# Patient Record
Sex: FEMALE | Race: WHITE | Employment: PART TIME | URBAN - METROPOLITAN AREA
[De-identification: names, ages, dates, MRNs, and addresses within clinical notes are randomized per-mention and may not be internally consistent; named-entity substitution may affect disease eponyms.]

---

## 2022-01-06 ENCOUNTER — HOSPITAL ENCOUNTER (OUTPATIENT)
Dept: GENERAL RADIOLOGY | Age: 72
Discharge: HOME | End: 2022-01-06
Payer: MEDICARE

## 2022-01-06 DIAGNOSIS — S69.91XA INJURY OF RIGHT RING FINGER, INITIAL ENCOUNTER: ICD-10-CM

## 2022-01-06 PROCEDURE — 73140 X-RAY EXAM OF FINGER(S): CPT

## 2022-02-24 ENCOUNTER — HOSPITAL ENCOUNTER (OUTPATIENT)
Dept: MAMMOGRAPHY | Age: 72
Discharge: HOME | End: 2022-02-24
Attending: FAMILY MEDICINE
Payer: MEDICARE

## 2022-02-24 DIAGNOSIS — Z78.0 POSTMENOPAUSAL STATE: ICD-10-CM

## 2022-02-24 DIAGNOSIS — Z12.31 ENCOUNTER FOR SCREENING MAMMOGRAM FOR MALIGNANT NEOPLASM OF BREAST: ICD-10-CM

## 2022-02-24 PROBLEM — M81.0 AGE-RELATED OSTEOPOROSIS WITHOUT CURRENT PATHOLOGICAL FRACTURE: Status: ACTIVE | Noted: 2022-02-24

## 2022-02-24 PROCEDURE — 77067 SCR MAMMO BI INCL CAD: CPT

## 2022-02-24 PROCEDURE — 77080 DXA BONE DENSITY AXIAL: CPT

## 2022-03-10 ENCOUNTER — HOSPITAL ENCOUNTER (EMERGENCY)
Age: 72
Discharge: HOME | End: 2022-03-10
Attending: EMERGENCY MEDICINE
Payer: MEDICARE

## 2022-03-10 ENCOUNTER — APPOINTMENT (OUTPATIENT)
Dept: CT IMAGING | Age: 72
End: 2022-03-10
Attending: EMERGENCY MEDICINE
Payer: MEDICARE

## 2022-03-10 VITALS
SYSTOLIC BLOOD PRESSURE: 166 MMHG | OXYGEN SATURATION: 99 % | HEART RATE: 63 BPM | BODY MASS INDEX: 40.18 KG/M2 | WEIGHT: 256 LBS | DIASTOLIC BLOOD PRESSURE: 73 MMHG | RESPIRATION RATE: 18 BRPM | TEMPERATURE: 97.9 F | HEIGHT: 67 IN

## 2022-03-10 DIAGNOSIS — R10.11 ABDOMINAL PAIN, RIGHT UPPER QUADRANT: Primary | ICD-10-CM

## 2022-03-10 LAB
ALBUMIN SERPL-MCNC: 3.7 G/DL (ref 3.2–4.6)
ALBUMIN/GLOB SERPL: 0.9 {RATIO} (ref 1.2–3.5)
ALP SERPL-CCNC: 98 U/L (ref 50–136)
ALT SERPL-CCNC: 30 U/L (ref 12–65)
ANION GAP SERPL CALC-SCNC: 2 MMOL/L (ref 7–16)
AST SERPL-CCNC: 26 U/L (ref 15–37)
BASOPHILS # BLD: 0 K/UL (ref 0–0.2)
BASOPHILS NFR BLD: 0 % (ref 0–2)
BILIRUB SERPL-MCNC: 0.3 MG/DL (ref 0.2–1.1)
BILIRUB UR QL: NEGATIVE
BUN SERPL-MCNC: 27 MG/DL (ref 8–23)
CALCIUM SERPL-MCNC: 9.6 MG/DL (ref 8.3–10.4)
CHLORIDE SERPL-SCNC: 108 MMOL/L (ref 98–107)
CO2 SERPL-SCNC: 31 MMOL/L (ref 21–32)
CREAT SERPL-MCNC: 0.8 MG/DL (ref 0.6–1)
DIFFERENTIAL METHOD BLD: ABNORMAL
EOSINOPHIL # BLD: 0.2 K/UL (ref 0–0.8)
EOSINOPHIL NFR BLD: 3 % (ref 0.5–7.8)
ERYTHROCYTE [DISTWIDTH] IN BLOOD BY AUTOMATED COUNT: 12.9 % (ref 11.9–14.6)
GLOBULIN SER CALC-MCNC: 3.9 G/DL (ref 2.3–3.5)
GLUCOSE SERPL-MCNC: 94 MG/DL (ref 65–100)
GLUCOSE UR QL STRIP.AUTO: NEGATIVE MG/DL
HCT VFR BLD AUTO: 39.6 % (ref 35.8–46.3)
HGB BLD-MCNC: 12.4 G/DL (ref 11.7–15.4)
IMM GRANULOCYTES # BLD AUTO: 0 K/UL (ref 0–0.5)
IMM GRANULOCYTES NFR BLD AUTO: 0 % (ref 0–5)
KETONES UR-MCNC: NEGATIVE MG/DL
LEUKOCYTE ESTERASE UR QL STRIP: NEGATIVE
LIPASE SERPL-CCNC: 143 U/L (ref 73–393)
LYMPHOCYTES # BLD: 2.2 K/UL (ref 0.5–4.6)
LYMPHOCYTES NFR BLD: 27 % (ref 13–44)
MCH RBC QN AUTO: 29.9 PG (ref 26.1–32.9)
MCHC RBC AUTO-ENTMCNC: 31.3 G/DL (ref 31.4–35)
MCV RBC AUTO: 95.4 FL (ref 79.6–97.8)
MONOCYTES # BLD: 0.6 K/UL (ref 0.1–1.3)
MONOCYTES NFR BLD: 8 % (ref 4–12)
NEUTS SEG # BLD: 5 K/UL (ref 1.7–8.2)
NEUTS SEG NFR BLD: 61 % (ref 43–78)
NITRITE UR QL: NEGATIVE
NRBC # BLD: 0 K/UL (ref 0–0.2)
PH UR: 5.5 [PH] (ref 5–9)
PLATELET # BLD AUTO: 288 K/UL (ref 150–450)
PMV BLD AUTO: 11.1 FL (ref 9.4–12.3)
POTASSIUM SERPL-SCNC: 4 MMOL/L (ref 3.5–5.1)
PROT SERPL-MCNC: 7.6 G/DL (ref 6.3–8.2)
PROT UR QL: NEGATIVE MG/DL
RBC # BLD AUTO: 4.15 M/UL (ref 4.05–5.2)
RBC # UR STRIP: NEGATIVE /UL
SERVICE CMNT-IMP: ABNORMAL
SODIUM SERPL-SCNC: 141 MMOL/L (ref 136–145)
SP GR UR: >1.03 (ref 1–1.02)
UROBILINOGEN UR QL: 0.2 EU/DL (ref 0.2–1)
WBC # BLD AUTO: 8.1 K/UL (ref 4.3–11.1)

## 2022-03-10 PROCEDURE — 99284 EMERGENCY DEPT VISIT MOD MDM: CPT

## 2022-03-10 PROCEDURE — 80053 COMPREHEN METABOLIC PANEL: CPT

## 2022-03-10 PROCEDURE — 81003 URINALYSIS AUTO W/O SCOPE: CPT

## 2022-03-10 PROCEDURE — 74176 CT ABD & PELVIS W/O CONTRAST: CPT

## 2022-03-10 PROCEDURE — 85025 COMPLETE CBC W/AUTO DIFF WBC: CPT

## 2022-03-10 PROCEDURE — 83690 ASSAY OF LIPASE: CPT

## 2022-03-10 NOTE — ED TRIAGE NOTES
Complains of right sided flank pain. States it started as a sharp intense pain on her right side. Now she states it is dull 2 out of 10 pain.  Denies hematuria/dysuria

## 2022-03-11 NOTE — ED NOTES
I have reviewed discharge instructions with the patient. The patient verbalized understanding. Patient left ED via Discharge Method: ambulatory to Home with (daughter). Opportunity for questions and clarification provided. Patient given 0 scripts.

## 2022-03-11 NOTE — ED PROVIDER NOTES
77-year-old white female developed acute onset right upper quadrant pain described as sharp and stabbing around 345 today. Pain lasted approximately 2 hours and has now resolved. She does report some residual soreness to palpation. No associated nausea, vomiting, fever, diaphoresis or urinary changes. No injury. She was driving her car when the pain started. it did not radiate into her back. The history is provided by the patient. Flank Pain   Associated symptoms include abdominal pain. Pertinent negatives include no chest pain, no fever and no headaches. Past Medical History:   Diagnosis Date    Arrhythmia 2005    SVT in past, has atrial septal defect, has hx of atrial arrythmias-last time-? 2005    Arthritis     osteo    Hypertension     stopped medicine about 6 months ago, bp under control due to weight loss    Obesity (BMI 30-39.9) 11/14/11    BMI-38.4    Thyroid disease 20 years    on medication       Past Surgical History:   Procedure Laterality Date    HX GYN  2007    GAGAN    HX HEENT  2003    blocked tear duct Lt eye then cataract to Lt eye    HX HEENT  2008, 2009    Blocked tear duct Rt eye with stint ;CE R also    HX HEENT      deviated septum    HX OTHER SURGICAL  2003    CATARACT Left eye    HX UROLOGICAL      bladder tack         Family History:   Problem Relation Age of Onset    Thyroid Disease Mother     Asthma Mother     Heart Disease Brother     Heart Attack Brother     Diabetes Brother     Heart Disease Father     Hypertension Father     Diabetes Sister         just lost one sister out of 2 to covid.       Malignant Hyperthermia Neg Hx     Pseudocholinesterase Deficiency Neg Hx     Delayed Awakening Neg Hx     Post-op Nausea/Vomiting Neg Hx     Emergence Delirium Neg Hx     Post-op Cognitive Dysfunction Neg Hx     Other Neg Hx     Alcohol abuse Neg Hx     Allergic Rhinitis Neg Hx     Allergy-severe Neg Hx     Amblyopia Neg Hx     Anemia Neg Hx     Anesth Problems Neg Hx     Angioedema Neg Hx     Anxiety Neg Hx     Arrhythmia Neg Hx     OSTEOARTHRITIS Neg Hx     Arthritis-rheumatoid Neg Hx     Ataxia Neg Hx     Atopy Neg Hx     Bipolar Disorder Neg Hx     Bleeding Prob Neg Hx     Blindness Neg Hx     Breast Cancer Neg Hx     Broken Bones Neg Hx     Cancer Neg Hx     Cataract Neg Hx     Celiac Disease Neg Hx     Childhood heart surgery Neg Hx     Childhood resp disease Neg Hx     Chorea Neg Hx     Clotting Disorder Neg Hx     Collagen Dis Neg Hx     Colon Cancer Neg Hx     Colon Polyps Neg Hx     COPD Neg Hx     Coronary Art Dis Neg Hx     Crohn's Disease Neg Hx     Cystic Fibrosis Neg Hx     Dementia Neg Hx     Depression Neg Hx     Dislocations Neg Hx     Downs Syndrome Neg Hx     Drug Abuse Neg Hx     Eclampsia Neg Hx     Eczema Neg Hx     Elevated Lipids Neg Hx     Emphysema Neg Hx     Fainting Neg Hx     Genitourinary () Neg Hx     Glaucoma Neg Hx     Headache Neg Hx     Heart defect Neg Hx     Heart Failure Neg Hx     Heart Surgery Neg Hx     Hemachromatosis Neg Hx     High Cholesterol Neg Hx     Immunodeficiency Neg Hx     Inflammatory Bowel Dz Neg Hx     Kidney Disease Neg Hx     Liver Disease Neg Hx     Lung Disease Neg Hx     Macular Degen Neg Hx     Mental Retardation Neg Hx     Migraines Neg Hx     Mult Sclerosis Neg Hx     Neuropathy Neg Hx     Neurofibromatosis Neg Hx     Osteoporosis Neg Hx     Ovarian Cancer Neg Hx     Pacemaker Neg Hx     Panic disorder Neg Hx     Parkinsonism Neg Hx     Prematurity Neg Hx      Labor Neg Hx     Psoriasis Neg Hx     Psychiatric Disorder Neg Hx     Psychotic Disorder Neg Hx     Rashes/Skin Problems Neg Hx     Retinal Detachment Neg Hx     Schizophrenia Neg Hx     Seizures Neg Hx     Severe Sprains Neg Hx     Sickle Cell Anemia Neg Hx     Sickle Cell Trait Neg Hx     SIDS Neg Hx     SLE Neg Hx     Spont Ab Neg Hx     Stomach Cancer Neg Hx     Strabismus Neg Hx     Stroke Neg Hx     Substance Abuse Neg Hx     Sudden Death Neg Hx     Suicide Neg Hx     Tuberculosis Neg Hx     Ulcerative Colitis Neg Hx     Urticaria Neg Hx     Wayne's Disease Neg Hx        Social History     Socioeconomic History    Marital status:      Spouse name: Not on file    Number of children: Not on file    Years of education: Not on file    Highest education level: Not on file   Occupational History    Not on file   Tobacco Use    Smoking status: Never Smoker    Smokeless tobacco: Never Used   Vaping Use    Vaping Use: Never used   Substance and Sexual Activity    Alcohol use: Yes     Comment: rarely    Drug use: Never     Types: Prescription    Sexual activity: Not on file   Other Topics Concern    Not on file   Social History Narrative    Not on file     Social Determinants of Health     Financial Resource Strain:     Difficulty of Paying Living Expenses: Not on file   Food Insecurity:     Worried About Running Out of Food in the Last Year: Not on file    Yesica of Food in the Last Year: Not on file   Transportation Needs:     Lack of Transportation (Medical): Not on file    Lack of Transportation (Non-Medical):  Not on file   Physical Activity:     Days of Exercise per Week: Not on file    Minutes of Exercise per Session: Not on file   Stress:     Feeling of Stress : Not on file   Social Connections:     Frequency of Communication with Friends and Family: Not on file    Frequency of Social Gatherings with Friends and Family: Not on file    Attends Latter day Services: Not on file    Active Member of Clubs or Organizations: Not on file    Attends Club or Organization Meetings: Not on file    Marital Status: Not on file   Intimate Partner Violence:     Fear of Current or Ex-Partner: Not on file    Emotionally Abused: Not on file    Physically Abused: Not on file    Sexually Abused: Not on file   Housing Stability:  Unable to Pay for Housing in the Last Year: Not on file    Number of Places Lived in the Last Year: Not on file    Unstable Housing in the Last Year: Not on file         ALLERGIES: Latex, Zovirax [acyclovir sodium], and Adhesive    Review of Systems   Constitutional: Negative for fever. HENT: Negative for congestion. Respiratory: Negative for cough and shortness of breath. Cardiovascular: Negative for chest pain. Gastrointestinal: Positive for abdominal pain. Negative for nausea and vomiting. Genitourinary: Positive for flank pain. Musculoskeletal: Negative for back pain and neck pain. Skin: Negative for rash. Neurological: Negative for headaches. All other systems reviewed and are negative. Vitals:    03/10/22 1832   BP: (!) 166/73   Pulse: 63   Resp: 18   Temp: 97.9 °F (36.6 °C)   SpO2: 99%   Weight: 116.1 kg (256 lb)   Height: 5' 7\" (1.702 m)            Physical Exam  Vitals and nursing note reviewed. Constitutional:       General: She is not in acute distress. Appearance: Normal appearance. She is not toxic-appearing. HENT:      Head: Normocephalic and atraumatic. Nose: Nose normal.      Mouth/Throat:      Mouth: Mucous membranes are moist.      Pharynx: Oropharynx is clear. Eyes:      Conjunctiva/sclera: Conjunctivae normal.      Pupils: Pupils are equal, round, and reactive to light. Cardiovascular:      Rate and Rhythm: Normal rate and regular rhythm. Pulmonary:      Effort: Pulmonary effort is normal.      Breath sounds: Normal breath sounds. Abdominal:      Palpations: Abdomen is soft. Comments: Mild tenderness palpation right upper and right lower quadrants. No rebound rigidity or guarding   Musculoskeletal:         General: Normal range of motion. Cervical back: Normal range of motion and neck supple. Skin:     General: Skin is warm and dry. Neurological:      Mental Status: She is alert and oriented to person, place, and time. Psychiatric:         Mood and Affect: Mood normal.         Behavior: Behavior normal.          MDM  Number of Diagnoses or Management Options  Diagnosis management comments: Blood work is all unremarkable and urinalysis normal.  CT abdomen pelvis shows no acute abnormality to explain her pain. Patient has remained very comfortable and has not required any intervention.   She appears safe for discharge home       Amount and/or Complexity of Data Reviewed  Clinical lab tests: ordered and reviewed  Tests in the radiology section of CPT®: ordered and reviewed  Independent visualization of images, tracings, or specimens: yes    Risk of Complications, Morbidity, and/or Mortality  Presenting problems: moderate  Diagnostic procedures: moderate  Management options: moderate           Procedures

## 2022-03-11 NOTE — ED NOTES
Pt to ED with c/o right abdominal pain. Pt states started this afternoon while picking up her granddaughter from school. Pt states doubled over in pain. Pt denies nausea or vomiting. Pt states no longer a sharp pain but a dull achy pain. Pt alert ambulatory and in NAD at this time.

## 2022-03-14 PROBLEM — F33.0 MAJOR DEPRESSIVE DISORDER, RECURRENT, MILD (HCC): Status: ACTIVE | Noted: 2022-03-14

## 2022-03-19 PROBLEM — M81.0 AGE-RELATED OSTEOPOROSIS WITHOUT CURRENT PATHOLOGICAL FRACTURE: Status: ACTIVE | Noted: 2022-02-24

## 2022-03-20 PROBLEM — F33.0 MAJOR DEPRESSIVE DISORDER, RECURRENT, MILD (HCC): Status: ACTIVE | Noted: 2022-03-14

## 2022-04-16 ENCOUNTER — HOSPITAL ENCOUNTER (OUTPATIENT)
Dept: ULTRASOUND IMAGING | Age: 72
Discharge: HOME | End: 2022-04-16
Attending: FAMILY MEDICINE
Payer: MEDICARE

## 2022-04-16 DIAGNOSIS — R10.11 RUQ ABDOMINAL PAIN: ICD-10-CM

## 2022-04-16 PROCEDURE — 76705 ECHO EXAM OF ABDOMEN: CPT

## 2022-04-18 NOTE — PROGRESS NOTES
Spoke to patient, bile duct slightly enlarged, order MRI of the abdomen with contrast to evaluate, discussed with patient

## 2022-05-04 ENCOUNTER — HOSPITAL ENCOUNTER (OUTPATIENT)
Dept: MRI IMAGING | Age: 72
Discharge: HOME | End: 2022-05-04
Attending: FAMILY MEDICINE
Payer: MEDICARE

## 2022-05-04 DIAGNOSIS — K83.9 BILE DUCT ABNORMALITY: ICD-10-CM

## 2022-05-04 PROCEDURE — 74183 MRI ABD W/O CNTR FLWD CNTR: CPT

## 2022-05-04 PROCEDURE — A9576 INJ PROHANCE MULTIPACK: HCPCS | Performed by: FAMILY MEDICINE

## 2022-05-04 PROCEDURE — 74011250636 HC RX REV CODE- 250/636: Performed by: FAMILY MEDICINE

## 2022-05-04 RX ORDER — SODIUM CHLORIDE 0.9 % (FLUSH) 0.9 %
10 SYRINGE (ML) INJECTION
Status: COMPLETED | OUTPATIENT
Start: 2022-05-04 | End: 2022-05-04

## 2022-05-04 RX ADMIN — Medication 10 ML: at 21:01

## 2022-05-04 RX ADMIN — GADOTERIDOL 23 ML: 279.3 INJECTION, SOLUTION INTRAVENOUS at 21:01

## 2022-05-27 ENCOUNTER — TELEPHONE (OUTPATIENT)
Dept: FAMILY MEDICINE CLINIC | Facility: CLINIC | Age: 72
End: 2022-05-27

## 2022-05-27 ENCOUNTER — OFFICE VISIT (OUTPATIENT)
Dept: FAMILY MEDICINE CLINIC | Facility: CLINIC | Age: 72
End: 2022-05-27
Payer: MEDICARE

## 2022-05-27 ENCOUNTER — HOSPITAL ENCOUNTER (OUTPATIENT)
Dept: GENERAL RADIOLOGY | Age: 72
Discharge: HOME | End: 2022-05-30
Payer: MEDICARE

## 2022-05-27 VITALS
WEIGHT: 249.4 LBS | HEART RATE: 61 BPM | HEIGHT: 66 IN | DIASTOLIC BLOOD PRESSURE: 98 MMHG | SYSTOLIC BLOOD PRESSURE: 160 MMHG | BODY MASS INDEX: 40.08 KG/M2 | TEMPERATURE: 97.7 F | OXYGEN SATURATION: 98 %

## 2022-05-27 DIAGNOSIS — M47.816 LUMBAR SPONDYLOSIS: ICD-10-CM

## 2022-05-27 DIAGNOSIS — M54.50 RIGHT LOW BACK PAIN, UNSPECIFIED CHRONICITY, UNSPECIFIED WHETHER SCIATICA PRESENT: Primary | ICD-10-CM

## 2022-05-27 DIAGNOSIS — M54.50 RIGHT LOW BACK PAIN, UNSPECIFIED CHRONICITY, UNSPECIFIED WHETHER SCIATICA PRESENT: ICD-10-CM

## 2022-05-27 PROBLEM — M79.605 PAIN IN BOTH LOWER EXTREMITIES: Status: ACTIVE | Noted: 2022-04-20

## 2022-05-27 PROBLEM — I89.0 LYMPHEDEMA OF BOTH LOWER EXTREMITIES: Status: ACTIVE | Noted: 2022-05-24

## 2022-05-27 PROBLEM — R60.9 LIPEDEMA: Status: ACTIVE | Noted: 2022-05-24

## 2022-05-27 PROBLEM — M48.061 SPINAL STENOSIS OF LUMBAR REGION WITHOUT NEUROGENIC CLAUDICATION: Status: ACTIVE | Noted: 2019-06-25

## 2022-05-27 PROBLEM — M79.604 PAIN IN BOTH LOWER EXTREMITIES: Status: ACTIVE | Noted: 2022-04-20

## 2022-05-27 LAB
BILIRUBIN, URINE, POC: NEGATIVE
BLOOD URINE, POC: NEGATIVE
GLUCOSE URINE, POC: NEGATIVE
KETONES, URINE, POC: NEGATIVE
LEUKOCYTE ESTERASE, URINE, POC: NORMAL
NITRITE, URINE, POC: NEGATIVE
PH, URINE, POC: 5.5 (ref 4.6–8)
PROTEIN,URINE, POC: NEGATIVE
SPECIFIC GRAVITY, URINE, POC: 1.02 (ref 1–1.03)
URINALYSIS CLARITY, POC: CLEAR
URINALYSIS COLOR, POC: YELLOW
UROBILINOGEN, POC: 0.2

## 2022-05-27 PROCEDURE — 81003 URINALYSIS AUTO W/O SCOPE: CPT | Performed by: NURSE PRACTITIONER

## 2022-05-27 PROCEDURE — 72100 X-RAY EXAM L-S SPINE 2/3 VWS: CPT

## 2022-05-27 PROCEDURE — 1123F ACP DISCUSS/DSCN MKR DOCD: CPT | Performed by: NURSE PRACTITIONER

## 2022-05-27 PROCEDURE — 1090F PRES/ABSN URINE INCON ASSESS: CPT | Performed by: NURSE PRACTITIONER

## 2022-05-27 PROCEDURE — 1036F TOBACCO NON-USER: CPT | Performed by: NURSE PRACTITIONER

## 2022-05-27 PROCEDURE — G8417 CALC BMI ABV UP PARAM F/U: HCPCS | Performed by: NURSE PRACTITIONER

## 2022-05-27 PROCEDURE — G8427 DOCREV CUR MEDS BY ELIG CLIN: HCPCS | Performed by: NURSE PRACTITIONER

## 2022-05-27 PROCEDURE — G8399 PT W/DXA RESULTS DOCUMENT: HCPCS | Performed by: NURSE PRACTITIONER

## 2022-05-27 PROCEDURE — 3017F COLORECTAL CA SCREEN DOC REV: CPT | Performed by: NURSE PRACTITIONER

## 2022-05-27 PROCEDURE — 99213 OFFICE O/P EST LOW 20 MIN: CPT | Performed by: NURSE PRACTITIONER

## 2022-05-27 RX ORDER — DIPHENHYDRAMINE HCL 25 MG
25 TABLET ORAL
COMMUNITY

## 2022-05-27 RX ORDER — M-VIT,TX,IRON,MINS/CALC/FOLIC 27MG-0.4MG
1 TABLET ORAL DAILY
COMMUNITY

## 2022-05-27 RX ORDER — BACLOFEN 10 MG/1
10 TABLET ORAL 3 TIMES DAILY
Qty: 30 TABLET | Refills: 0 | Status: SHIPPED | OUTPATIENT
Start: 2022-05-27

## 2022-05-27 ASSESSMENT — ENCOUNTER SYMPTOMS
WHEEZING: 0
SINUS PAIN: 0
SORE THROAT: 0
ABDOMINAL PAIN: 0
BACK PAIN: 0
CONSTIPATION: 0
COUGH: 0
EYE PAIN: 0
VOMITING: 0
DIARRHEA: 0
SHORTNESS OF BREATH: 0
NAUSEA: 0

## 2022-05-27 NOTE — PROGRESS NOTES
Johan Harden (:  1950) is a 73 y.o. female,Established patient, here for evaluation of the following chief complaint(s):  Back Pain (Pain/spasms across lower back X 1 week. More on right side. Stated that it is a lttle better today after taking Aleve and spending the day in bed yesterday with a heating pad.  )         ASSESSMENT/PLAN:  1. Right low back pain, unspecified chronicity, unspecified whether sciatica present  -     AMB POC URINALYSIS DIP STICK AUTO W/O MICRO  -     XR LUMBAR SPINE (2-3 VIEWS); Future  -     baclofen (LIORESAL) 10 MG tablet; Take 1 tablet by mouth 3 times daily, Disp-30 tablet, R-0Normal    Imaging due to bony point tenderness to rule out acute pathology. Antispasmodic for muscle spasms. Recommended ibuprofen/naproxen and heat. Offered PT, patient would like to consider that based on xray results. Return if symptoms worsen or fail to improve. Subjective   SUBJECTIVE/OBJECTIVE:  Back pain since 22. Denies injury, but does report she did a little extra exercise on Monday before her pain started. Pain is on the right side of her mid back. Describes pain as a constant throbbing. Is having some muscle spasms in the area. Tried heat yesterday. She did take some naproxen (3 tabs 2 x) yesterday. Also took one of her 's old pain pills last night. Has also tried arthritis creams. Review of Systems   Constitutional: Negative for appetite change, fatigue, fever and unexpected weight change. HENT: Negative for congestion, ear pain, postnasal drip, sinus pain and sore throat. Eyes: Negative for pain. Respiratory: Negative for cough, shortness of breath and wheezing. Cardiovascular: Negative for palpitations and leg swelling. Gastrointestinal: Negative for abdominal pain, constipation, diarrhea, nausea and vomiting. Genitourinary: Negative for dysuria, frequency and urgency. Musculoskeletal: Negative for back pain and joint swelling. Skin: Negative for rash and wound. Neurological: Negative for dizziness, weakness and headaches. Hematological: Does not bruise/bleed easily. Objective   Physical Exam  Vitals reviewed. Constitutional:       Appearance: Normal appearance. HENT:      Head: Normocephalic and atraumatic. Eyes:      Extraocular Movements: Extraocular movements intact. Pupils: Pupils are equal, round, and reactive to light. Cardiovascular:      Rate and Rhythm: Normal rate and regular rhythm. Heart sounds: Normal heart sounds. Pulmonary:      Effort: Pulmonary effort is normal.      Breath sounds: Normal breath sounds. Abdominal:      General: Abdomen is flat. Skin:     General: Skin is warm and dry. Neurological:      General: No focal deficit present. Mental Status: She is alert and oriented to person, place, and time. An electronic signature was used to authenticate this note.     --KELSIE Laughlin - CNP

## 2022-06-07 ENCOUNTER — HOSPITAL ENCOUNTER (OUTPATIENT)
Dept: PHYSICAL THERAPY | Age: 72
Setting detail: RECURRING SERIES
Discharge: HOME | End: 2022-06-10
Payer: MEDICARE

## 2022-06-07 PROCEDURE — 97110 THERAPEUTIC EXERCISES: CPT

## 2022-06-07 PROCEDURE — G0283 ELEC STIM OTHER THAN WOUND: HCPCS

## 2022-06-07 PROCEDURE — 97161 PT EVAL LOW COMPLEX 20 MIN: CPT

## 2022-06-07 ASSESSMENT — PAIN DESCRIPTION - LOCATION: LOCATION: BACK

## 2022-06-07 ASSESSMENT — PAIN DESCRIPTION - ORIENTATION: ORIENTATION: POSTERIOR

## 2022-06-07 ASSESSMENT — PAIN SCALES - GENERAL: PAINLEVEL_OUTOF10: 5

## 2022-06-07 NOTE — PROGRESS NOTES
Yanet Moralez  : 1950  Primary: Medicare Part A And B  Secondary: [address] @ [address] [place] [address] [address]  [address]  Phone: [phone]  Fax: [phone] Plan Frequency: up to 2 x's per week     Plan of Care/Certification Expiration Date: 22      PT Visit Info: Total # of Visits Approved: 99  Total # of Visits to Date: 1  PT Insurance Information: MED A and B / [place]   Progress Note Counter: 0      OUTPATIENT PHYSICAL THERAPY:OP NOTE TYPE: Treatment Note 2022       Episode  }Appt Desk              Treatment Diagnosis:  Other abnormalities of gait and mobility (R26.89)  Low Back Pain (M54.5)  Medical/Referring Diagnosis:  Low back pain, unspecified [M54.50]  Spondylosis without myelopathy or radiculopathy, lumbar region [Y72.343]  Referring Physician:  Vinayak White MD Orders:  PT Eval and Treat   Date of Onset:  Onset Date: 22     Allergies:   Latex and Acyclovir  Restrictions/Precautions:  Restrictions/Precautions: None  Required Braces or Orthoses?: No  No data recorded   Interventions Planned (Treatment may consist of any combination of the following):    Current Treatment Recommendations: Strengthening; ROM; Functional mobility training; Transfer training; Modalities; Therapeutic activities     Subjective Comments: The back is improving, but I want to be able to garden. Initial:}Posterior Back 5/10Post Session:  Posterior  Back 4/10  Medications Last Reviewed:  2022  Updated Objective Findings:  See evaluation note from today  Treatment   THERAPEUTIC EXERCISE: (25 minutes):    Exercises per grid below to improve mobility, strength, balance and coordination. Required minimal visual, verbal, manual and tactile cues to promote proper body alignment, promote proper body posture and promote proper body mechanics.   Progressed range, repetitions and complexity of movement as indicated. MODALITIES: (15 minutes): *  Electrical Stimulation Therapy ( hertz ) was provided with intensity adjusted throughout treatment to patient tolerance. lumbar region quadrapolar with increase to 25 MA's and MOIST HOT PACK cervical to LB /sacral region. Date:  6/7/22  Date:   Date:     Activity/Exercise Parameters Parameters Parameters   nustep with MHP  10 mins level 1     Patient education regarding knee and LB condtion  5 mins      Seated MOD neutral spine with estim. 10 mins                                    Treatment/Session Summary:    · Treatment Assessment:  improved mobility without flaring the knee  · Communication/Consultation:  regarding patient condition   · Equipment provided today:  None  · Recommendations/Intent for next treatment session: Next visit will focus on lumbar mobility and pain modulation  .     Total Treatment Billable Duration:  25 minutes   Time In: 9200  Time Out: [address]  }Post Session Pain  MedBridge Portal  MD Guidelines  Scanned Media  Benefits  MyChart    Future Appointments   Date Time Provider Seamus Christiansen   6/9/2022  4:45  [address], [facility]   6/13/2022  9:30  [address], [facility]   6/16/2022  3:15  [address], [facility]   6/28/2022  2:30 PM MD MICHAEL Lujan AMB   7/7/2022  8:45 AM MD YANNI Gonsales AMB

## 2022-06-07 NOTE — PLAN OF CARE
Hannah Ferrara  : 1950  Primary: Medicare Part A And B  Secondary: [address] @ [address] [place], [place] [address]  [address]  Phone: [phone]  Fax: [phone] Plan Frequency: up to 2 x's per week     Plan of Care/Certification Expiration Date: 22      PT Visit Info: Total # of Visits Approved: 99  Total # of Visits to Date: 1  PT Insurance Information: MED A and B / [place]   Progress Note Counter: 0      OUTPATIENT PHYSICAL THERAPY:OP NOTE TYPE: Initial Assessment 2022               Episode  Appt Desk         Treatment Diagnosis:  Other abnormalities of gait and mobility (R26.89)  Low Back Pain (M54.5)  * No diagnoses found *  Medical/Referring Diagnosis:  Low back pain, unspecified [M54.50]  Spondylosis without myelopathy or radiculopathy, lumbar region [G67.575]  Referring Physician:  Jaqueline Green*  MD Orders:  PT Eval and Treat   Return MD Appt:   Future Appointments   Date Time Provider Seamus Christiansen   2022  4:45  [address], [facility]   2022  9:30  [address], [facility]   2022  3:[address], [facility]   2022  2:30 PM Tay Farah MD PO[facility] AMB   2022  8:45 AM Ernesto Miner MD [facility] AMB     Date of Onset:  Onset Date: 22     Allergies:  Latex and Acyclovir  Restrictions/Precautions:    Restrictions/Precautions: None  Required Braces or Orthoses?: No  No data recorded   Medications Last Reviewed:  2022     SUBJECTIVE   History of Injury/Illness (Reason for Referral):  PER PT :  Lumbar Sprain and Strain. No loss of bowel or bladder. Improving centralized pain. No radiation. Unable to do yardwork. Caretaker for her . PER [facility] :  Back pain since 22. Denies injury, but does report she did a little extra exercise on Monday before her pain started.  Pain is on the right side of her mid back. Describes pain as a constant throbbing. Is having some muscle spasms in the area. Tried heat yesterday. She did take some naproxen (3 tabs 2 x) yesterday. Also took one of her 's old pain pills last night. Has also tried arthritis creams. x-ray showed multilevel lumbar arthritis   Patient Stated Goal(s):  \"Back to feel better in preparation for the knee surgery\"  Initial: Posterior Back 5/10 Post Session: Posterior Back 4/10  Past Medical History/Comorbidities:   Ms. Gwendolyn Moreno  has a past medical history of Arrhythmia, Arthritis, Hypertension, Obesity (BMI 30-39.9), and Thyroid disease. Ms. Gwendolyn Moreno  has a past surgical history that includes gyn (2007); heent; Urological Surgery; other surgical history (2003); heent (2003); and heent (2008, 2009). 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 OBJECTIVE   ROM:  TRUNK ROM limited at 50% in all planes of motion with soreness in all planes of motion along fascial tracts of the lumbar spine and LB region. Hinged segment at L5/S1 region. Left knee restriction and joint line edema left. Appearance/Posture : forward head posture and rounded shoulders with hinged lumbar spine.     Palpation:  Palpably tender in the myofascial lumbar region and fascial tracts hinged segment L5/S1 region iliolumbar region. Strength:  Hip strength limited at bilateral sides at 3+/5 hip extension, abduction, and flexion ,  CORE strength at 3+/5 or less   Neurological Screen:       Dermatomes: intact . SLR negative. Sensation: generalized soreness in the sacroiliac region and L5/S1 bilateral sides. Functional Mobility:       Gait/Ambulation: antalgic with decreased angel and mobility left LE . Transfers:  decreased transfers at slow independent mobility, slightly antalgic left with CORE weakness. Balance:  Single leg stance at  5 seconds or greater each side. Mental Status:  Intact alert, oriented, coherent and lucid   ASSESSMENT   Initial Assessment:  :  Jomar Yousif presents with debility and CORE strength weakness with lumbar sprain and strain along with left knee advanced DJD. The myofascial lumbar region is restricted with forward trunk postural degeneration is present and palpable tenderness iliolumbar region. She demonstrates decreased walking and home ADL's. Spinal stiffness, trunk and lower extremity weakness are present with soft tissue restriction. She is unable to do yardwork. Skilled PT is indicated for her functional mobility deficits. Signs and symptoms consistent with iliolumbar sprain and strain, which is improving. Problem List: (Impacting functional limitations): Body Structures, Functions, Activity Limitations Requiring Skilled Therapeutic Intervention: Decreased functional mobility ; Decreased ROM; Decreased body mechanics; Decreased tolerance to work activity;  Decreased strength; Decreased coordination     Therapy Prognosis:   Therapy Prognosis: Good     Assessment Complexity:   Low Complexity  PLAN   Effective Dates: 6/7/22 TO Plan of Care/Certification Expiration Date: 09/05/22     Frequency/Duration: Plan Frequency: up to 2 x's per week      Interventions Planned (Treatment may consist of any combination of the following):    Current Treatment Recommendations: Strengthening; ROM; Functional mobility training; Transfer training; Modalities; Therapeutic activities     Goals: (Goals have been discussed and agreed upon with patient. DISCHARGE GOALS: Time Frame: 90 days   1. Pt will be compliant and independent with HEP in order to increase lumbar mobility and LE and core strength/endurance to improve quality of life. 2. Pt will reduce score on Modified Oswestry Low Back Pain Questionnaire to 10/50 in order to demonstrate improved functional mobility and quality of life. 3. Pt will report standing for > 30 minutes with minimal to no increase in pain in order to be able to stand for prolonged periods as needed to perform standing for home activities. 4. Patient to demonstrate increased strength in bilateral LE's 1/2 muscle grade. Outcome Measure: Tool Used: Modified Oswestry Low Back Pain Questionnaire  Score:  Initial: 22/50  Most Recent: X/50 (Date: -- )   Interpretation of Score: Each section is scored on a 0-5 scale, 5 representing the greatest disability. The scores of each section are added together for a total score of 50. Medical Necessity:   Patient demonstrates good rehab potential due to higher previous functional level. Reason For Services/Other Comments:  Patient continues to require skilled intervention due to lumbar mobility restriction, pain and guarding in the lumbar and sacral region. .  Total Duration:  Time In: 1345  Time Out: 1440    Regarding Yenni Jolly's therapy, I certify that the treatment plan above will be carried out by a therapist or under their direction. Thank you for this referral,  Alice Sanz PT     Referring Physician Signature: Mike Talbert [place]* No Signature is Required for this note.         Post Session Pain  Charge Capture   POC Link  Treatment Note Link  MD Guidelines  Fe

## 2022-06-09 ENCOUNTER — CARE COORDINATION (OUTPATIENT)
Dept: CARE COORDINATION | Facility: CLINIC | Age: 72
End: 2022-06-09

## 2022-06-09 ENCOUNTER — HOSPITAL ENCOUNTER (OUTPATIENT)
Dept: PHYSICAL THERAPY | Age: 72
Setting detail: RECURRING SERIES
Discharge: HOME | End: 2022-06-12
Payer: MEDICARE

## 2022-06-09 ENCOUNTER — OFFICE VISIT (OUTPATIENT)
Dept: FAMILY MEDICINE CLINIC | Facility: CLINIC | Age: 72
End: 2022-06-09
Payer: MEDICARE

## 2022-06-09 VITALS
OXYGEN SATURATION: 98 % | SYSTOLIC BLOOD PRESSURE: 128 MMHG | BODY MASS INDEX: 39.53 KG/M2 | HEIGHT: 66 IN | TEMPERATURE: 98 F | WEIGHT: 246 LBS | RESPIRATION RATE: 12 BRPM | HEART RATE: 56 BPM | DIASTOLIC BLOOD PRESSURE: 62 MMHG

## 2022-06-09 DIAGNOSIS — M25.512 ACUTE PAIN OF LEFT SHOULDER: Primary | ICD-10-CM

## 2022-06-09 DIAGNOSIS — F41.8 SITUATIONAL ANXIETY: ICD-10-CM

## 2022-06-09 DIAGNOSIS — Z74.2 HOME HELP NEEDED: ICD-10-CM

## 2022-06-09 PROCEDURE — 97110 THERAPEUTIC EXERCISES: CPT

## 2022-06-09 PROCEDURE — G8427 DOCREV CUR MEDS BY ELIG CLIN: HCPCS | Performed by: NURSE PRACTITIONER

## 2022-06-09 PROCEDURE — G8399 PT W/DXA RESULTS DOCUMENT: HCPCS | Performed by: NURSE PRACTITIONER

## 2022-06-09 PROCEDURE — G8417 CALC BMI ABV UP PARAM F/U: HCPCS | Performed by: NURSE PRACTITIONER

## 2022-06-09 PROCEDURE — 1090F PRES/ABSN URINE INCON ASSESS: CPT | Performed by: NURSE PRACTITIONER

## 2022-06-09 PROCEDURE — 1036F TOBACCO NON-USER: CPT | Performed by: NURSE PRACTITIONER

## 2022-06-09 PROCEDURE — 99214 OFFICE O/P EST MOD 30 MIN: CPT | Performed by: NURSE PRACTITIONER

## 2022-06-09 PROCEDURE — 1123F ACP DISCUSS/DSCN MKR DOCD: CPT | Performed by: NURSE PRACTITIONER

## 2022-06-09 PROCEDURE — G0283 ELEC STIM OTHER THAN WOUND: HCPCS

## 2022-06-09 PROCEDURE — 3017F COLORECTAL CA SCREEN DOC REV: CPT | Performed by: NURSE PRACTITIONER

## 2022-06-09 RX ORDER — HYDROXYZINE HYDROCHLORIDE 25 MG/1
25 TABLET, FILM COATED ORAL 4 TIMES DAILY PRN
Qty: 120 TABLET | Refills: 0 | Status: SHIPPED | OUTPATIENT
Start: 2022-06-09 | End: 2022-07-07 | Stop reason: SDUPTHER

## 2022-06-09 ASSESSMENT — PATIENT HEALTH QUESTIONNAIRE - PHQ9
SUM OF ALL RESPONSES TO PHQ QUESTIONS 1-9: 6
9. THOUGHTS THAT YOU WOULD BE BETTER OFF DEAD, OR OF HURTING YOURSELF: 0
6. FEELING BAD ABOUT YOURSELF - OR THAT YOU ARE A FAILURE OR HAVE LET YOURSELF OR YOUR FAMILY DOWN: 0
7. TROUBLE CONCENTRATING ON THINGS, SUCH AS READING THE NEWSPAPER OR WATCHING TELEVISION: 0
8. MOVING OR SPEAKING SO SLOWLY THAT OTHER PEOPLE COULD HAVE NOTICED. OR THE OPPOSITE, BEING SO FIGETY OR RESTLESS THAT YOU HAVE BEEN MOVING AROUND A LOT MORE THAN USUAL: 0
SUM OF ALL RESPONSES TO PHQ QUESTIONS 1-9: 6
3. TROUBLE FALLING OR STAYING ASLEEP: 2
SUM OF ALL RESPONSES TO PHQ QUESTIONS 1-9: 6
2. FEELING DOWN, DEPRESSED OR HOPELESS: 2
4. FEELING TIRED OR HAVING LITTLE ENERGY: 2
SUM OF ALL RESPONSES TO PHQ QUESTIONS 1-9: 6
10. IF YOU CHECKED OFF ANY PROBLEMS, HOW DIFFICULT HAVE THESE PROBLEMS MADE IT FOR YOU TO DO YOUR WORK, TAKE CARE OF THINGS AT HOME, OR GET ALONG WITH OTHER PEOPLE: 1
5. POOR APPETITE OR OVEREATING: 0

## 2022-06-09 ASSESSMENT — ENCOUNTER SYMPTOMS
BACK PAIN: 0
DIARRHEA: 0
SHORTNESS OF BREATH: 0
COUGH: 0
WHEEZING: 0
VOMITING: 0
ABDOMINAL PAIN: 0
SINUS PAIN: 0
EYE PAIN: 0
NAUSEA: 0
CONSTIPATION: 0
SORE THROAT: 0

## 2022-06-09 ASSESSMENT — PAIN SCALES - GENERAL: PAINLEVEL_OUTOF10: 3

## 2022-06-09 NOTE — CARE COORDINATION
DAHLIA CHACON left  for pt regarding referral from PCP. Awaiting response. Will try again in 24 hours if no call back is received before then.

## 2022-06-09 NOTE — PROGRESS NOTES
[address] (:  1950) is a 73 y.o. female,Established patient, here for evaluation of the following chief complaint(s):  Arm Pain (Left arm  dull aching pain x 1 day, Pain a 4 on a scale of 1-10, radiating from shoulder down arm, Alieve, Heating Pad)         ASSESSMENT/PLAN:  1. Acute pain of left shoulder  Assessment & Plan:  New onset. Differentials: muscle strain, overuse injury  - No red flags on exam today, I do not see where any imaging is needed at this time. - Counseled patient on conservative treatments with RICE, Ibuprofen 600 - 800 mg (with food) every 6-8 hours to decrease inflammation and pain and Tylenol 1,000 mg TID to decrease pain massage and gentle stretching/exercises. Advised to also take PPI if taking NSAID daily. - The patient was given in depth verbal and written instructions regarding specific red flag symptoms for which to report to the ED in the meantime. Patient verbalizes understanding and agreement with all teaching and treatment plans.    - F/u  if no improvement or worsening symptoms prn, advised to expect gradual improvement over the next month. 2. Home help needed  Assessment & Plan:  Referral to case management for resources. Counseled patient that we cannot give medical advice regarding her 's condition, but perhaps case management could offer her some resources. Orders:  -     [facility] Coordination/Social Work - [facility] Care Management  3. Situational anxiety  Assessment & Plan:   Uncontrolled, trial of hydroxyzine for anxiety/insomnia. Advised against using her 's medications. Orders:  -     hydrOXYzine HCl (ATARAX) 25 MG tablet; Take 1 tablet by mouth 4 times daily as needed for Anxiety, Disp-120 tablet, R-0Normal      Return if symptoms worsen or fail to improve. Subjective   SUBJECTIVE/OBJECTIVE:  Left shoulder/arm pain x 1 day. Has been using Aleve and baclofen with some results. She is also has been using heating pads.

## 2022-06-09 NOTE — ASSESSMENT & PLAN NOTE
Referral to case management for resources. Counseled patient that we cannot give medical advice regarding her 's condition, but perhaps case management could offer her some resources.

## 2022-06-09 NOTE — PROGRESS NOTES
Daniel Villaseñor  : 1950  Primary: Medicare Part A And B  Secondary: [facility] [facility] @ [address] [place] [address] [address] [address]  [address]  Phone: [phone]  Fax: [phone] Plan Frequency: up to 2 x's per week     Plan of Care/Certification Expiration Date: 22      PT Visit Info: Total # of Visits Approved: 99  Total # of Visits to Date: 1  PT Insurance Information: MED A and B / [place]   Progress Note Counter: 0      OUTPATIENT PHYSICAL THERAPY:OP NOTE TYPE: Treatment Note 2022       Episode  }Appt Desk              Treatment Diagnosis:  Other abnormalities of gait and mobility (R26.89)  Low Back Pain (M54.5)  Medical/Referring Diagnosis:  Low back pain, unspecified [M54.50]  Spondylosis without myelopathy or radiculopathy, lumbar region [Y07.273]  Referring Physician:  Antonino Randall MD Orders:  PT Eval and Treat   Date of Onset:  Onset Date: 22     Allergies:   Latex and Acyclovir  Restrictions/Precautions:  Restrictions/Precautions: None  Required Braces or Orthoses?: No  No data recorded   Interventions Planned (Treatment may consist of any combination of the following):    Current Treatment Recommendations: Strengthening; ROM; Functional mobility training; Transfer training; Modalities; Therapeutic activities     Subjective Comments: The back is improving, no significant pain in the leg denoted. Initial:}    3/10Post Session:       2/10  Medications Last Reviewed:  2022  Updated Objective Findings:  See evaluation note from today  Treatment   THERAPEUTIC EXERCISE: 40 minutes):    Exercises per grid below to improve mobility, strength, balance and coordination. Required minimal visual, verbal, manual and tactile cues to promote proper body alignment, promote proper body posture and promote proper body mechanics. Progressed range, repetitions and complexity of movement as indicated.     MODALITIES: (15 minutes): *  Electrical Stimulation Therapy ( hertz ) was provided with intensity adjusted throughout treatment to patient tolerance. lumbar region quadrapolar with increase to 25 MA's and MOIST HOT PACK cervical to LB /sacral region. Skin check end of estim with MHP removed at minute 11 as she stated it was getting hot. Normal blanchable erythema was denoted. Date:  6/9/22  Date:   Date:     Activity/Exercise Parameters Parameters Parameters   nustep with MHP  10 mins level 1     Patient education regarding knee and LB condtion  5 mins      Seated MOD neutral spine with estim. 15 mins      Seated with green ball lumbar support and scapular retraction  20 x's      Supine trunk rotation  10 x's      Pelvic tilts  10 x's                  Treatment/Session Summary:    · Treatment Assessment:  less palpable pain in the lumbar spine at this time. · Communication/Consultation:  regarding patient condition   · Equipment provided today:  None  · Recommendations/Intent for next treatment session: Next visit will focus on lumbar mobility and pain modulation  .     Total Treatment Billable Duration:  25 minutes   Time In: 5445  Time Out: Jason Corrigan PT       Charge Capture  }Post Session Pain  MedBridge Portal  MD Guidelines  Scanned Media  Benefits  MyChart    Future Appointments   Date Time Provider Seamus Christiansen   6/13/2022  9:30  [address], [address] [facility]   6/16/2022  3:15  [address], PT [facility]   6/28/2022  2:30 PM Odalys Lozano MD [facility]   7/7/2022  8:45 AM Katelyn Taveras MD [facility]

## 2022-06-09 NOTE — ASSESSMENT & PLAN NOTE
New onset. Differentials: muscle strain, overuse injury  - No red flags on exam today, I do not see where any imaging is needed at this time. - Counseled patient on conservative treatments with RICE, Ibuprofen 600 - 800 mg (with food) every 6-8 hours to decrease inflammation and pain and Tylenol 1,000 mg TID to decrease pain massage and gentle stretching/exercises. Advised to also take PPI if taking NSAID daily. - The patient was given in depth verbal and written instructions regarding specific red flag symptoms for which to report to the ED in the meantime. Patient verbalizes understanding and agreement with all teaching and treatment plans.    - F/u  if no improvement or worsening symptoms prn, advised to expect gradual improvement over the next month.

## 2022-06-10 ENCOUNTER — CARE COORDINATION (OUTPATIENT)
Dept: CARE COORDINATION | Facility: CLINIC | Age: 72
End: 2022-06-10

## 2022-06-13 ENCOUNTER — CARE COORDINATION (OUTPATIENT)
Dept: CARE COORDINATION | Facility: CLINIC | Age: 72
End: 2022-06-13

## 2022-06-13 ENCOUNTER — HOSPITAL ENCOUNTER (OUTPATIENT)
Dept: PHYSICAL THERAPY | Age: 72
Setting detail: RECURRING SERIES
Discharge: HOME | End: 2022-06-16
Payer: MEDICARE

## 2022-06-13 PROCEDURE — 97016 VASOPNEUMATIC DEVICE THERAPY: CPT

## 2022-06-13 PROCEDURE — 97110 THERAPEUTIC EXERCISES: CPT

## 2022-06-13 ASSESSMENT — PAIN SCALES - GENERAL: PAINLEVEL_OUTOF10: 3

## 2022-06-13 NOTE — PROGRESS NOTES
indicated. MODALITIES: (15 minutes): *  Electrical Stimulation Therapy ( hertz ) was provided with intensity adjusted throughout treatment to patient tolerance. lumbar region quadrapolar with increase to 25 MA's and MOIST HOT PACK cervical to LB /sacral region. NONE TODAY ESTIM     VSPC left knee moderate compression and 34 degrees supine pillow under knee and quad and glute activation . Date:  6/13/22    Activity/Exercise Parameters   SCIFIT  10 mins level 1   Bridging  10 x's    Seated MOD neutral spine with estim. Seated with green ball lumbar support and scapular retraction  20 x's    Supine trunk rotation  10 x's    Pelvic tilts  10 x's    Supine with back in neutral position and left knee quad and glute activation LEFT  15 mins          Treatment/Session Summary:    · Treatment Assessment:  left knee increased swelling pre and improved post treatment . less pain in the LB region . · Communication/Consultation:  regarding patient condition   · Equipment provided today:  None  · Recommendations/Intent for next treatment session: Next visit will focus on lumbar mobility and pain modulation  .     Total Treatment Billable Duration:  45 minutes and VSPC   Time In: 0930  Time Out: [address], [address]       Charge Capture  }Post Session Pain  MedBridge Portal  MD Guidelines  Scanned Media  Benefits  MyChart    Future Appointments   Date Time Provider Seamus Christiansen   6/16/2022  3:15  [facility],[address]Nd Floor, [address] [facility] [place]   6/28/2022  2:30 PM Alejandro Flores MD [facility]   7/7/2022  8:45 AM Waqas Swift MD [facility]

## 2022-06-13 NOTE — CARE COORDINATION
Initial Contact Social Work Note - Ambulatory  6/13/2022      Date of referral: 6.9.22  Referral received from: PCP office  Reason for referral: assistance with spouse in the home    Previous SW referral: No  If yes, brief summary of outcome:     Two Identifiers Verified: Yes    Insurance Provider: Medicare    Support System: Adult Child/Children and Sibling(s)    [place] Status:     Community Providers:     ADL Assistance Needed: N/A    Housing/Living Concerns or Home Modification Needs: none     Transportation Concern: none    Medication Cost Concern: none     Medication Adherence Concern: none    Financial Concern(s): none    Income (only if applicable): not discussed in detail    Ability to Read/Write: Yes    Advance Care Plan:  Not on file; educated patient    Other:     Identified Needs:   Needs assistance in the home caring for spouse. 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 Sister in [place] and daughter are able to help at times but pt is feeling overwhelmed. Needs knee surgery and concerned about who will care for her spouse following her surgery. Pt has had caregiver waiver in the past a nd plans to apply again. Pt needy and unpleasant at times. Doesn't accept help from many people-just wants pt to assist him     Social Work Plan:   Provided number for [employer] caregiver waiver program.  Provided number for sitter services.  Next Steps: remain available over the next month for any other needs/questions    Method of Communication With Provider (if appropriate): Chart Routing      Goals Addressed                 This Visit's Progress     Supportive resources in place to maintain patient in the community (ie.  Home Health, DME equipment, refer to, medication assistant plan, etc.)        Assist pt with community resources over the next month

## 2022-06-16 ENCOUNTER — HOSPITAL ENCOUNTER (OUTPATIENT)
Dept: PHYSICAL THERAPY | Age: 72
Setting detail: RECURRING SERIES
Discharge: HOME | End: 2022-06-19
Payer: MEDICARE

## 2022-06-16 PROCEDURE — 97016 VASOPNEUMATIC DEVICE THERAPY: CPT

## 2022-06-16 PROCEDURE — 97110 THERAPEUTIC EXERCISES: CPT

## 2022-06-16 ASSESSMENT — PAIN SCALES - GENERAL: PAINLEVEL_OUTOF10: 3

## 2022-06-16 NOTE — PROGRESS NOTES
Siddhartha López  : 1950  Primary: Medicare Part A And B  Secondary: [facility],[address] @ [address] [place] [address] [place]  [address]  [address]  Phone: [phone]  Fax: [phone] Plan Frequency: up to 2 x's per week     Plan of Care/Certification Expiration Date: 22      PT Visit Info: Total # of Visits Approved: 99  Total # of Visits to Date: 1  PT Insurance Information: MED A and B / [place]   Progress Note Counter: 0      OUTPATIENT PHYSICAL THERAPY:OP NOTE TYPE: Treatment Note 2022       Episode  }Appt Desk              Treatment Diagnosis:  Other abnormalities of gait and mobility (R26.89)  Low Back Pain (M54.5)  Medical/Referring Diagnosis:  Low back pain, unspecified [M54.50]  Spondylosis without myelopathy or radiculopathy, lumbar region [K51.591]  Referring Physician:  Froylan Raygoza MD Orders:  PT Eval and Treat   Date of Onset:  Onset Date: 22     Allergies:   Latex and Acyclovir  Restrictions/Precautions:  Restrictions/Precautions: None  Required Braces or Orthoses?: No  No data recorded   Interventions Planned (Treatment may consist of any combination of the following):    Current Treatment Recommendations: Strengthening; ROM; Functional mobility training; Transfer training; Modalities; Therapeutic activities     Subjective Comments:  knee is feeling better overall   Initial:}    3 (4 knee)/10Post Session:       2 (and 2 for knee )/10  Medications Last Reviewed:  2022  Updated Objective Findings:  See evaluation note from today  Treatment   THERAPEUTIC EXERCISE: 40 minutes):    Exercises per grid below to improve mobility, strength, balance and coordination. Required minimal visual, verbal, manual and tactile cues to promote proper body alignment, promote proper body posture and promote proper body mechanics. Progressed range, repetitions and complexity of movement as indicated.     MODALITIES: (15 minutes): *  Electrical Stimulation Therapy ( hertz ) was provided with intensity adjusted throughout treatment to patient tolerance. lumbar region quadrapolar with increase to 25 MA's and MOIST HOT PACK cervical to LB /sacral region. NONE TODAY ESTIM     VSPC left knee moderate compression and 34 degrees supine pillow under knee and quad and glute activation 15 mins  . Date:  6/16/22    Activity/Exercise Parameters   SCIFIT  10 mins level 1   Bridging  10 x's    Seated MOD neutral spine with estim. Seated with green ball lumbar support and scapular retraction  20 x's    Supine trunk rotation  10 x's    Pelvic tilts  10 x's    Supine with back in neutral position and left knee quad and glute activation LEFT  15 mins          Treatment/Session Summary:    · Treatment Assessment:  left knee improved swelling pre and improved post treatment . less pain in the LB region . · Communication/Consultation:  regarding patient condition   · Equipment provided today:  None  · Recommendations/Intent for next treatment session: return to Dr. Josr Nash and will proceed from there.        Total Treatment Billable Duration:  45 minutes and VSPC   Time In: 6092  Time Out: [address], [address]       Charge Capture  }Post Session Pain  MedBridge Portal  MD Guidelines  Scanned Media  Benefits  MyChart    Future Appointments   Date Time Provider Seamus Christiansen   6/28/2022  2:30 PM Brenda Russo MD [facility]   7/7/2022  8:45 AM Chas Land MD [facility]

## 2022-06-28 ENCOUNTER — OFFICE VISIT (OUTPATIENT)
Dept: ORTHOPEDIC SURGERY | Age: 72
End: 2022-06-28
Payer: MEDICARE

## 2022-06-28 VITALS — BODY MASS INDEX: 39.7 KG/M2 | HEIGHT: 66 IN | WEIGHT: 247 LBS

## 2022-06-28 DIAGNOSIS — M17.12 PRIMARY OSTEOARTHRITIS OF LEFT KNEE: ICD-10-CM

## 2022-06-28 DIAGNOSIS — M25.562 LEFT KNEE PAIN, UNSPECIFIED CHRONICITY: Primary | ICD-10-CM

## 2022-06-28 PROCEDURE — 1036F TOBACCO NON-USER: CPT | Performed by: ORTHOPAEDIC SURGERY

## 2022-06-28 PROCEDURE — 1123F ACP DISCUSS/DSCN MKR DOCD: CPT | Performed by: ORTHOPAEDIC SURGERY

## 2022-06-28 PROCEDURE — G8417 CALC BMI ABV UP PARAM F/U: HCPCS | Performed by: ORTHOPAEDIC SURGERY

## 2022-06-28 PROCEDURE — 1090F PRES/ABSN URINE INCON ASSESS: CPT | Performed by: ORTHOPAEDIC SURGERY

## 2022-06-28 PROCEDURE — 3017F COLORECTAL CA SCREEN DOC REV: CPT | Performed by: ORTHOPAEDIC SURGERY

## 2022-06-28 PROCEDURE — G8399 PT W/DXA RESULTS DOCUMENT: HCPCS | Performed by: ORTHOPAEDIC SURGERY

## 2022-06-28 PROCEDURE — 99204 OFFICE O/P NEW MOD 45 MIN: CPT | Performed by: ORTHOPAEDIC SURGERY

## 2022-06-28 PROCEDURE — G8428 CUR MEDS NOT DOCUMENT: HCPCS | Performed by: ORTHOPAEDIC SURGERY

## 2022-06-28 NOTE — PROGRESS NOTES
Name: Sanjuana Lopez  YOB: 1950  Gender: female  MRN: [id number]    CC:   Chief Complaint   Patient presents with    New Patient    Knee Pain     lt knee         HPI: Maikol Hallman is a 73 y.o. female with a PMHx of anxiety, BMI of 40, atrial septal defect presents here for evaluation of left hip pain. She said left knee pain for a few years. Her pain is constant at this point even at rest.  She also occasionally has pain at night. She is recently undergone sclerotherapy left leg varicosities. She occasionally has sciatica type pain down the left leg. She does have a history of back pain. She denies any groin pain. She denies any instability of her knee. She is undergone viscosupplementation as well as cortisone injection therapy. She only takes over-the-counter anti-inflammatories. Allergies   Allergen Reactions    Latex Rash    Acyclovir Other (See Comments) and Rash     fever         Review of Systems:  As per HPI. Pertinent positives and negatives are addressed with the patient, particularly those related to musculoskeletal concerns. Non-orthopaedic concerns were referred back to the primary care physician. PHYSICAL EXAMINATION:   The patient appears their stated age and they are in no distress. Ht 5' 6\" (1.676 m)   Wt 247 lb (112 kg)   BMI 39.87 kg/m²       The lower extremities are as described below. There is bilateral distal edema, venous stasis changes, large varicosities noted. Some firm nodules palpable couple in the bilateral calfs. There is some erythema to the left lower leg of the lower calf from where she recently had an ablation procedure done by venous specialist.  Sensation is intact to light touch bilaterally. The gait is noted to be antalgic  Range of motion is 0 to 115, 3 to 105 degrees on the left  AP varus and valgus stressing of the bilateral knees reveal globally stable bilateral  Limb lengths are equal.  Hip flexion is excellent.   Their judgment and insight are normal.  They are oriented to situation. mood and affect appropriate. X-RAY: AP lateral skiers sunrise views of the left knee reveal tricompartmental degenerative changes with irregularity of the anterior compartment. There is collapse of the lateral compartment with subchondral sclerosis particular osteophytes. IMPRESSION: Severe osteoarthropathy of the left knee    RECOMMENDATIONS:    Reviewed x-ray findings with the patient. The patient has severe osteoarthritis of the left knee. She has failed nonoperative treatment. I have recommended left total knee arthroplasty. I discussed surgical procedure surgical risk and rehab time with the patient. She understands these and desires proceed with surgery. Surgery will be scheduled arrange. Patient provided with total knee literature, given a form for handicap parking sticker, and will be referred to physical therapy for up rehab program.  The patient will be scheduled for left total knee arthroplasty.       4--this is a chronic illness/condition with exacerbation

## 2022-06-28 NOTE — LETTER
Bridget [facility]uevas  [address] [facility] [address]        I recommended a handicap parking placard for the reason, walking 100 feet nonstop will aggravate their existing orthopedic condition, also the orthopedic condition creates a substantial limitation in routine walking. This permit is of medical necessity secondary to an impairment of mobility. Permanent tag.      MD [place] LIC # [id number]  Dr Maynor Gomez

## 2022-07-07 ENCOUNTER — OFFICE VISIT (OUTPATIENT)
Dept: FAMILY MEDICINE CLINIC | Facility: CLINIC | Age: 72
End: 2022-07-07
Payer: MEDICARE

## 2022-07-07 VITALS
BODY MASS INDEX: 40.02 KG/M2 | HEIGHT: 66 IN | WEIGHT: 249 LBS | HEART RATE: 67 BPM | OXYGEN SATURATION: 98 % | SYSTOLIC BLOOD PRESSURE: 134 MMHG | TEMPERATURE: 98.3 F | DIASTOLIC BLOOD PRESSURE: 82 MMHG

## 2022-07-07 DIAGNOSIS — R60.9 LIPEDEMA: Primary | ICD-10-CM

## 2022-07-07 DIAGNOSIS — M48.061 SPINAL STENOSIS OF LUMBAR REGION WITHOUT NEUROGENIC CLAUDICATION: ICD-10-CM

## 2022-07-07 DIAGNOSIS — F41.8 SITUATIONAL ANXIETY: ICD-10-CM

## 2022-07-07 DIAGNOSIS — R79.9 ABNORMAL FINDING OF BLOOD CHEMISTRY, UNSPECIFIED: ICD-10-CM

## 2022-07-07 DIAGNOSIS — F33.0 MAJOR DEPRESSIVE DISORDER, RECURRENT, MILD (HCC): ICD-10-CM

## 2022-07-07 DIAGNOSIS — I10 ESSENTIAL HYPERTENSION: ICD-10-CM

## 2022-07-07 LAB
BASOPHILS # BLD: 0.1 K/UL (ref 0–0.2)
BASOPHILS NFR BLD: 1 % (ref 0–2)
DIFFERENTIAL METHOD BLD: ABNORMAL
EOSINOPHIL # BLD: 0.2 K/UL (ref 0–0.8)
EOSINOPHIL NFR BLD: 4 % (ref 0.5–7.8)
ERYTHROCYTE [DISTWIDTH] IN BLOOD BY AUTOMATED COUNT: 13.5 % (ref 11.9–14.6)
EST. AVERAGE GLUCOSE BLD GHB EST-MCNC: 111 MG/DL
FERRITIN SERPL-MCNC: 69 NG/ML (ref 8–388)
HBA1C MFR BLD: 5.5 % (ref 4.8–5.6)
HCT VFR BLD AUTO: 39.7 % (ref 35.8–46.3)
HGB BLD-MCNC: 12.4 G/DL (ref 11.7–15.4)
IMM GRANULOCYTES # BLD AUTO: 0 K/UL (ref 0–0.5)
IMM GRANULOCYTES NFR BLD AUTO: 0 % (ref 0–5)
LYMPHOCYTES # BLD: 1.8 K/UL (ref 0.5–4.6)
LYMPHOCYTES NFR BLD: 32 % (ref 13–44)
MCH RBC QN AUTO: 30.4 PG (ref 26.1–32.9)
MCHC RBC AUTO-ENTMCNC: 31.2 G/DL (ref 31.4–35)
MCV RBC AUTO: 97.3 FL (ref 79.6–97.8)
MONOCYTES # BLD: 0.4 K/UL (ref 0.1–1.3)
MONOCYTES NFR BLD: 7 % (ref 4–12)
NEUTS SEG # BLD: 3.2 K/UL (ref 1.7–8.2)
NEUTS SEG NFR BLD: 56 % (ref 43–78)
NRBC # BLD: 0 K/UL (ref 0–0.2)
PLATELET # BLD AUTO: 300 K/UL (ref 150–450)
PMV BLD AUTO: 11.4 FL (ref 9.4–12.3)
RBC # BLD AUTO: 4.08 M/UL (ref 4.05–5.2)
WBC # BLD AUTO: 5.7 K/UL (ref 4.3–11.1)

## 2022-07-07 PROCEDURE — 1036F TOBACCO NON-USER: CPT | Performed by: FAMILY MEDICINE

## 2022-07-07 PROCEDURE — 1090F PRES/ABSN URINE INCON ASSESS: CPT | Performed by: FAMILY MEDICINE

## 2022-07-07 PROCEDURE — 99214 OFFICE O/P EST MOD 30 MIN: CPT | Performed by: FAMILY MEDICINE

## 2022-07-07 PROCEDURE — G8428 CUR MEDS NOT DOCUMENT: HCPCS | Performed by: FAMILY MEDICINE

## 2022-07-07 PROCEDURE — 3017F COLORECTAL CA SCREEN DOC REV: CPT | Performed by: FAMILY MEDICINE

## 2022-07-07 PROCEDURE — G8417 CALC BMI ABV UP PARAM F/U: HCPCS | Performed by: FAMILY MEDICINE

## 2022-07-07 PROCEDURE — 1123F ACP DISCUSS/DSCN MKR DOCD: CPT | Performed by: FAMILY MEDICINE

## 2022-07-07 PROCEDURE — G8399 PT W/DXA RESULTS DOCUMENT: HCPCS | Performed by: FAMILY MEDICINE

## 2022-07-07 RX ORDER — LISINOPRIL 20 MG/1
20 TABLET ORAL DAILY
Qty: 90 TABLET | Refills: 3 | Status: SHIPPED | OUTPATIENT
Start: 2022-07-07

## 2022-07-07 RX ORDER — HYDROXYZINE HYDROCHLORIDE 25 MG/1
25 TABLET, FILM COATED ORAL 4 TIMES DAILY PRN
Qty: 120 TABLET | Refills: 3 | Status: SHIPPED | OUTPATIENT
Start: 2022-07-07

## 2022-07-07 RX ORDER — OXYBUTYNIN CHLORIDE 10 MG/1
10 TABLET, EXTENDED RELEASE ORAL DAILY
Qty: 90 TABLET | Refills: 3 | Status: SHIPPED | OUTPATIENT
Start: 2022-07-07

## 2022-07-07 RX ORDER — LEVOTHYROXINE SODIUM 0.07 MG/1
75 TABLET ORAL
Qty: 90 TABLET | Refills: 3 | Status: SHIPPED | OUTPATIENT
Start: 2022-07-07

## 2022-07-07 RX ORDER — OMEPRAZOLE 20 MG/1
20 TABLET, DELAYED RELEASE ORAL DAILY
Qty: 90 TABLET | Refills: 3 | Status: SHIPPED | OUTPATIENT
Start: 2022-07-07

## 2022-07-07 ASSESSMENT — PATIENT HEALTH QUESTIONNAIRE - PHQ9
SUM OF ALL RESPONSES TO PHQ QUESTIONS 1-9: 1
SUM OF ALL RESPONSES TO PHQ9 QUESTIONS 1 & 2: 1
SUM OF ALL RESPONSES TO PHQ QUESTIONS 1-9: 1
SUM OF ALL RESPONSES TO PHQ QUESTIONS 1-9: 1
1. LITTLE INTEREST OR PLEASURE IN DOING THINGS: 0
SUM OF ALL RESPONSES TO PHQ QUESTIONS 1-9: 1
2. FEELING DOWN, DEPRESSED OR HOPELESS: 1

## 2022-07-07 ASSESSMENT — ENCOUNTER SYMPTOMS
GASTROINTESTINAL NEGATIVE: 1
EYES NEGATIVE: 1
RESPIRATORY NEGATIVE: 1
ALLERGIC/IMMUNOLOGIC NEGATIVE: 1

## 2022-07-07 NOTE — PROGRESS NOTES
Alvaro Cortez (: 1950) is a 73 y.o. female, established patient, here for evaluation of the following chief complaint(s):  Follow-up Chronic Condition, Medication Problem (zoloft made her hungary and sleepy states she might try to start this med again. ), Abdominal Pain (sometimes has pain in LLQ), and Other (feels like something is in her left ear)       ASSESSMENT/PLAN:  1. Lipedema  -     TSH; Future  -     CBC with Auto Differential; Future  -     Comprehensive Metabolic Panel; Future  -     Lipid Panel; Future  -     Hemoglobin A1C; Future  -     Ferritin; Future  -     Iron; Future  2. Situational anxiety  -     hydrOXYzine HCl (ATARAX) 25 MG tablet; Take 1 tablet by mouth 4 times daily as needed for Anxiety, Disp-120 tablet, R-3Normal  -     TSH; Future  -     CBC with Auto Differential; Future  -     Comprehensive Metabolic Panel; Future  -     Lipid Panel; Future  -     Hemoglobin A1C; Future  -     Ferritin; Future  -     Iron; Future  3. Major depressive disorder, recurrent, mild (HCC)  -     TSH; Future  -     CBC with Auto Differential; Future  -     Comprehensive Metabolic Panel; Future  -     Lipid Panel; Future  -     Hemoglobin A1C; Future  -     Ferritin; Future  -     Iron; Future  4. Spinal stenosis of lumbar region without neurogenic claudication  -     TSH; Future  -     CBC with Auto Differential; Future  -     Comprehensive Metabolic Panel; Future  -     Lipid Panel; Future  -     Hemoglobin A1C; Future  -     Ferritin; Future  -     Iron; Future  5. Essential hypertension  -     TSH; Future  -     CBC with Auto Differential; Future  -     Comprehensive Metabolic Panel; Future  -     Lipid Panel; Future  -     Hemoglobin A1C; Future  -     Ferritin; Future  -     Iron; Future  6. Abnormal finding of blood chemistry, unspecified   -     Hemoglobin A1C; Future  -     Ferritin; Future  -     Iron;  Future    Recheck labs today in setting of pale skin, fatigue, caregiver burden, patient requested ferritin and iron levels, patient states there is a hemoglobin or iron problem that runs in the family,  Refilled chronic medications, patient to follow-up in 6 months, sooner if any issues,  Restart Zoloft low dosage, if no improvement in the next 4 to 5 weeks, call back, can increase or change to different medication. Some cerumen impaction left ear, used curette to remove some wax, patient to use Debrox at home. SUBJECTIVE/OBJECTIVE:  HPI  Depression, anxiety, caregiver stress, tried Zoloft for a few weeks, then stopped it, would like to try this again, states that she does not think she gaited fair try. Appears pale today,  Still struggles from intermittent right-sided abdominal pain, common bile duct was enlarged on ultrasound, however MRI took a closer look and ruled out anything pathologic,  Distributed fat abnormally in her legs, told that she has lipedema  Review of Systems   Constitutional: Negative. HENT: Negative. Eyes: Negative. Respiratory: Negative. Cardiovascular: Negative. Gastrointestinal: Negative. Endocrine: Negative. Genitourinary: Negative. Musculoskeletal: Negative. Skin: Negative. Allergic/Immunologic: Negative. Neurological: Negative. Psychiatric/Behavioral: Negative. All other systems reviewed and are negative. Physical Exam  Vitals and nursing note reviewed. Constitutional:       General: She is not in acute distress. Appearance: Normal appearance. She is not ill-appearing. HENT:      Head: Normocephalic and atraumatic. Right Ear: External ear normal.      Left Ear: External ear normal.      Nose: Nose normal.      Mouth/Throat:      Mouth: Mucous membranes are dry. Eyes:      Extraocular Movements: Extraocular movements intact. Pupils: Pupils are equal, round, and reactive to light. Cardiovascular:      Rate and Rhythm: Normal rate. Pulses: Normal pulses.    Pulmonary:      Effort: Pulmonary effort is normal.      Breath sounds: Normal breath sounds. Abdominal:      General: There is no distension. Musculoskeletal:         General: Normal range of motion. Cervical back: Normal range of motion and neck supple. Skin:     General: Skin is warm and dry. Neurological:      General: No focal deficit present. Mental Status: She is alert and oriented to person, place, and time. Psychiatric:         Mood and Affect: Mood normal.           On this date 07/07/22  I have spent 30 minutes reviewing previous notes, test results and face to face with the patient discussing the diagnosis and importance of compliance with the treatment plan as well as documenting on the day of the visit. An electronic signature was used to authenticate this note.   -- Alcides Judd MD

## 2022-07-08 LAB
ALBUMIN SERPL-MCNC: 4 G/DL (ref 3.2–4.6)
ALBUMIN/GLOB SERPL: 1.2 {RATIO} (ref 1.2–3.5)
ALP SERPL-CCNC: 143 U/L (ref 50–136)
ALT SERPL-CCNC: 22 U/L (ref 12–65)
ANION GAP SERPL CALC-SCNC: 11 MMOL/L (ref 7–16)
AST SERPL-CCNC: 16 U/L (ref 15–37)
BILIRUB SERPL-MCNC: 0.4 MG/DL (ref 0.2–1.1)
BUN SERPL-MCNC: 20 MG/DL (ref 8–23)
CALCIUM SERPL-MCNC: 9.4 MG/DL (ref 8.3–10.4)
CHLORIDE SERPL-SCNC: 110 MMOL/L (ref 98–107)
CHOLEST SERPL-MCNC: 247 MG/DL
CO2 SERPL-SCNC: 22 MMOL/L (ref 21–32)
CREAT SERPL-MCNC: 0.8 MG/DL (ref 0.6–1)
GLOBULIN SER CALC-MCNC: 3.4 G/DL (ref 2.3–3.5)
GLUCOSE SERPL-MCNC: 85 MG/DL (ref 65–100)
HDLC SERPL-MCNC: 55 MG/DL (ref 40–60)
HDLC SERPL: 4.5 {RATIO}
IRON SERPL-MCNC: 92 UG/DL (ref 35–150)
LDLC SERPL CALC-MCNC: 164.8 MG/DL
POTASSIUM SERPL-SCNC: 4.8 MMOL/L (ref 3.5–5.1)
PROT SERPL-MCNC: 7.4 G/DL (ref 6.3–8.2)
SODIUM SERPL-SCNC: 143 MMOL/L (ref 136–145)
TRIGL SERPL-MCNC: 136 MG/DL (ref 35–150)
TSH, 3RD GENERATION: 2.5 UIU/ML (ref 0.36–3.74)
VLDLC SERPL CALC-MCNC: 27.2 MG/DL (ref 6–23)

## 2022-07-13 ENCOUNTER — CARE COORDINATION (OUTPATIENT)
Dept: CARE COORDINATION | Facility: CLINIC | Age: 72
End: 2022-07-13

## 2022-07-21 ENCOUNTER — OFFICE VISIT (OUTPATIENT)
Dept: ORTHOPEDIC SURGERY | Age: 72
End: 2022-07-21
Payer: MEDICARE

## 2022-07-21 DIAGNOSIS — M17.12 PRIMARY OSTEOARTHRITIS OF LEFT KNEE: Primary | ICD-10-CM

## 2022-07-21 DIAGNOSIS — Z78.9 DECREASED ACTIVITIES OF DAILY LIVING (ADL): ICD-10-CM

## 2022-07-21 DIAGNOSIS — Z74.09 IMPAIRED FUNCTIONAL MOBILITY, BALANCE, GAIT, AND ENDURANCE: ICD-10-CM

## 2022-07-21 DIAGNOSIS — M25.562 LEFT KNEE PAIN, UNSPECIFIED CHRONICITY: ICD-10-CM

## 2022-07-21 DIAGNOSIS — R68.89 DECREASED ACTIVITY TOLERANCE: ICD-10-CM

## 2022-07-21 DIAGNOSIS — M25.662 DECREASED RANGE OF MOTION OF LEFT KNEE: ICD-10-CM

## 2022-07-21 DIAGNOSIS — R29.898 IMPAIRED STRENGTH OF LOWER EXTREMITY: ICD-10-CM

## 2022-07-21 PROCEDURE — 97110 THERAPEUTIC EXERCISES: CPT | Performed by: PHYSICAL THERAPIST

## 2022-07-21 PROCEDURE — 97116 GAIT TRAINING THERAPY: CPT | Performed by: PHYSICAL THERAPIST

## 2022-07-21 PROCEDURE — 97162 PT EVAL MOD COMPLEX 30 MIN: CPT | Performed by: PHYSICAL THERAPIST

## 2022-07-21 NOTE — PROGRESS NOTES
[address]  [address] [facility]  Dept: [phone]      PHYSICAL THERAPY INITIAL EVALUATION     PT SESSION 1 of 8    TOTAL TIMED PROCEDURE CODES: 28 MIN. TOTAL TIME: 65 MIN. REFERRING PROVIDER: Liliya Barber, *  DIAGNOSIS:     ICD-10-CM    1. Primary osteoarthritis of left knee  M17.12       2. Left knee pain, unspecified chronicity  M25.562       3. Decreased activity tolerance  R68.89       4. Decreased activities of daily living (ADL)  Z78.9       5. Impaired functional mobility, balance, gait, and endurance  Z74.09       6. Impaired strength of lower extremity  R29.898       7. Decreased range of motion of left knee  M25.662          THERAPY PRECAUTIONS:Latex allergy and Adhesive/tape allergy  CO - MORBIDITIES AFFECTING PLAN OF CARE: Varicose veins - L leg; OA related pain of the lumbar spine. PERTINENT MEDICAL HISTORY       PAST MEDICAL AND SURGICAL HISTORY:  Past Medical History:   Diagnosis Date    Arrhythmia 2005    SVT in past, has atrial septal defect, has hx of atrial arrythmias-last time-? 2005    Arthritis     osteo    Hypertension     stopped medicine about 6 months ago, bp under control due to weight loss    Lipedema     Lipedema     right lower leg    Obesity (BMI 30-39.9) 11/14/11    BMI-38.4    Thyroid disease 20 years    on medication      Past Surgical History:   Procedure Laterality Date    GYN  2007    ALISON    HEENT      deviated septum    HEENT  2003    blocked tear duct Lt eye then cataract to Lt eye    HEENT  2008, 2009    Blocked tear duct Rt eye with stint ;CE R also    OTHER SURGICAL HISTORY  2003    CATARACT Left eye    UROLOGICAL SURGERY      bladder tack     MEDICATIONS: reviewed in chart   ALLERGIES:   Allergies   Allergen Reactions    Latex Rash    Acyclovir Other (See Comments) and Rash     fever    Seasonal         SUBJECTIVE     CHIEF COMPLAINTS: L knee pain; limited mobility.     HISTORY OF INJURY:   Remote hx of L knee arthroscopy (~2012) w residual pain which has continued to worsen. Date symptoms began: 2012  [facility]: [facility] (continuous duration > 3 months)  Primary cause of current episode: Unspecified  How did symptoms start: Insidious  Describe current symptoms: Knee swells, hurts and swells w Wbing; Kim Boot ADLs are limited. Received previous outpatient physical therapy for this problem? No    PAIN ASSESSMENT:  Pain location: L knee. Current (0-10 pain scale): 4/10  Average Pain/symptom intensity (0-10 scale)  Last 24 hours: 6/10  Last week (1-7 days): 6/10  How often do you feel symptoms? Frequently (51-75%)  Description: aching, dull, sharp, and throbbing  Aggravating factors: Wbing activities - standing, walking, lifting, carrying, stairs, helping   Alleviating factors: rest and heat     NEURO SCREEN: denies numbness, tingling, and radiating pain    SOCIAL/FUNCTIONAL HISTORY:  How would you rate your overall health? good  Pt lives  disable spouse who requires assistance  in a(n) 1 story house with ramped entrance and handicap accessible bathroom . Current DME: straight cane, rolling walker, and bedside commode  Work Status: Retired and Home-maker   Sleep: minimally disturbed  PLOF & Social Hx/Interests:  Over the course of 10 years activities have become progressively more limited by knee pain. Current level of function:  Can no longer work in yard; stages housework across the day or week due to knee complaints; continues to shop, maintain home but w pain. FUNCTIONAL OUTCOME QUESTIONNARE: Lower Extremity Functional Scale: 30/80= 38% function     DIAGNOSTIC EXAMS (per chart review): 6/28/22 - Severe osteoarthropathy of the left knee    PATIENT STATED GOALS: To walk w/o pain    OBJECTIVE EXAMINATION     Functional Outcome Questionnaire: Lower Extremity Functional Scale: 30/80= 38% function   Observation:   Posture: Knee valgus - 30° on L. Swelling/Edema:  Moderate  Skin Integrity: normal   Palpation/joint mobility: Tender along the joint lines    A/PROM Measures:    Right Left Comment   Knee Extension 0 0  Pain @ end range on L   Knee Flexion 122 93 Pain @ end range on L   Hip [facility]    Ankle [facility] WFL            Strength/MMT (0-5 Scale):   Right Left Comment   Knee Flexion 5 4 Pain w resisted testing on L.   Knee Extension 5 4- Pain w resisted testing on L. Quad set G F Pain w resisted testing on L. Hip Flexion 5 4 Pain w resisted testing on L. Hip Abduction NT 2+ Pain w resisted testing on L. Special Tests/Function:   Gait: assistive device used: std cane  antalgic    Stair management:step-to leading right ascending, step-to leading left descending  Sit to stand: W difficulty exaggerated use of UEs, weight shifted over the R LE. Treatment provided today consisted of initial evaluation followed by:  Patient Education on the condition/pathology, involved anatomy, and exercise rationale. 11274 - THERAPEUTIC EXERCISE: 20 MIN. To address pain and deficits related to ROM and mm function - force production, endurance, neuromuscular coordination for completing ADLs. Access Code: [email or web address]  URL: [email or web address]  Date: 07/22/2022  Prepared by: Raul Malik    Exercises  Supine Heel Slide - 2 x daily - 6 x weekly - 1 sets - 10 reps  Supine Hip Abduction on Slider - 2 x daily - 6 x weekly - 1 sets - 10 reps  Supine Knee Extension Strengthening - 2 x daily - 6 x weekly - 5 reps - 1 sets - 10 sec hold  Supine Quad Set - 4 x daily - 6 x weekly - 1 sets - 10 hold (sec)  Small Range Straight Leg Raise - 2 x daily - 6 x weekly - 1 sets - 10 reps    95200 - GAIT TRAINING: 15 MIN. Addressing mechanics, proper step length and weight shifting to improve household and community mobility as well as overall safety with ADLs. Instructed in/practiced transfers to/from sitting and gait pattern assisted w walker and cane.     CLINICAL DECISION MAKING/ASSESSMENT     Personal Factors/co-morbidities affecting POC (1-2 Medium/3+High): age  past/current experiences  support network  co-morbidities as previously noted   Problem List: (1-2 Low/ 3 Medium/ 4+ High) Pain  Localized swelling/edema  ROM limitations  Strength deficits  Motor control deficits  Impaired posture  Impaired transfers  Impaired gait  Impaired balance/proprioception  Decreased endurance/activity Tolerance  ADL/functional limitations/modifications  Restricted social activity  Restricted recreational participation  Decreased Knowledge of Precautions  Decreased [place] with Home Exercise Program    Clinical decision making: low complexity with therapist able to easily and confidently determine and predict expectations and future outcomes for the POC. Prognosis: good   Benefits and precautions of treatment explained to patient. Vivek Chamorro is a 73 y.o. female who presents to therapy today with evolving/changing clinical presentation (moderate complexity)  related to L knee OA. Pt would benefit from skilled physical therapy services to address the deficits noted above for return to prior level of function. PLAN OF CARE     Effective Dates: 7/21/2022 TO 8/21/2022 (30 days). Frequency/Duration: 4 - 8 sessions in the next 30 Day(s)  Interventions may include but are not limited to: Therapeutic exercise to develop ROM, strength, endurance and flexibility  Therapeutic activities using dynamic activities to improve function  Gait training to address mechanics, proper step length and weight shifting to improve household and community mobility as well as overall safety with ADLs  Home exercise program (HEP) development    The referring physician has reviewed and approved this evaluation and plan of care as noted by the electronic signature attached to note.     GOALS     Long term goals to be met by 8/19/2022 (4 weeks):   Patient will demonstrate good recall of HEP requiring minimal to no verbal cuing for proper form and technique. Pt will be able to perform HEP with min aggravation to injured knee. Increase knee AROM to 100 degrees, reducing amount of modifications needed related to knee stiffness. Pt. will report less pain upon standing after prolonged sitting by swinging leg prior to standing.      [address] [employer] [place]

## 2022-07-28 ENCOUNTER — OFFICE VISIT (OUTPATIENT)
Dept: ORTHOPEDIC SURGERY | Age: 72
End: 2022-07-28
Payer: MEDICARE

## 2022-07-28 DIAGNOSIS — R29.898 IMPAIRED STRENGTH OF LOWER EXTREMITY: ICD-10-CM

## 2022-07-28 DIAGNOSIS — R68.89 DECREASED ACTIVITY TOLERANCE: ICD-10-CM

## 2022-07-28 DIAGNOSIS — M17.12 PRIMARY OSTEOARTHRITIS OF LEFT KNEE: Primary | ICD-10-CM

## 2022-07-28 DIAGNOSIS — M25.662 DECREASED RANGE OF MOTION OF LEFT KNEE: ICD-10-CM

## 2022-07-28 DIAGNOSIS — M25.562 LEFT KNEE PAIN, UNSPECIFIED CHRONICITY: ICD-10-CM

## 2022-07-28 DIAGNOSIS — Z78.9 DECREASED ACTIVITIES OF DAILY LIVING (ADL): ICD-10-CM

## 2022-07-28 DIAGNOSIS — Z74.09 IMPAIRED FUNCTIONAL MOBILITY, BALANCE, GAIT, AND ENDURANCE: ICD-10-CM

## 2022-07-28 PROCEDURE — 97110 THERAPEUTIC EXERCISES: CPT | Performed by: PHYSICAL THERAPIST

## 2022-07-28 NOTE — PROGRESS NOTES
[address] [facility]  [address] [facility] [address]  Dept: [phone]     PHYSICAL THERAPY DAILY NOTE     POC ENDS: 8/21/22    PT SESSION: 2 of 8    TOTAL TIMED PROCEDURE CODES: 35 MIN. TOTAL TIME: 35 MIN. REFERRING PROVIDER: Bhavin Muller., *  DIAGNOSIS:    Diagnosis Orders   1. Primary osteoarthritis of left knee        2. Decreased activities of daily living (ADL)        3. Decreased range of motion of left knee        4. Left knee pain, unspecified chronicity        5. Impaired functional mobility, balance, gait, and endurance        6. Decreased activity tolerance        7. Impaired strength of lower extremity              THERAPY PRECAUTIONS:Latex allergy and Adhesive/tape allergy  CO - MORBIDITIES AFFECTING PLAN OF CARE: Varicose veins - L leg; OA related pain of the lumbar spine. PERTINENT MEDICAL HISTORY     PAST MEDICAL AND SURGICAL HISTORY:  Past Medical History:   Diagnosis Date    Arrhythmia 2005    SVT in past, has atrial septal defect, has hx of atrial arrythmias-last time-? 2005    Arthritis     osteo    Hypertension     stopped medicine about 6 months ago, bp under control due to weight loss    Lipedema     Lipedema     right lower leg    Obesity (BMI 30-39.9) 11/14/11    BMI-38.4    Thyroid disease 20 years    on medication      Past Surgical History:   Procedure Laterality Date    GYN  2007    ALISON    HEENT      deviated septum    HEENT  2003    blocked tear duct Lt eye then cataract to Lt eye    HEENT  2008, 2009    Blocked tear duct Rt eye with stint ;CE R also    OTHER SURGICAL HISTORY  2003    CATARACT Left eye    UROLOGICAL SURGERY      bladder tack     MEDICATIONS: reviewed in chart   ALLERGIES:   Allergies   Allergen Reactions    Latex Rash    Acyclovir Other (See Comments) and Rash     fever    Seasonal          HISTORY RELATED TO PRESENT COMPLAINT    CHIEF COMPLAINTS: L knee pain; limited mobility.     HISTORY OF INJURY:   Remote hx of L knee arthroscopy (~2012) w residual pain which has continued to worsen. Date symptoms began: 2012  Ninetta Beam of condition: Chronic (continuous duration > 3 months)  Primary cause of current episode: Unspecified  How did symptoms start: Insidious  Describe current symptoms: Knee swells, hurts and swells w Wbing; Don Carrasco ADLs are limited. Received previous outpatient physical therapy for this problem? No    PAIN ASSESSMENT:  Pain location: L knee. Current (0-10 pain scale): 4/10  Average Pain/symptom intensity (0-10 scale)  Last 24 hours: 6/10  Last week (1-7 days): 6/10  How often do you feel symptoms? Frequently (51-75%)  Description: aching, dull, sharp, and throbbing  Aggravating factors: Wbing activities - standing, walking, lifting, carrying, stairs, helping   Alleviating factors: rest and heat     NEURO SCREEN: denies numbness, tingling, and radiating pain    SOCIAL/FUNCTIONAL HISTORY:  How would you rate your overall health? good  Pt lives disable spouse who requires assistance in a(n) 1 story house with ramped entrance and handicap accessible bathroom. Current DME: straight cane, rolling walker, and bedside commode  Work Status: Retired and Home-maker   Sleep: minimally disturbed  PLOF & Social Hx/Interests: Over the course of 10 years activities have become progressively more limited by knee pain. Current level of function: Can no longer work in yard; stages housework across the day or week due to knee complaints; continues to shop, maintain home but w pain. FUNCTIONAL OUTCOME QUESTIONNARE: Lower Extremity Functional Scale: 30/80= 38% function     DIAGNOSTIC EXAMS (per chart review): 6/28/22 - Severe osteoarthropathy of the left knee    PATIENT STATED GOALS: To walk w/o pain    SUBJECTIVE     Pt reports: The heel slide exercise makes my knee hurt. [facility]     OBJECTIVE FINDINGS:  Functional Outcome Questionnaire: Lower Extremity Functional Scale: 30/80= 38% function   Observation:   Posture: Knee valgus - 30° on L. Swelling/Edema: Moderate  Skin Integrity: normal   Palpation/joint mobility: Painful clunk (@ ~30° flexion) w/I knee noted intermittently w knee flexion/extension. KNEE AROM (?) 7/21/22 7/28/22   PRE 0 - 93 0 - 100   POST       Special Tests/Function:   Gait: assistive device used: std cane  antalgic   Stair management:step-to leading right ascending, step-to leading left descending  Sit to stand: W difficulty exaggerated use of UEs, weight shifted over the R LE.    TREATMENT PROVIDED TODAY:  PATIENT EDUCATION: Reviewed initial POC including modification of activities and the use of heat and/or cold. Reviewed initial HEP w patient demonstrating each exercise and PT correcting technique as needed w redemonstration, verbal or tactile cueing. Reviewed w patient how to take these exercises/'movements' and incorporate them into her water exercises -  SLR, hip adduction/abduction, hip extension, squatting, walking progression - F/B, L/R.  PT answering any patient questions. 77215 - THERAPEUTIC EXERCISE: 35 MIN. To address pain and deficits related to ROM and mm function - force production, endurance, neuromuscular coordination for completing ADLs. EXERCISES:  Access Code: [email or web address]  URL: [email or web address]  Date: 07/29/2022  Prepared by: Megan Workman    Exercises  Supine Heel Slide - 2 x daily - 6 x weekly - 1 sets - 10 reps  Supine Heel Slide with Small Ball - 2 x daily - 6 x weekly - 1 sets - 10 reps  Supine Hip Abduction on Slider - 2 x daily - 6 x weekly - 1 sets - 10 reps  Supine Knee Extension Strengthening - 2 x daily - 6 x weekly - 5 reps - 1 sets - 10 sec hold  Supine Quad Set - 4 x daily - 6 x weekly - 1 sets - 10 hold (sec)  Small Range Straight Leg Raise - 2 x daily - 6 x weekly - 1 sets - 10 reps    [facility]    ASSESSMENT Fairly good skill and understanding of exercise program.  W heel slides attempted to find which method of exercise was least provocative and assisted w ball seemed to be the least painful. Patient has resumed water exercise so patient was educated in performing same rehab movements in the pool. Patient expressed understanding. PLAN     Continue 1x/wk until max skill attained. GOALS     Long term goals to be met by 8/26/2022 (4 weeks):   Patient will demonstrate good recall of HEP requiring minimal to no verbal cuing for proper form and technique. Pt will be able to perform HEP with min aggravation to injured knee. Increase knee AROM to 100 degrees, reducing amount of modifications needed related to knee stiffness. Pt. will report less pain upon standing after prolonged sitting by swinging leg prior to standing.      [address]

## 2022-08-04 ENCOUNTER — CARE COORDINATION (OUTPATIENT)
Dept: CARE COORDINATION | Facility: CLINIC | Age: 72
End: 2022-08-04

## 2022-08-04 ENCOUNTER — OFFICE VISIT (OUTPATIENT)
Dept: ORTHOPEDIC SURGERY | Age: 72
End: 2022-08-04
Payer: MEDICARE

## 2022-08-04 DIAGNOSIS — M17.12 PRIMARY OSTEOARTHRITIS OF LEFT KNEE: Primary | ICD-10-CM

## 2022-08-04 DIAGNOSIS — Z78.9 DECREASED ACTIVITIES OF DAILY LIVING (ADL): ICD-10-CM

## 2022-08-04 DIAGNOSIS — M25.562 LEFT KNEE PAIN, UNSPECIFIED CHRONICITY: ICD-10-CM

## 2022-08-04 DIAGNOSIS — R29.898 IMPAIRED STRENGTH OF LOWER EXTREMITY: ICD-10-CM

## 2022-08-04 DIAGNOSIS — R68.89 DECREASED ACTIVITY TOLERANCE: ICD-10-CM

## 2022-08-04 DIAGNOSIS — Z74.09 IMPAIRED FUNCTIONAL MOBILITY, BALANCE, GAIT, AND ENDURANCE: ICD-10-CM

## 2022-08-04 DIAGNOSIS — M25.662 DECREASED RANGE OF MOTION OF LEFT KNEE: ICD-10-CM

## 2022-08-04 PROCEDURE — 97110 THERAPEUTIC EXERCISES: CPT | Performed by: PHYSICAL THERAPIST

## 2022-08-04 NOTE — PROGRESS NOTES
[address] [address]  [address] [facility] [place] [address]  Dept: [phone]     PHYSICAL THERAPY DAILY NOTE     POC ENDS: 8/21/22    PT SESSION: 3 of 8    TOTAL TIMED PROCEDURE CODES: 39 MIN. TOTAL TIME: 55 MIN. REFERRING PROVIDER: Patricia Comer., *  DIAGNOSIS:    Diagnosis Orders   1. Primary osteoarthritis of left knee        2. Left knee pain, unspecified chronicity        3. Impaired strength of lower extremity        4. Decreased activities of daily living (ADL)        5. Impaired functional mobility, balance, gait, and endurance        6. Decreased range of motion of left knee        7. Decreased activity tolerance                THERAPY PRECAUTIONS:Latex allergy and Adhesive/tape allergy  CO - MORBIDITIES AFFECTING PLAN OF CARE: Varicose veins - L leg; OA related pain of the lumbar spine. PERTINENT MEDICAL HISTORY     PAST MEDICAL AND SURGICAL HISTORY:  Past Medical History:   Diagnosis Date    Arrhythmia 2005    SVT in past, has atrial septal defect, has hx of atrial arrythmias-last time-? 2005    Arthritis     osteo    Hypertension     stopped medicine about 6 months ago, bp under control due to weight loss    Lipedema     Lipedema     right lower leg    Obesity (BMI 30-39.9) 11/14/11    BMI-38.4    Thyroid disease 20 years    on medication      Past Surgical History:   Procedure Laterality Date    GYN  2007    ALISON    HEENT      deviated septum    HEENT  2003    blocked tear duct Lt eye then cataract to Lt eye    HEENT  2008, 2009    Blocked tear duct Rt eye with stint ;CE R also    OTHER SURGICAL HISTORY  2003    CATARACT Left eye    UROLOGICAL SURGERY      bladder tack     MEDICATIONS: reviewed in chart   ALLERGIES:   Allergies   Allergen Reactions    Latex Rash    Acyclovir Other (See Comments) and Rash     fever    Seasonal          HISTORY RELATED TO PRESENT COMPLAINT    CHIEF COMPLAINTS: L knee pain; limited mobility.     HISTORY OF INJURY:   Remote hx of L knee arthroscopy (~2012) w residual pain which has continued to worsen. Date symptoms began: 2012  Jane[place] Valladares of condition: Chronic (continuous duration > 3 months)  Primary cause of current episode: Unspecified  How did symptoms start: Insidious  Describe current symptoms: Knee swells, hurts and swells w Wbing; Trevin Molt ADLs are limited. Received previous outpatient physical therapy for this problem? No    PAIN ASSESSMENT:  Pain location: L knee. Current (0-10 pain scale): 4/10  Average Pain/symptom intensity (0-10 scale)  Last 24 hours: 6/10  Last week (1-7 days): 6/10  How often do you feel symptoms? Frequently (51-75%)  Description: aching, dull, sharp, and throbbing  Aggravating factors: Wbing activities - standing, walking, lifting, carrying, stairs, helping   Alleviating factors: rest and heat     NEURO SCREEN: denies numbness, tingling, and radiating pain    SOCIAL/FUNCTIONAL HISTORY:  How would you rate your overall health? good  Pt lives disable spouse who requires assistance in a(n) 1 story house with ramped entrance and handicap accessible bathroom. Current DME: straight cane, rolling walker, and bedside commode  Work Status: Retired and Home-maker   Sleep: minimally disturbed  PLOF & Social Hx/Interests: Over the course of 10 years activities have become progressively more limited by knee pain. Current level of function: Can no longer work in yard; stages housework across the day or week due to knee complaints; continues to shop, maintain home but w pain. FUNCTIONAL OUTCOME QUESTIONNARE: Lower Extremity Functional Scale: 30/80= 38% function     DIAGNOSTIC EXAMS (per chart review): 6/28/22 - Severe osteoarthropathy of the left knee    PATIENT STATED GOALS: To walk w/o pain    SUBJECTIVE     Pt reports:  I have done the water exercise program the past two days. Tired. Some knee soreness.     OBJECTIVE     FINDINGS:  Functional Outcome Questionnaire: Lower Extremity Functional Scale: 30/80= not tolerate sidelying hip abduction. Continue attempted graded progression of resistance exercise. PLAN     Continue 1x/wk until max skill attained. GOALS     Long term goals to be met by 8/19/2022 (4 weeks):   Patient will demonstrate good recall of HEP requiring minimal to no verbal cuing for proper form and technique. Pt will be able to perform HEP with min aggravation to injured knee. Increase knee AROM to 100 degrees, reducing amount of modifications needed related to knee stiffness. Pt. will report less pain upon standing after prolonged sitting by swinging leg prior to standing.      [address] [facility]

## 2022-08-10 NOTE — PROGRESS NOTES
[facility]  [address] [facility],[address] [address]  Dept: [phone]     PHYSICAL THERAPY DAILY NOTE     POC ENDS: 8/21/22    PT SESSION: 4 of 8    TOTAL TIMED PROCEDURE CODES: 39 MIN. TOTAL TIME: 45 MIN. REFERRING PROVIDER: Robert Fisher., *  DIAGNOSIS:    Diagnosis Orders   1. Primary osteoarthritis of left knee        2. Decreased activities of daily living (ADL)        3. Decreased activity tolerance        4. Left knee pain, unspecified chronicity        5. Impaired functional mobility, balance, gait, and endurance        6. Impaired strength of lower extremity        7. Decreased range of motion of left knee                  THERAPY PRECAUTIONS:Latex allergy and Adhesive/tape allergy  CO - MORBIDITIES AFFECTING PLAN OF CARE: Varicose veins - L leg; OA related pain of the lumbar spine. PERTINENT MEDICAL HISTORY     PAST MEDICAL AND SURGICAL HISTORY:  Past Medical History:   Diagnosis Date    Arrhythmia 2005    SVT in past, has atrial septal defect, has hx of atrial arrythmias-last time-? 2005    Arthritis     osteo    Hypertension     stopped medicine about 6 months ago, bp under control due to weight loss    Lipedema     Lipedema     right lower leg    Obesity (BMI 30-39.9) 11/14/11    BMI-38.4    Thyroid disease 20 years    on medication      Past Surgical History:   Procedure Laterality Date    GYN  2007    ALISON    HEENT      deviated septum    HEENT  2003    blocked tear duct Lt eye then cataract to Lt eye    HEENT  2008, 2009    Blocked tear duct Rt eye with stint ;CE R also    OTHER SURGICAL HISTORY  2003    CATARACT Left eye    UROLOGICAL SURGERY      bladder tack     MEDICATIONS: reviewed in chart   ALLERGIES:   Allergies   Allergen Reactions    Latex Rash    Acyclovir Other (See Comments) and Rash     fever    Seasonal          HISTORY RELATED TO PRESENT COMPLAINT    CHIEF COMPLAINTS: L knee pain; limited mobility.     HISTORY OF INJURY:   Remote hx of L knee arthroscopy (~2012) w residual pain which has continued to worsen. Date symptoms began: 2012  Janet  of condition: Chronic (continuous duration > 3 months)  Primary cause of current episode: Unspecified  How did symptoms start: Insidious  Describe current symptoms: Knee swells, hurts and swells w Wbing; Fiorella Galas ADLs are limited. Received previous outpatient physical therapy for this problem? No    PAIN ASSESSMENT:  Pain location: L knee. Current (0-10 pain scale): 4/10  Average Pain/symptom intensity (0-10 scale)  Last 24 hours: 6/10  Last week (1-7 days): 6/10  How often do you feel symptoms? Frequently (51-75%)  Description: aching, dull, sharp, and throbbing  Aggravating factors: Wbing activities - standing, walking, lifting, carrying, stairs, helping   Alleviating factors: rest and heat     NEURO SCREEN: denies numbness, tingling, and radiating pain    SOCIAL/FUNCTIONAL HISTORY:  How would you rate your overall health? good  Pt lives disable spouse who requires assistance in a(n) 1 story house with ramped entrance and handicap accessible bathroom. Current DME: straight cane, rolling walker, and bedside commode  Work Status: Retired and Home-maker   Sleep: minimally disturbed  PLOF & Social Hx/Interests: Over the course of 10 years activities have become progressively more limited by knee pain. Current level of function: Can no longer work in yard; stages housework across the day or week due to knee complaints; continues to shop, maintain home but w pain. FUNCTIONAL OUTCOME QUESTIONNARE: Lower Extremity Functional Scale: 30/80= 38% function     DIAGNOSTIC EXAMS (per chart review): 6/28/22 - Severe osteoarthropathy of the left knee    PATIENT STATED GOALS: To walk w/o pain. My ultimate goal is to walk on the beach again. SUBJECTIVE     Pt reports:  Continueing water exercise. I do feel like I have gained some flexibility. My ultimate goal is to walk on the beach again.     OBJECTIVE FINDINGS:  Functional Outcome Questionnaire: Lower Extremity Functional Scale: 30/80= 38% function   Observation:   Posture: Knee valgus - 30° on L. Swelling/Edema: Moderate  Skin Integrity: normal   Palpation/joint mobility: Painful clunk (@ ~30° flexion) w/I knee noted intermittently w knee flexion/extension. LEFT KNEE AROM (?) 7/21/22 7/28/22 8/11/22   PRE 0 - 93 0 - 100 0 - 104   POST        Special Tests/Function:   Gait: assistive device used: std cane  antalgic   Stair management:step-to leading right ascending, step-to leading left descending  Sit to stand: W difficulty exaggerated use of UEs, weight shifted over the R LE.    TREATMENT PROVIDED TODAY:  PATIENT EDUCATION: Reviewed/reinforced symptom limited exercise and modification of activities and the use of heat and/or cold. PT answering any patient questions. [address] - THERAPEUTIC EXERCISE: 45 MIN. To address pain and deficits related to ROM and mm function - force production, endurance, neuromuscular coordination for completing ADLs. EXERCISES:  L/R LAQs: 1 set(s): Gr R: Loop: 10. Tolerated poorly on the L. Avoid full extension. L/R HS curls: 1 set(s): Gr R: Loop: 10.    L Ball assisted heel slide: 2 set(s): 1 min. L TKEs: 2 set(s): 1#/90 sec. L Quad sets (QS): 1 set(s): 90 sec. L QS+SLR: 1 set(s): 10.  L Hip abduction: 1 set(s): 10. Slider assisted. Access Code: [email or web address]  URL: [email or web address]  Date: 07/29/2022  Prepared by: Giovany Lalfeur    Exercises  Supine Heel Slide - 2 x daily - 6 x weekly - 1 sets - 10 reps  Supine Heel Slide with Small Ball - 2 x daily - 6 x weekly - 1 sets - 10 reps  Supine Hip Abduction on Slider - 2 x daily - 6 x weekly - 1 sets - 10 reps  Supine Knee Extension Strengthening - 2 x daily - 6 x weekly - 5 reps - 1 sets - 10 sec hold  Supine Quad Set - 4 x daily - 6 x weekly - 1 sets - 10 hold (sec)  Small Range Straight Leg Raise - 2 x daily - 6 x weekly - 1 sets - 10 reps    MedBridge    ASSESSMENT     Patient notes improved flexibility which is confirmed w updated goniometric measurement. Improving ability to perform exercises w/o provocation of knee pain. Patient also participating in water exercise 2 - 3x/wk. PLAN     One additional session scheduled. At next session reassess:  Function by patient report and w LEFS. Pain intensity  Skill w HEP    Determine DC v extension of treatment plan. GOALS     Long term goals to be met by 8/19/2022 (4 weeks):   Patient will demonstrate good recall of HEP requiring minimal to no verbal cuing for proper form and technique. Pt will be able to perform HEP with min aggravation to injured knee. Increase knee AROM to 100 degrees, reducing amount of modifications needed related to knee stiffness. Pt. will report less pain upon standing after prolonged sitting by swinging leg prior to standing.      [address]

## 2022-08-11 ENCOUNTER — OFFICE VISIT (OUTPATIENT)
Dept: ORTHOPEDIC SURGERY | Age: 72
End: 2022-08-11
Payer: MEDICARE

## 2022-08-11 DIAGNOSIS — M25.662 DECREASED RANGE OF MOTION OF LEFT KNEE: ICD-10-CM

## 2022-08-11 DIAGNOSIS — Z78.9 DECREASED ACTIVITIES OF DAILY LIVING (ADL): ICD-10-CM

## 2022-08-11 DIAGNOSIS — Z74.09 IMPAIRED FUNCTIONAL MOBILITY, BALANCE, GAIT, AND ENDURANCE: ICD-10-CM

## 2022-08-11 DIAGNOSIS — R68.89 DECREASED ACTIVITY TOLERANCE: ICD-10-CM

## 2022-08-11 DIAGNOSIS — M17.12 PRIMARY OSTEOARTHRITIS OF LEFT KNEE: Primary | ICD-10-CM

## 2022-08-11 DIAGNOSIS — R29.898 IMPAIRED STRENGTH OF LOWER EXTREMITY: ICD-10-CM

## 2022-08-11 DIAGNOSIS — M25.562 LEFT KNEE PAIN, UNSPECIFIED CHRONICITY: ICD-10-CM

## 2022-08-11 PROCEDURE — 97110 THERAPEUTIC EXERCISES: CPT | Performed by: PHYSICAL THERAPIST

## 2022-08-17 ENCOUNTER — CARE COORDINATION (OUTPATIENT)
Dept: CARE COORDINATION | Facility: CLINIC | Age: 72
End: 2022-08-17

## 2022-08-18 ENCOUNTER — OFFICE VISIT (OUTPATIENT)
Dept: ORTHOPEDIC SURGERY | Age: 72
End: 2022-08-18
Payer: MEDICARE

## 2022-08-18 DIAGNOSIS — M25.662 DECREASED RANGE OF MOTION OF LEFT KNEE: ICD-10-CM

## 2022-08-18 DIAGNOSIS — R29.898 IMPAIRED STRENGTH OF LOWER EXTREMITY: ICD-10-CM

## 2022-08-18 DIAGNOSIS — Z74.09 IMPAIRED FUNCTIONAL MOBILITY, BALANCE, GAIT, AND ENDURANCE: ICD-10-CM

## 2022-08-18 DIAGNOSIS — M17.12 PRIMARY OSTEOARTHRITIS OF LEFT KNEE: Primary | ICD-10-CM

## 2022-08-18 DIAGNOSIS — R68.89 DECREASED ACTIVITY TOLERANCE: ICD-10-CM

## 2022-08-18 DIAGNOSIS — M25.562 LEFT KNEE PAIN, UNSPECIFIED CHRONICITY: ICD-10-CM

## 2022-08-18 DIAGNOSIS — Z78.9 DECREASED ACTIVITIES OF DAILY LIVING (ADL): ICD-10-CM

## 2022-08-18 PROCEDURE — 97110 THERAPEUTIC EXERCISES: CPT | Performed by: PHYSICAL THERAPIST

## 2022-08-18 NOTE — PROGRESS NOTES
[facility]  [address] [facility],[address] [address]  Dept: [phone]     PHYSICAL THERAPY DAILY NOTE     POC ENDS: 8/21/22    PT SESSION: 5 of 8    TOTAL TIMED PROCEDURE CODES: 40 MIN. TOTAL TIME: 40 MIN. REFERRING PROVIDER: Robert Fisher., *  DIAGNOSIS:    Diagnosis Orders   1. Primary osteoarthritis of left knee        2. Left knee pain, unspecified chronicity        3. Decreased range of motion of left knee        4. Decreased activities of daily living (ADL)        5. Impaired functional mobility, balance, gait, and endurance        6. Decreased activity tolerance        7. Impaired strength of lower extremity                  THERAPY PRECAUTIONS:Latex allergy and Adhesive/tape allergy  CO - MORBIDITIES AFFECTING PLAN OF CARE: Varicose veins - L leg; OA related pain of the lumbar spine. PERTINENT MEDICAL HISTORY     PAST MEDICAL AND SURGICAL HISTORY:  Past Medical History:   Diagnosis Date    Arrhythmia 2005    SVT in past, has atrial septal defect, has hx of atrial arrythmias-last time-? 2005    Arthritis     osteo    Hypertension     stopped medicine about 6 months ago, bp under control due to weight loss    Lipedema     Lipedema     right lower leg    Obesity (BMI 30-39.9) 11/14/11    BMI-38.4    Thyroid disease 20 years    on medication      Past Surgical History:   Procedure Laterality Date    GYN  2007    ALISON    HEENT      deviated septum    HEENT  2003    blocked tear duct Lt eye then cataract to Lt eye    HEENT  2008, 2009    Blocked tear duct Rt eye with stint ;CE R also    OTHER SURGICAL HISTORY  2003    CATARACT Left eye    UROLOGICAL SURGERY      bladder tack     MEDICATIONS: reviewed in chart   ALLERGIES:   Allergies   Allergen Reactions    Latex Rash    Acyclovir Other (See Comments) and Rash     fever    Seasonal          HISTORY RELATED TO PRESENT COMPLAINT    CHIEF COMPLAINTS: L knee pain; limited mobility.     HISTORY OF INJURY:   Remote hx of L knee arthroscopy (~2012) w residual pain which has continued to worsen. Date symptoms began: 2012  Ninetta Beam of condition: Chronic (continuous duration > 3 months)  Primary cause of current episode: Unspecified  How did symptoms start: Insidious  Describe current symptoms: Knee swells, hurts and swells w Wbing; Don Carrasco ADLs are limited. Received previous outpatient physical therapy for this problem? No    PAIN ASSESSMENT:  Pain location: L knee. Current (0-10 pain scale): 4/10  Average Pain/symptom intensity (0-10 scale)  Last 24 hours: 6/10  Last week (1-7 days): 6/10  How often do you feel symptoms? Frequently (51-75%)  Description: aching, dull, sharp, and throbbing  Aggravating factors: Wbing activities - standing, walking, lifting, carrying, stairs, helping   Alleviating factors: rest and heat     NEURO SCREEN: denies numbness, tingling, and radiating pain    SOCIAL/FUNCTIONAL HISTORY:  How would you rate your overall health? good  Pt lives disable spouse who requires assistance in a(n) 1 story house with ramped entrance and handicap accessible bathroom. Current DME: straight cane, rolling walker, and bedside commode  Work Status: Retired and Home-maker   Sleep: minimally disturbed  PLOF & Social Hx/Interests: Over the course of 10 years activities have become progressively more limited by knee pain. Current level of function: Can no longer work in yard; stages housework across the day or week due to knee complaints; continues to shop, maintain home but w pain. FUNCTIONAL OUTCOME QUESTIONNARE: Lower Extremity Functional Scale: 30/80= 38% function     DIAGNOSTIC EXAMS (per chart review): 6/28/22 - Severe osteoarthropathy of the left knee    PATIENT STATED GOALS: To walk w/o pain. My ultimate goal is to walk on the beach again. SUBJECTIVE     Pt reports:  Today has not done that well today. Have had episodic sharp pain that made the knee feel like it would give way. Ready for DC.   Will continue exercises and going to the water classes at least 2 days a week. My ultimate goal is to walk on the beach again. OBJECTIVE     FINDINGS:  Functional Outcome Questionnaire: Lower Extremity Functional Scale: At IE (7/21/22): 30/80= 38% function. 8/18/22: 34/80 = 43 function  Observation:   Posture: Knee valgus - 30° on L. Swelling/Edema: Moderate  Skin Integrity: normal   Palpation/joint mobility: Painful clunk (@ ~30° flexion) w/I knee noted intermittently w knee flexion/extension. LEFT KNEE AROM (?) 7/21/22 7/28/22 8/11/22   PRE 0 - 93 0 - 100 0 - 104   POST        Special Tests/Function:   Gait: assistive device used: std cane  antalgic   Stair management:step-to leading right ascending, step-to leading left descending  Sit to stand: W difficulty exaggerated use of UEs, weight shifted over the R LE.    TREATMENT PROVIDED TODAY:  PATIENT EDUCATION: Reviewed/reinforced symptom limited exercise and modification of activities and the use of heat and/or cold. PT answering any patient questions. [id number] - THERAPEUTIC EXERCISE: 40 MIN. To address pain and deficits related to ROM and mm function - force production, endurance, neuromuscular coordination for completing ADLs. EXERCISES:  L/R LAQs: 1 set(s): Lt Blue R: Loop: 10. Avoid full extension. L/R HS curls: 1 set(s): Lt Blue R: Loop: 10.    L Ball assisted heel slide: 2 set(s): 1 min. L TKEs: 2 set(s): 1#/90 sec. L Quad sets (QS): 1 set(s): 90 sec. L QS+SLR: 1 set(s): 10.  L Hip abduction: 1 set(s): 10. Slider assisted. Access Code: [email or web address]  URL: [email or web address]  Date: 07/29/2022  Prepared by: Nikita Easton    Exercises  Supine Heel Slide - 2 x daily - 6 x weekly - 1 sets - 10 reps  Supine Heel Slide with Small Ball - 2 x daily - 6 x weekly - 1 sets - 10 reps  Supine Hip Abduction on Slider - 2 x daily - 6 x weekly - 1 sets - 10 reps  Supine Knee Extension Strengthening - 2 x daily - 6 x weekly - 5 reps - 1 sets - 10 sec hold  Supine Quad Set - 4 x daily - 6 x weekly - 1 sets - 10 hold (sec)  Small Range Straight Leg Raise - 2 x daily - 6 x weekly - 1 sets - 10 reps    MedBridge    ASSESSMENT     POC completed. Patient demonstrates independent skill w exercise program.  She is also participating in water exercise classes which she will continue. PT goals attained:  Patient will demonstrate good recall of HEP requiring minimal to no verbal cuing for proper form and technique. Pt will be able to perform HEP with min aggravation to injured knee. Increase knee AROM to 100 degrees, reducing amount of modifications needed related to knee stiffness. Pt. will report less pain upon standing after prolonged sitting by swinging leg prior to standing. PLAN     DC to unsupervised HEP.     [address]

## 2022-09-02 ENCOUNTER — CARE COORDINATION (OUTPATIENT)
Dept: CARE COORDINATION | Facility: CLINIC | Age: 72
End: 2022-09-02

## 2022-09-02 NOTE — CARE COORDINATION
Final follow up call made. Left VM requesting call back. If no call back is received within 5 business days, case will be closed.

## 2022-09-09 ENCOUNTER — CARE COORDINATION (OUTPATIENT)
Dept: CARE COORDINATION | Facility: CLINIC | Age: 72
End: 2022-09-09

## 2022-09-09 NOTE — CARE COORDINATION
Received return call from pt. She has knee surgery coming up and has still not heard back from [employer],  She plans to reach back out to them.   DAHLIA CHACON will remain on care team.

## 2022-09-16 DIAGNOSIS — M17.12 PRIMARY OSTEOARTHRITIS OF LEFT KNEE: Primary | ICD-10-CM

## 2022-09-27 ENCOUNTER — PATIENT MESSAGE (OUTPATIENT)
Dept: FAMILY MEDICINE CLINIC | Facility: CLINIC | Age: 72
End: 2022-09-27

## 2022-09-27 NOTE — TELEPHONE ENCOUNTER
From: Raffi Landeros  To: Dr. Tosin Harmon  Sent: 9/27/2022 8:57 AM EDT  Subject: Counseling needed    I need a counselor to help me deal with caregiver stress and it needs to be someone that takes Medicare. Do you have any suggestions?

## 2022-10-18 ENCOUNTER — CARE COORDINATION (OUTPATIENT)
Dept: CARE COORDINATION | Facility: CLINIC | Age: 72
End: 2022-10-18

## 2022-11-17 DIAGNOSIS — M17.12 PRIMARY OSTEOARTHRITIS OF LEFT KNEE: Primary | ICD-10-CM

## 2022-11-28 RX ORDER — SODIUM CHLORIDE 0.9 % (FLUSH) 0.9 %
5-40 SYRINGE (ML) INJECTION PRN
OUTPATIENT
Start: 2022-11-28

## 2022-11-28 RX ORDER — SODIUM CHLORIDE 0.9 % (FLUSH) 0.9 %
5-40 SYRINGE (ML) INJECTION EVERY 12 HOURS SCHEDULED
OUTPATIENT
Start: 2022-11-28

## 2022-11-28 RX ORDER — SODIUM CHLORIDE 9 MG/ML
INJECTION, SOLUTION INTRAVENOUS PRN
OUTPATIENT
Start: 2022-11-28

## 2022-11-28 RX ORDER — ACETAMINOPHEN 500 MG
1000 TABLET ORAL ONCE
OUTPATIENT
Start: 2022-11-28 | End: 2022-11-28

## 2022-11-29 ENCOUNTER — HOSPITAL ENCOUNTER (OUTPATIENT)
Dept: REHABILITATION | Age: 72
Discharge: HOME | End: 2022-12-02
Payer: MEDICARE

## 2022-11-29 ENCOUNTER — HOSPITAL ENCOUNTER (OUTPATIENT)
Dept: SURGERY | Age: 72
Discharge: HOME | End: 2022-12-02
Payer: MEDICARE

## 2022-11-29 VITALS
HEART RATE: 63 BPM | WEIGHT: 251.25 LBS | OXYGEN SATURATION: 100 % | SYSTOLIC BLOOD PRESSURE: 178 MMHG | DIASTOLIC BLOOD PRESSURE: 80 MMHG | BODY MASS INDEX: 39.43 KG/M2 | TEMPERATURE: 97.7 F | HEIGHT: 67 IN

## 2022-11-29 DIAGNOSIS — M17.12 PRIMARY OSTEOARTHRITIS OF LEFT KNEE: Primary | ICD-10-CM

## 2022-11-29 LAB
ANION GAP SERPL CALC-SCNC: 4 MMOL/L (ref 2–11)
APTT PPP: 36.2 SEC (ref 24.5–34.2)
BACTERIA SPEC CULT: ABNORMAL
BASOPHILS # BLD: 0.1 K/UL (ref 0–0.2)
BASOPHILS NFR BLD: 1 % (ref 0–2)
BUN SERPL-MCNC: 32 MG/DL (ref 8–23)
CALCIUM SERPL-MCNC: 9.4 MG/DL (ref 8.3–10.4)
CHLORIDE SERPL-SCNC: 107 MMOL/L (ref 101–110)
CO2 SERPL-SCNC: 29 MMOL/L (ref 21–32)
CREAT SERPL-MCNC: 0.71 MG/DL (ref 0.6–1)
DIFFERENTIAL METHOD BLD: NORMAL
EKG ATRIAL RATE: 58 BPM
EKG DIAGNOSIS: NORMAL
EKG P AXIS: 47 DEGREES
EKG P-R INTERVAL: 136 MS
EKG Q-T INTERVAL: 424 MS
EKG QRS DURATION: 80 MS
EKG QTC CALCULATION (BAZETT): 416 MS
EKG R AXIS: 64 DEGREES
EKG T AXIS: 35 DEGREES
EKG VENTRICULAR RATE: 58 BPM
EOSINOPHIL # BLD: 0.2 K/UL (ref 0–0.8)
EOSINOPHIL NFR BLD: 3 % (ref 0.5–7.8)
ERYTHROCYTE [DISTWIDTH] IN BLOOD BY AUTOMATED COUNT: 13.2 % (ref 11.9–14.6)
EST. AVERAGE GLUCOSE BLD GHB EST-MCNC: 114 MG/DL
GLUCOSE SERPL-MCNC: 83 MG/DL (ref 65–100)
HBA1C MFR BLD: 5.6 % (ref 4.8–5.6)
HCT VFR BLD AUTO: 38.7 % (ref 35.8–46.3)
HGB BLD-MCNC: 12.3 G/DL (ref 11.7–15.4)
IMM GRANULOCYTES # BLD AUTO: 0 K/UL (ref 0–0.5)
IMM GRANULOCYTES NFR BLD AUTO: 0 % (ref 0–5)
INR PPP: 1
LYMPHOCYTES # BLD: 1.6 K/UL (ref 0.5–4.6)
LYMPHOCYTES NFR BLD: 26 % (ref 13–44)
MCH RBC QN AUTO: 30.1 PG (ref 26.1–32.9)
MCHC RBC AUTO-ENTMCNC: 31.8 G/DL (ref 31.4–35)
MCV RBC AUTO: 94.9 FL (ref 82–102)
MONOCYTES # BLD: 0.4 K/UL (ref 0.1–1.3)
MONOCYTES NFR BLD: 7 % (ref 4–12)
NEUTS SEG # BLD: 3.9 K/UL (ref 1.7–8.2)
NEUTS SEG NFR BLD: 64 % (ref 43–78)
NRBC # BLD: 0 K/UL (ref 0–0.2)
PLATELET # BLD AUTO: 305 K/UL (ref 150–450)
PMV BLD AUTO: 10.9 FL (ref 9.4–12.3)
POTASSIUM SERPL-SCNC: 3.6 MMOL/L (ref 3.5–5.1)
PROTHROMBIN TIME: 13.6 SEC (ref 12.6–14.3)
RBC # BLD AUTO: 4.08 M/UL (ref 4.05–5.2)
SERVICE CMNT-IMP: ABNORMAL
SODIUM SERPL-SCNC: 140 MMOL/L (ref 133–143)
WBC # BLD AUTO: 6.1 K/UL (ref 4.3–11.1)

## 2022-11-29 PROCEDURE — 85730 THROMBOPLASTIN TIME PARTIAL: CPT

## 2022-11-29 PROCEDURE — 85025 COMPLETE CBC W/AUTO DIFF WBC: CPT

## 2022-11-29 PROCEDURE — 83036 HEMOGLOBIN GLYCOSYLATED A1C: CPT

## 2022-11-29 PROCEDURE — 93005 ELECTROCARDIOGRAM TRACING: CPT | Performed by: ANESTHESIOLOGY

## 2022-11-29 PROCEDURE — 87641 MR-STAPH DNA AMP PROBE: CPT

## 2022-11-29 PROCEDURE — 94760 N-INVAS EAR/PLS OXIMETRY 1: CPT

## 2022-11-29 PROCEDURE — 97161 PT EVAL LOW COMPLEX 20 MIN: CPT

## 2022-11-29 PROCEDURE — 80048 BASIC METABOLIC PNL TOTAL CA: CPT

## 2022-11-29 PROCEDURE — 85610 PROTHROMBIN TIME: CPT

## 2022-11-29 RX ORDER — CETIRIZINE HYDROCHLORIDE 10 MG/1
10 TABLET ORAL
COMMUNITY

## 2022-11-29 ASSESSMENT — KOOS JR
GOING UP OR DOWN STAIRS: 2
TWISING OR PIVOTING ON KNEE: 2
BENDING TO THE FLOOR TO PICK UP OBJECT: 2
HOW SEVERE IS YOUR KNEE STIFFNESS AFTER FIRST WAKING IN MORNING: 2
STANDING UPRIGHT: 2
STRAIGHTENING KNEE FULLY: 2
KOOS JR TOTAL INTERVAL SCORE: 50.012
RISING FROM SITTING: 3

## 2022-11-29 ASSESSMENT — PULMONARY FUNCTION TESTS
FEV1 (LITERS): 2.5
FEV1 (%PREDICTED): 100

## 2022-11-29 ASSESSMENT — PAIN SCALES - GENERAL: PAINLEVEL_OUTOF10: 5

## 2022-11-29 NOTE — PROGRESS NOTES
Mendoza Eddy  : 1950  Primary: Medicare Part A And B  Secondary: [address]  at [facility]  [address] [place] [address]  [phone]        Physical Therapy Prehab Evaluation Summary:2022   Time In/Out   PT Charge Capture  Episode     MEDICAL/REFERRING DIAGNOSIS: Unilateral primary osteoarthritis, left knee [M17.12]  REFERRING PHYSICIAN: Jazzy Hdez., *    ICD-10: Treatment Diagnosis:   Pain in Left Knee (M25.562)  Stiffness of Left Knee, Not elsewhere classified (M25.662)  Difficulty in walking, Not elsewhere classified (R26.2)    DATE OF SURGERY: 22  Assessment:   COMMENTS:  Patient presents prior to L TKA. She lives with her spouse but he is disabled and uses a walker. She will have help from their daughter for the first week. Patient plans to stay one night. PROBLEM LIST:   (Impacting functional limitations):  Ms. Song presents with the following lower extremity(s) problems:  Transfers  Gait  Strength  Range of Motion  Balance  Home Exercise Program  Pain INTERVENTIONS PLANNED:   (Benefits and precautions of physical therapy have been discussed with the patient.)  Home Exercise Program  Educational Discussion       GOALS: (Goals have been discussed and agreed upon with patient.)  Discharge Goals: Time Frame: 1 Day  Patient will demonstrate independence with a home exercise program designed to increase strength, range of motion, balance, coordination, functional technique, and pain control to minimize functional deficits and optimize patient for total joint replacement. Subjective:   Past Medical History/Comorbidities:   Ms. Song  has a past medical history of Arrhythmia, Arthritis, Hypertension, Lipedema, Lipedema, Obesity (BMI 30-39.9), and Thyroid disease. Ms. Song  has a past surgical history that includes gyn (); heent; Urological Surgery; heent (); and heent (, ).     Allergies:  Latex, Acyclovir, Seasonal, and Adhesive tape    Current Medications:  Refer to pre-assessment nursing note    Home Set-Up/Prior Level of Function:  Lives With: Spouse  Type of Home: House  Home Layout: One level  Home Access: Stairs to enter without rails  Entrance Stairs - Number of Steps: 3  Bathroom Shower/Tub: Walk-in shower  Bathroom Toilet: Handicap height  Bathroom Equipment: Grab bars in shower, Shower chair, Grab bars around toilet  Home Equipment: Nichsharing.itte Hopes, rolling    Dominant Side:  Dominant Hand: : Right    Current Functional Status:   Ambulation:  [] Independent  [] Walk Indoors Only  [] Walk Outdoors  [x] Use Assistive Device  [] Use Wheelchair Only Dressing:  [x] [address] from Someone for:  [] Sock/Shoes  [] Pants  [] Everything   Bathing/Showering:   [x] Independent  [] Requires Assistance from Someone  [] [address]:  [] Routine house and yard work  [x] Light Housework Only  [] None     Objective:   PAIN:   Pre-Treatment Pain  Pain Assessment: 0-10  Pain Level: 5    Post Treatment: 5    Outcome Measure:   HOOS-JR:       KOOS-JR:  KOOS, . Knee Survey Score: 15    LOWER EXTREMITY GROSS EVALUATION:  RIGHT LE   Within Functional Limits   Abnormal   Comments   Strength [] []  4/5   Range of Motion [x] []        LEFT LE Within Functional Limits   Abnormal   Comments   Strength [] []  4/5   Range of Motion [] [] AROM LLE (degrees)  L Knee Flexion (0-145): 87  L Knee Extension (0): 0     UPPER EXTREMITY GROSS EVALUATION:     Within Functional Limits   Abnormal   Comments   Strength [x] []    Range of Motion [x] []      SENSATION  Sensation [x]       COGNITION/  PERCEPTION: Intact Impaired (Comments):   Orientation [x]     Vision [x]     Hearing [x]     Cognition  [x]       TRANSFERS: I Mod I S SBA CGA Min Mod Max Total  NT x2 Comments:   Sit to Stand [x] [] [] [] [] [] [] [] [] [] []    Stand to Sit [x] [] [] [] [] [] [] [] [] [] []    Stand pivot [] [] [] [] [] [] [] [] [] [] []     [] [] [] [] [] [] [] [] [] [] []    I=Independent, Mod I=Modified Independent, S=Supervision, SBA=Standby Assistance, CGA=Contact Guard Assistance,   Min=Minimal Assistance, Mod=Moderate Assistance, Max=Maximal Assistance, Total=Total Assistance, NT=Not Tested    BALANCE: Good Fair+ Fair Fair- Poor NT Comments   Sitting Static [x] [] [] [] [] []    Sitting Dynamic [x] [] [] [] [] []              Standing Static [] [x] [] [] [] []    Standing Dynamic [] [] [x] [] [] []      Postural Assessment:   Genu Valgus Right  Genu Valgus Left    GAIT: I Mod I S SBA CGA Min Mod Max Total  NT x2 Comments:   Level of Assistance [] [x] [] [] [] [] [] [] [] [] []    Weightbearing Status       Distance  200 feet    Gait Quality Antalgic, Decreased angel , Decreased step length, and Decreased stance    DME SPC     Stairs      Ramp     I=Independent, Mod I=Modified Independent, S=Supervision, SBA=Standby Assistance, CGA=Contact Guard Assistance,   Min=Minimal Assistance, Mod=Moderate Assistance, Max=Maximal Assistance, Total=Total Assistance, NT=Not Tested    TREATMENT:   EVALUATION: LOW COMPLEXITY: (Untimed Charge)    TREATMENT PLAN:   Effective Dates: 11/29/2022 TO 11/29/2022. Treatment/Session Assessment:  Patient was instructed in PT- HEP to increase strength and ROM in LEs. Answered all questions. Frequency/Duration: Patient to continue to perform home exercise program at least twice per day up until her surgery. EDUCATION: Education Given To: Patient  Education Provided: Role of Therapy, Plan of Care, Home Exercise Program  Education Method: Verbal  Education Outcome: Verbalized understanding    MEDICAL NECESSITY: Ms. Waqas Vieira is expected to optimize herlower extremity strength and ROM in preparation for joint replacement surgery. REASON FOR CONTINUED SERVICES: Achieve baseline assesment of musculoskeletal system, functional mobility and home environment. , educate in PT HEP in preparation for surgery, educate in hospital plan of care. COMPLIANCE WITH PROGRAM/EXERCISE: Will assess as treatment progresses. TOTAL TREATMENT DURATION:  Time In: 1030  Time Out: [address]  Minutes: 15    Regarding Keyana Jolly's therapy, I certify that the treatment plan above will be carried out by a therapist or under their direction.   Thank you for this referral,  Farzaneh Kim, PT

## 2022-11-29 NOTE — PROGRESS NOTES
11/29/22 1000   Treatment   Treatment Type Bedside spirometry   Oxygen Therapy/Pulse Ox   O2 Therapy Room air   Heart Rate 58   SpO2 99 %   Pulse Oximeter Device Mode Intermittent   $Pulse Oximeter $Spot check (single)   Bedside Spirometry   FEV-1/Actual (Liters) 2.5 Liters   FEV-1/Predicted (Liters) 100 Liters   Initial respiratory Assessment completed with pt. Pt was interviewed and evaluated in Joint camp prior to surgery. Patient ID:  Aroldo Kincaid  [id number]  89 y.o.  1950  Surgeon: Dr. Julieth Bradford  Date of Surgery: [email or web address]  Procedure: Total Left Knee Arthroplasty  Primary Care Physician: Morris Aguilar [phone]  Specialists:     BS:CLEAR      Pt taught proper COUGH technique  IS REVIEWED WITH PT AS WELL AS BENEFITS OF USING IS IN SEDENTARY PTS. DIAPHRAGMATIC BREATHING EXERCISE INSTRUCTIONS GIVEN    History of smoking:   DENIES                 Quit date:         Secondhand smoke:FATHER    Past procedures with Oxygen desaturation or delayed awakening:DENIES    Past Medical History:   Diagnosis Date    Arrhythmia 2005    SVT in past, has atrial septal defect, has hx of atrial arrythmias-last time-? 2005    Arthritis     osteo    Hypertension     stopped medicine about 6 months ago, bp under control due to weight loss    Lipedema     Lipedema     right lower leg    Obesity (BMI 30-39.9) 11/14/2011    BMI-39.95    Thyroid disease 20 years    on medication        Respiratory history:DENIES SOB                                                                   Respiratory meds:  DENIES    FAMILY PRESENT:             NO     PAST SLEEP STUDY:                        DENIES  HX OF YONG:                                       DENIES  YONG assessment:     DANGERS OF UNTREATED YONG EXPLAINED TO PT.                                          SLEEPS ON SIDE         PHYSICAL EXAM   Body mass index is 39.95 kg/m².    Vitals:    11/29/22 1010   BP: (!) 178/80   Pulse: 63   Temp: 97.7 °F (36.5 °C) SpO2: 100%     Neck circumference:   32   cm    Loud snoring:                                                 YES             Witnessed apnea or wakening gasping or choking:        DENIES         Awakens with headaches:                                               DENIES  Morning or daytime tiredness/ sleepiness:                               TIRED  Dry mouth or sore throat in morning:            YES                                              Burger stage:  3                                   SACS score:3  Stop Bang                                       CS HS  RESPIRATORY ASSESSMENT Q SHIFT   O2 PRN    ALBUTEROL  NEBULIZER Q6 PRN WHEEZING                                        Referrals:  DECLINED  Pt.  Phone Number:

## 2022-11-29 NOTE — PERIOP NOTE
Patient verified name and . Order for consent is found in EHR and matches case posting; patient verified. Type 3 surgery, Joint camp assessment complete. Labs per surgeon: CBC,BMP, PT/PTT, mrsa swab ; results (processing)  Labs per anesthesia protocol: no additional  EKG:done today - within [facility] protocols. Pt states hx of ASD dx via echo many years ago. Denies SOB, CP. No audible murmur heard at PAT. MRSA/MSSA swab collected; pharmacy to review and dose antibiotic as appropriate. Hospital approved surgical skin cleanser and instructions to return bottle on DOS given per hospital policy. Patient provided with handouts including Guide to Surgery, Pain Management, Hand Hygiene, Blood Transfusion Education, and [place] [facility] Brochure. Patient answered medical/surgical history questions at their best of ability. All prior to admission medications documented in [facility]. Original medication prescription bottle were visualized during patient appointment. Patient instructed to hold all vitamins 3 weeks prior to surgery and NSAIDS 5 days prior to surgery. Patient teach back successful and patient demonstrates knowledge of instruction. Detail Level: Detailed

## 2022-11-29 NOTE — PERIOP NOTE
PLEASE CONTINUE TAKING ALL PRESCRIPTION MEDICATIONS UP TO THE DAY OF SURGERY UNLESS OTHERWISE DIRECTED BELOW. DISCONTINUE all vitamins, herbals and supplements 21 days prior to surgery. DISCONTINUE Non-Steriodal Anti-Inflammatory (NSAIDS) such as Advil, Ibuprofen, Motrin, Naproxen and Aleve 5 days prior to surgery. Home Medications to take  the day of surgery      Eye drops     Hydroxyzine if needed     Levothyroxine     Home Medications   to Hold     Hold Naproxen for 5 days prior to surgery        Comments   Take 1 capful of Miralax daily starting one week prior to surgery. On the day before surgery please take Acetaminophen 1000mg in the morning and then again before bed. You may substitute for Tylenol 650 mg.    Bring Dynahex wash and Incentive Spirometer with you to hospital on the day of surgery. Please do not bring home medications with you on the day of surgery unless otherwise directed by your nurse. If you are instructed to bring home medications, please give them to your nurse as they will be administered by the nursing staff. If you have any questions, please call Autumn Mcneill [phone]. A copy of this note was provided to the patient for reference.

## 2022-11-29 NOTE — PROGRESS NOTES
Total Joint Surgery Preoperative Chart Review      Patient ID:  Jeniffer Christine  [id number]  89 y.o.  1950  Surgeon: Dr. Aruna New  Date of Surgery: 2022  Procedure: Total Left Knee Arthroplasty  Primary Care Physician: Pratik Garcia [phone]  Specialty Physician(s):      Subjective:   Jeniffer Christine is a 73 y.o. White (non-) female who presents for preoperative evaluation for Total Left Knee arthroplasty. This is a preoperative chart review note based on data collected by the nurse at the surgical Pre-Assessment visit. Past Medical History:   Diagnosis Date    Arrhythmia 2005    SVT in past, has atrial septal defect, has hx of atrial arrythmias-last time-?     Arthritis     osteo    Hypertension     stopped medicine about 6 months ago, bp under control due to weight loss    Lipedema     Lipedema     right lower leg    Obesity (BMI 30-39.9) 2011    BMI-39.95    Thyroid disease 20 years    on medication      Past Surgical History:   Procedure Laterality Date    GYN      ALISON    HEENT      deviated septum    HEENT      blocked tear duct Lt eye then cataract to Lt eye    HEENT  ,     Blocked tear duct Rt eye with stint ;CE R also    UROLOGICAL SURGERY      bladder tack     Family History   Problem Relation Age of Onset    Thyroid Disease Mother     Asthma Mother     Heart Disease Brother     Heart Attack Brother     Diabetes Brother     Heart Disease Father     Hypertension Father     Diabetes Sister         just lost one sister out of 2 to covid.       Neuropathy Neg Hx     Neurofibromatosis Neg Hx     Osteoporosis Neg Hx     Ovarian Cancer Neg Hx     Pacemaker Neg Hx     Panic Disorder Neg Hx     Parkinsonism Neg Hx     Premature Birth Neg Hx      Labor Neg Hx     Psoriasis Neg Hx     Psychiatric Disorder Neg Hx     Psychotic Disorder Neg Hx     Rashes/Skin Problems Neg Hx     Retinal Detachment Neg Hx     Schizophrenia Neg Hx     Seizures Neg Hx     Severe Sprains Neg Hx     Sickle Cell Anemia Neg Hx     Sickle Cell Trait Neg Hx     SIDS Neg Hx     Lupus Neg Hx     Spont Abortions Neg Hx     Stomach Cancer Neg Hx     Strabismus Neg Hx     Stroke Neg Hx     Substance Abuse Neg Hx     Sudden Death Neg Hx     Suicide Neg Hx     Tuberculosis Neg Hx     Ulcerative Colitis Neg Hx     Urticaria Neg Hx     Sandeep's Disease Neg Hx     Malig Hypertherm Neg Hx     Pseudochol.  Deficiency Neg Hx     Delayed Awakening Neg Hx     Post-op Nausea/Vomiting Neg Hx     Other Neg Hx     Emergence Delirium Neg Hx     Post-op Cognitive Dysfunction Neg Hx     Alcohol Abuse Neg Hx     Glaucoma Neg Hx     Headache Neg Hx     Heart Defect Neg Hx     Heart Failure Neg Hx     Heart Surgery Neg Hx     Hemochromatosis Neg Hx     High Cholesterol Neg Hx     Immunodeficiency Neg Hx     Inflam Bowel Dis Neg Hx     Allergic Rhinitis Neg Hx     Allergy (Severe) Neg Hx     Amblyopia Neg Hx     Anemia Neg Hx     Anesth Problems Neg Hx     Angioedema Neg Hx     Anxiety Disorder Neg Hx     Arrhythmia Neg Hx     Osteoarthritis Neg Hx     Rheum Arthritis Neg Hx     Ataxia Neg Hx     Atopy Neg Hx     Bipolar Disorder Neg Hx     Bleeding Prob Neg Hx     Blindness Neg Hx     Breast Cancer Neg Hx     Broken Bones Neg Hx     Cancer Neg Hx     Cataracts Neg Hx     Celiac Disease Neg Hx     Chdhd Hrt Surg Neg Hx     Chdhd Resp Dis Neg Hx     Chorea Neg Hx     Clotting Disorder Neg Hx     Collagen Disease Neg Hx     Colon Cancer Neg Hx     Colon Polyps Neg Hx     COPD Neg Hx     Coronary Art Dis Neg Hx     Crohn's Disease Neg Hx     Cystic Fibrosis Neg Hx     Dementia Neg Hx     Depression Neg Hx     Dislocations Neg Hx     Down Syndrome Neg Hx     Drug Abuse Neg Hx     Eclampsia Neg Hx     Eczema Neg Hx     Elevated Lipids Neg Hx     Emphysema Neg Hx     Fainting Neg Hx     Kidney Disease Neg Hx     Liver Disease Neg Hx     Lung Disease Neg Hx     Macular Degen Neg Hx     Mental Retardation Neg Hx Migraines Neg Hx     Mult Sclerosis Neg Hx       Social History     Tobacco Use    Smoking status: Never    Smokeless tobacco: Never   Substance Use Topics    Alcohol use: Yes     Comment: rare       Prior to Admission medications    Medication Sig Start Date End Date Taking?  Authorizing Provider   cetirizine (ZYRTEC) 10 MG tablet Take 10 mg by mouth nightly   Yes Historical Provider, MD   Glycerin-Polysorbate 80 (REFRESH DRY EYE THERAPY OP) Apply to eye as needed   Yes Historical Provider, MD   levothyroxine (SYNTHROID) 75 MCG tablet Take 1 tablet by mouth every morning (before breakfast) 7/7/22   Aurelia Malik MD   lisinopril (PRINIVIL;ZESTRIL) 20 MG tablet Take 1 tablet by mouth daily  Patient not taking: Reported on 11/29/2022 7/7/22   Aurelia Malik MD   oxybutynin (DITROPAN-XL) 10 MG extended release tablet Take 1 tablet by mouth daily  Patient taking differently: Take 10 mg by mouth at bedtime 7/7/22   Aurelia Malik MD   omeprazole (PRILOSEC OTC) 20 MG tablet Take 1 tablet by mouth daily  Patient taking differently: Take 20 mg by mouth daily as needed 7/7/22   Aurelia Malik MD   hydrOXYzine HCl (ATARAX) 25 MG tablet Take 1 tablet by mouth 4 times daily as needed for Anxiety 7/7/22   Aurelia Malik MD   diphenhydrAMINE (BENADRYL) 25 MG tablet Take 25 mg by mouth One HS for allergies    Historical Provider, MD   Multiple Vitamins-Minerals (THERAPEUTIC MULTIVITAMIN-MINERALS) tablet Take 1 tablet by mouth daily    Historical Provider, MD   baclofen (LIORESAL) 10 MG tablet Take 1 tablet by mouth 3 times daily  Patient not taking: Reported on 11/29/2022 5/27/22   Yue Primrose, APRN - CNP   Capsaicin (THERAGEN EX) Take 1 tablet by mouth  Patient not taking: No sig reported    Historical Provider, MD   L-LYSINE PO Take by mouth daily     Ar Automatic Reconciliation   Cholecalciferol 50 MCG (2000 UT) TABS Take 2,000 Units by mouth daily    Ar Automatic Reconciliation   naproxen sodium (ANAPROX) 220 MG tablet Take 220 mg by mouth 2 times daily (with meals)    Ar Automatic Reconciliation     Allergies   Allergen Reactions    Latex Rash    Acyclovir Other (See Comments) and Rash     fever    Seasonal     Adhesive Tape Rash          Objective:     Physical Exam:   Patient Vitals for the past 24 hrs:   Temp Pulse BP SpO2   11/29/22 1010 97.7 °F (36.5 °C) 63 (!) 178/80 100 %       ECG:    No results found for this or any previous visit (from the past 4464 hour(s)).     Data Review:   Labs:   Recent Results (from the past 24 hour(s))   Protime-INR    Collection Time: 11/29/22  9:59 AM   Result Value Ref Range    Protime 13.6 12.6 - 14.3 sec    INR 1.0     Hemoglobin A1c    Collection Time: 11/29/22  9:59 AM   Result Value Ref Range    Hemoglobin A1C 5.6 4.8 - 5.6 %    eAG 114 mg/dL   CBC with Auto Differential    Collection Time: 11/29/22  9:59 AM   Result Value Ref Range    WBC 6.1 4.3 - 11.1 K/uL    RBC 4.08 4.05 - 5.2 M/uL    Hemoglobin 12.3 11.7 - 15.4 g/dL    Hematocrit 38.7 35.8 - 46.3 %    MCV 94.9 82.0 - 102.0 FL    MCH 30.1 26.1 - 32.9 PG    MCHC 31.8 31.4 - 35.0 g/dL    RDW 13.2 11.9 - 14.6 %    Platelets 532 741 - 219 K/uL    MPV 10.9 9.4 - 12.3 FL    nRBC 0.00 0.0 - 0.2 K/uL    Differential Type AUTOMATED      Seg Neutrophils 64 43 - 78 %    Lymphocytes 26 13 - 44 %    Monocytes 7 4.0 - 12.0 %    Eosinophils % 3 0.5 - 7.8 %    Basophils 1 0.0 - 2.0 %    Immature Granulocytes 0 0.0 - 5.0 %    Segs Absolute 3.9 1.7 - 8.2 K/UL    Absolute Lymph # 1.6 0.5 - 4.6 K/UL    Absolute Mono # 0.4 0.1 - 1.3 K/UL    Absolute Eos # 0.2 0.0 - 0.8 K/UL    Basophils Absolute 0.1 0.0 - 0.2 K/UL    Absolute Immature Granulocyte 0.0 0.0 - 0.5 K/UL   Basic Metabolic Panel    Collection Time: 11/29/22  9:59 AM   Result Value Ref Range    Sodium 140 133 - 143 mmol/L    Potassium 3.6 3.5 - 5.1 mmol/L    Chloride 107 101 - 110 mmol/L    CO2 29 21 - 32 mmol/L    Anion Gap 4 2 - 11 mmol/L    Glucose 83 65 - 100 mg/dL BUN 32 (H) 8 - 23 MG/DL    Creatinine 0.71 0.6 - 1.0 MG/DL    Est, Glom Filt Rate >60 >60 ml/min/1.73m2    Calcium 9.4 8.3 - 10.4 MG/DL   APTT    Collection Time: 11/29/22  9:59 AM   Result Value Ref Range    PTT 36.2 (H) 24.5 - 34.2 SEC   EKG 12 Lead    Collection Time: 11/29/22 11:07 AM   Result Value Ref Range    Ventricular Rate 58 BPM    Atrial Rate 58 BPM    P-R Interval 136 ms    QRS Duration 80 ms    Q-T Interval 424 ms    QTc Calculation (Bazett) 416 ms    P Axis 47 degrees    R Axis 64 degrees    T Axis 35 degrees    Diagnosis Sinus bradycardia          Problem List:  )  Patient Active Problem List   Diagnosis    Age-related osteoporosis without current pathological fracture    Major depressive disorder, recurrent, mild (HCC)    Lipedema    Lymphedema of both lower extremities    Pain in both lower extremities    Spinal stenosis of lumbar region without neurogenic claudication    Situational anxiety    Acute pain of left shoulder    Home help needed    Degenerative arthritis of left knee       Total Joint Surgery Pre-Assessment Recommendations:           Suspected YONG with BMI 40. Recommend oxygen saturation monitoring during hospitalization and oxygen at 3 liter via [facility].       Signed By: KELSIE Cook - CNP-C    November 29, 2022

## 2022-11-29 NOTE — PERIOP NOTE
Labs done today within anesthesia protocols except PTT - will have [facility] review.       Latest Reference Range & Units 11/29/22 09:59   Sodium 133 - 143 mmol/L 140   Potassium 3.5 - 5.1 mmol/L 3.6   Chloride 101 - 110 mmol/L 107   CO2 21 - 32 mmol/L 29   BUN,BUNPL 8 - 23 MG/DL 32 (H)   Creatinine 0.6 - 1.0 MG/DL 0.71   Anion Gap 2 - 11 mmol/L 4   Est, Glom Filt Rate >60 ml/min/1.73m2 >60   Glucose, Random 65 - 100 mg/dL 83   CALCIUM, SERUM, 809789 8.3 - 10.4 MG/DL 9.4   Hemoglobin A1C 4.8 - 5.6 % 5.6   eAG (mg/dL) mg/dL 114   WBC 4.3 - 11.1 K/uL 6.1   RBC 4.05 - 5.2 M/uL 4.08   Hemoglobin Quant 11.7 - 15.4 g/dL 12.3   Hematocrit 35.8 - 46.3 % 38.7   MCV 82.0 - 102.0 FL 94.9   MCH 26.1 - 32.9 PG 30.1   MCHC 31.4 - 35.0 g/dL 31.8   MPV 9.4 - 12.3 FL 10.9   RDW 11.9 - 14.6 % 13.2   Platelet Count 925 - 450 K/uL 305   Absolute Mono # 0.1 - 1.3 K/UL 0.4   Eosinophils % 0.5 - 7.8 % 3   Basophils Absolute 0.0 - 0.2 K/UL 0.1   Differential Type -   AUTOMATED   Seg Neutrophils 43 - 78 % 64   Segs Absolute 1.7 - 8.2 K/UL 3.9   Lymphocytes 13 - 44 % 26   Absolute Lymph # 0.5 - 4.6 K/UL 1.6   Monocytes 4.0 - 12.0 % 7   Absolute Eos # 0.0 - 0.8 K/UL 0.2   Basophils 0.0 - 2.0 % 1   Immature Granulocytes 0.0 - 5.0 % 0   Nucleated Red Blood Cells 0.0 - 0.2 K/uL 0.00   Absolute Immature Granulocyte 0.0 - 0.5 K/UL 0.0   Prothrombin Time 12.6 - 14.3 sec 13.6   INR -   1.0   PTT 24.5 - 34.2 SEC 36.2 (H)   MSSA/MRSA SCREEN BY PCR  Rpt   (H): Data is abnormally high  Rpt: View report in Results Review for more information

## 2022-11-30 DIAGNOSIS — M17.12 PRIMARY OSTEOARTHRITIS OF LEFT KNEE: ICD-10-CM

## 2022-12-06 DIAGNOSIS — F41.8 SITUATIONAL ANXIETY: ICD-10-CM

## 2022-12-07 ENCOUNTER — OFFICE VISIT (OUTPATIENT)
Dept: ORTHOPEDIC SURGERY | Age: 72
End: 2022-12-07

## 2022-12-07 DIAGNOSIS — M17.12 PRIMARY OSTEOARTHRITIS OF LEFT KNEE: Primary | ICD-10-CM

## 2022-12-07 RX ORDER — HYDROXYZINE HYDROCHLORIDE 25 MG/1
25 TABLET, FILM COATED ORAL 4 TIMES DAILY PRN
Qty: 120 TABLET | Refills: 1 | Status: SHIPPED | OUTPATIENT
Start: 2022-12-07

## 2022-12-07 NOTE — TELEPHONE ENCOUNTER
Hydroxyzine was last filled on 7/7/22 with 120 tablets and 3 refills. Next appointment is on 1/9/23 with Dr Marti Nolan. Med pended for provider.

## 2022-12-07 NOTE — H&P (VIEW-ONLY)
[facility]  Pre Operative History and Physical Exam    Patient ID:  Trey Seay  [id number]  70 y.o.  1950    Today: December 7, 2022           CC:  left knee pain    HPI:   The patient has  OA left knee. The patient was evaluated and examined during a consultation prior to this office visit. There have been no changes to the patient's orthopedic condition since the initial consultation. The patient has failed previous conservative treatment for this condition including antiinflammatories , and lifestyle modifications. The necessity for joint replacement is present. The patient will be admitted the day of surgery for left knee replacement. Past Medical/Surgical History:  Past Medical History:   Diagnosis Date    Arrhythmia 2005    SVT in past, has atrial septal defect, has hx of atrial arrythmias-last time-? 2005    Arthritis     osteo    Hypertension     stopped medicine about 6 months ago, bp under control due to weight loss    Lipedema     Lipedema     right lower leg    Obesity (BMI 30-39.9) 11/14/2011    BMI-39.95    Thyroid disease 20 years    on medication     Past Surgical History:   Procedure Laterality Date    GYN  2007    ALISON    HEENT      deviated septum    HEENT  2003    blocked tear duct Lt eye then cataract to Lt eye    HEENT  2008, 2009    Blocked tear duct Rt eye with stint ;CE R also    UROLOGICAL SURGERY      bladder tack        Allergies:    Allergies   Allergen Reactions    Latex Rash    Acyclovir Other (See Comments) and Rash     fever    Seasonal     Adhesive Tape Rash        Physical Exam:   General: NAD, Alert, Oriented, Appears their stated age     [de-identified]: NC/AT    Skin: No rashes, lesions or wounds seen      Psych: normal affect      Heart: Regular Rate, Rhythm     Lungs: unlabored respirations, no wheezing    Abdomen: Soft and non-distended     Ortho: Pain with limited ROM of the left knee    Neuro: no focal defects, moving extremities equally    Lymph: no lymphadenopathy     Meds:   Current Outpatient Medications   Medication Sig    hydrOXYzine HCl (ATARAX) 25 MG tablet Take 1 tablet by mouth 4 times daily as needed for Anxiety    cetirizine (ZYRTEC) 10 MG tablet Take 10 mg by mouth nightly    Glycerin-Polysorbate 80 (REFRESH DRY EYE THERAPY OP) Apply to eye as needed    levothyroxine (SYNTHROID) 75 MCG tablet Take 1 tablet by mouth every morning (before breakfast)    lisinopril (PRINIVIL;ZESTRIL) 20 MG tablet Take 1 tablet by mouth daily (Patient not taking: Reported on 11/29/2022)    oxybutynin (DITROPAN-XL) 10 MG extended release tablet Take 1 tablet by mouth daily (Patient taking differently: Take 10 mg by mouth at bedtime)    omeprazole (PRILOSEC OTC) 20 MG tablet Take 1 tablet by mouth daily (Patient taking differently: Take 20 mg by mouth daily as needed)    diphenhydrAMINE (BENADRYL) 25 MG tablet Take 25 mg by mouth One HS for allergies    Multiple Vitamins-Minerals (THERAPEUTIC MULTIVITAMIN-MINERALS) tablet Take 1 tablet by mouth daily    baclofen (LIORESAL) 10 MG tablet Take 1 tablet by mouth 3 times daily (Patient not taking: Reported on 11/29/2022)    Capsaicin (THERAGEN EX) Take 1 tablet by mouth (Patient not taking: No sig reported)    L-LYSINE PO Take by mouth daily     Cholecalciferol 50 MCG (2000 UT) TABS Take 2,000 Units by mouth daily    naproxen sodium (ANAPROX) 220 MG tablet Take 220 mg by mouth 2 times daily (with meals)     No current facility-administered medications for this visit.          Labs:  Hospital Outpatient Visit on 11/29/2022   Component Date Value Ref Range Status    Protime 11/29/2022 13.6  12.6 - 14.3 sec Final    INR 11/29/2022 1.0    Final    Comment: Suggested therapeutic INR range:  Venous thrombosis and embolus  2.0-3.0  Prosthetic heart valve         2.5-3.5  ** Note new reference range and method **      Hemoglobin A1C 11/29/2022 5.6  4.8 - 5.6 % Final    eAG 11/29/2022 114  mg/dL Final    Comment: Reference Range  Normal: 4.8-5.6  Diabetic >=6.5%  Normal       <5.7%      WBC 11/29/2022 6.1  4.3 - 11.1 K/uL Final    RBC 11/29/2022 4.08  4.05 - 5.2 M/uL Final    Hemoglobin 11/29/2022 12.3  11.7 - 15.4 g/dL Final    Hematocrit 11/29/2022 38.7  35.8 - 46.3 % Final    MCV 11/29/2022 94.9  82.0 - 102.0 FL Final    MCH 11/29/2022 30.1  26.1 - 32.9 PG Final    MCHC 11/29/2022 31.8  31.4 - 35.0 g/dL Final    RDW 11/29/2022 13.2  11.9 - 14.6 % Final    Platelets 39/40/7777 305  150 - 450 K/uL Final    MPV 11/29/2022 10.9  9.4 - 12.3 FL Final    nRBC 11/29/2022 0.00  0.0 - 0.2 K/uL Final    **Note: Absolute NRBC parameter is now reported with Hemogram**    Differential Type 11/29/2022 AUTOMATED    Final    Seg Neutrophils 11/29/2022 64  43 - 78 % Final    Lymphocytes 11/29/2022 26  13 - 44 % Final    Monocytes 11/29/2022 7  4.0 - 12.0 % Final    Eosinophils % 11/29/2022 3  0.5 - 7.8 % Final    Basophils 11/29/2022 1  0.0 - 2.0 % Final    Immature Granulocytes 11/29/2022 0  0.0 - 5.0 % Final    Segs Absolute 11/29/2022 3.9  1.7 - 8.2 K/UL Final    Absolute Lymph # 11/29/2022 1.6  0.5 - 4.6 K/UL Final    Absolute Mono # 11/29/2022 0.4  0.1 - 1.3 K/UL Final    Absolute Eos # 11/29/2022 0.2  0.0 - 0.8 K/UL Final    Basophils Absolute 11/29/2022 0.1  0.0 - 0.2 K/UL Final    Absolute Immature Granulocyte 11/29/2022 0.0  0.0 - 0.5 K/UL Final    Sodium 11/29/2022 140  133 - 143 mmol/L Final    Potassium 11/29/2022 3.6  3.5 - 5.1 mmol/L Final    Chloride 11/29/2022 107  101 - 110 mmol/L Final    CO2 11/29/2022 29  21 - 32 mmol/L Final    Anion Gap 11/29/2022 4  2 - 11 mmol/L Final    Glucose 11/29/2022 83  65 - 100 mg/dL Final    BUN 11/29/2022 32 (A)  8 - 23 MG/DL Final    Creatinine 11/29/2022 0.71  0.6 - 1.0 MG/DL Final    Est, Glom Filt Rate 11/29/2022 >60  >60 ml/min/1.73m2 Final    Comment:      Pediatric calculator link: [email or web address]_[email or web address]       Effective Oct 3, 2022       These results are not intended for use in patients <25years of age. eGFR results are calculated without a race factor using  the 2021 CKD-EPI equation. Careful clinical correlation is recommended, particularly when comparing to results calculated using previous equations. The CKD-EPI equation is less accurate in patients with extremes of muscle mass, extra-renal metabolism of creatinine, excessive creatine ingestion, or following therapy that affects renal tubular secretion. Calcium 11/29/2022 9.4  8.3 - 10.4 MG/DL Final    PTT 11/29/2022 36.2 (A)  24.5 - 34.2 SEC Final    Comment: Heparin Therapeutic Range = 74 - 123 seconds  In addition to factor deficiency, monitoring heparin therapy, etc., evaluation of a prolonged aPTT result should include consideration of preanalytic variables such as heparin flush contamination, specimen integrity issues, etc.      Special Requests 11/29/2022 NO SPECIAL REQUESTS    Final    Culture 11/29/2022 MRSA target DNA detected, SA target DNA detected. A positive test result does not necessarily indicate the presence of viable organisms. It is however, presumptive for the presence of MRSA or SA.  (A)    Final    Ventricular Rate 11/29/2022 58  BPM Final    Atrial Rate 11/29/2022 58  BPM Final    P-R Interval 11/29/2022 136  ms Final    QRS Duration 11/29/2022 80  ms Final    Q-T Interval 11/29/2022 424  ms Final    QTc Calculation (Bazett) 11/29/2022 416  ms Final    P Axis 11/29/2022 47  degrees Final    R Axis 11/29/2022 64  degrees Final    T Axis 11/29/2022 35  degrees Final    Diagnosis 11/29/2022 Sinus bradycardia   Final                 Patient Active Problem List   Diagnosis    Age-related osteoporosis without current pathological fracture    Major depressive disorder, recurrent, mild (HCC)    Lipedema    Lymphedema of both lower extremities    Pain in both lower extremities    Spinal stenosis of lumbar region without neurogenic claudication    Situational anxiety    Acute pain

## 2022-12-07 NOTE — PROGRESS NOTES
[id number] [facility]  Pre Operative History and Physical Exam    Patient ID:  Trey Seay  [id number]  35 y.o.  1950    Today: December 7, 2022           CC:  left knee pain    HPI:   The patient has  OA left knee. The patient was evaluated and examined during a consultation prior to this office visit. There have been no changes to the patient's orthopedic condition since the initial consultation. The patient has failed previous conservative treatment for this condition including antiinflammatories , and lifestyle modifications. The necessity for joint replacement is present. The patient will be admitted the day of surgery for left knee replacement. Past Medical/Surgical History:  Past Medical History:   Diagnosis Date    Arrhythmia 2005    SVT in past, has atrial septal defect, has hx of atrial arrythmias-last time-? 2005    Arthritis     osteo    Hypertension     stopped medicine about 6 months ago, bp under control due to weight loss    Lipedema     Lipedema     right lower leg    Obesity (BMI 30-39.9) 11/14/2011    BMI-39.95    Thyroid disease 20 years    on medication     Past Surgical History:   Procedure Laterality Date    GYN  2007    ALISON    HEENT      deviated septum    HEENT  2003    blocked tear duct Lt eye then cataract to Lt eye    HEENT  2008, 2009    Blocked tear duct Rt eye with stint ;CE R also    UROLOGICAL SURGERY      bladder tack        Allergies:    Allergies   Allergen Reactions    Latex Rash    Acyclovir Other (See Comments) and Rash     fever    Seasonal     Adhesive Tape Rash        Physical Exam:   General: NAD, Alert, Oriented, Appears their stated age     [de-identified]: NC/AT    Skin: No rashes, lesions or wounds seen      Psych: normal affect      Heart: Regular Rate, Rhythm     Lungs: unlabored respirations, no wheezing    Abdomen: Soft and non-distended     Ortho: Pain with limited ROM of the left knee    Neuro: no focal defects, moving extremities equally    Lymph: no lymphadenopathy     Meds:   Current Outpatient Medications   Medication Sig    hydrOXYzine HCl (ATARAX) 25 MG tablet Take 1 tablet by mouth 4 times daily as needed for Anxiety    cetirizine (ZYRTEC) 10 MG tablet Take 10 mg by mouth nightly    Glycerin-Polysorbate 80 (REFRESH DRY EYE THERAPY OP) Apply to eye as needed    levothyroxine (SYNTHROID) 75 MCG tablet Take 1 tablet by mouth every morning (before breakfast)    lisinopril (PRINIVIL;ZESTRIL) 20 MG tablet Take 1 tablet by mouth daily (Patient not taking: Reported on 11/29/2022)    oxybutynin (DITROPAN-XL) 10 MG extended release tablet Take 1 tablet by mouth daily (Patient taking differently: Take 10 mg by mouth at bedtime)    omeprazole (PRILOSEC OTC) 20 MG tablet Take 1 tablet by mouth daily (Patient taking differently: Take 20 mg by mouth daily as needed)    diphenhydrAMINE (BENADRYL) 25 MG tablet Take 25 mg by mouth One HS for allergies    Multiple Vitamins-Minerals (THERAPEUTIC MULTIVITAMIN-MINERALS) tablet Take 1 tablet by mouth daily    baclofen (LIORESAL) 10 MG tablet Take 1 tablet by mouth 3 times daily (Patient not taking: Reported on 11/29/2022)    Capsaicin (THERAGEN EX) Take 1 tablet by mouth (Patient not taking: No sig reported)    L-LYSINE PO Take by mouth daily     Cholecalciferol 50 MCG (2000 UT) TABS Take 2,000 Units by mouth daily    naproxen sodium (ANAPROX) 220 MG tablet Take 220 mg by mouth 2 times daily (with meals)     No current facility-administered medications for this visit.          Labs:  Hospital Outpatient Visit on 11/29/2022   Component Date Value Ref Range Status    Protime 11/29/2022 13.6  12.6 - 14.3 sec Final    INR 11/29/2022 1.0    Final    Comment: Suggested therapeutic INR range:  Venous thrombosis and embolus  2.0-3.0  Prosthetic heart valve         2.5-3.5  ** Note new reference range and method **      Hemoglobin A1C 11/29/2022 5.6  4.8 - 5.6 % Final    eAG 11/29/2022 114  mg/dL Final    Comment: Reference Range  Normal: 4.8-5.6  Diabetic >=6.5%  Normal       <5.7%      WBC 11/29/2022 6.1  4.3 - 11.1 K/uL Final    RBC 11/29/2022 4.08  4.05 - 5.2 M/uL Final    Hemoglobin 11/29/2022 12.3  11.7 - 15.4 g/dL Final    Hematocrit 11/29/2022 38.7  35.8 - 46.3 % Final    MCV 11/29/2022 94.9  82.0 - 102.0 FL Final    MCH 11/29/2022 30.1  26.1 - 32.9 PG Final    MCHC 11/29/2022 31.8  31.4 - 35.0 g/dL Final    RDW 11/29/2022 13.2  11.9 - 14.6 % Final    Platelets 98/83/8934 305  150 - 450 K/uL Final    MPV 11/29/2022 10.9  9.4 - 12.3 FL Final    nRBC 11/29/2022 0.00  0.0 - 0.2 K/uL Final    **Note: Absolute NRBC parameter is now reported with Hemogram**    Differential Type 11/29/2022 AUTOMATED    Final    Seg Neutrophils 11/29/2022 64  43 - 78 % Final    Lymphocytes 11/29/2022 26  13 - 44 % Final    Monocytes 11/29/2022 7  4.0 - 12.0 % Final    Eosinophils % 11/29/2022 3  0.5 - 7.8 % Final    Basophils 11/29/2022 1  0.0 - 2.0 % Final    Immature Granulocytes 11/29/2022 0  0.0 - 5.0 % Final    Segs Absolute 11/29/2022 3.9  1.7 - 8.2 K/UL Final    Absolute Lymph # 11/29/2022 1.6  0.5 - 4.6 K/UL Final    Absolute Mono # 11/29/2022 0.4  0.1 - 1.3 K/UL Final    Absolute Eos # 11/29/2022 0.2  0.0 - 0.8 K/UL Final    Basophils Absolute 11/29/2022 0.1  0.0 - 0.2 K/UL Final    Absolute Immature Granulocyte 11/29/2022 0.0  0.0 - 0.5 K/UL Final    Sodium 11/29/2022 140  133 - 143 mmol/L Final    Potassium 11/29/2022 3.6  3.5 - 5.1 mmol/L Final    Chloride 11/29/2022 107  101 - 110 mmol/L Final    CO2 11/29/2022 29  21 - 32 mmol/L Final    Anion Gap 11/29/2022 4  2 - 11 mmol/L Final    Glucose 11/29/2022 83  65 - 100 mg/dL Final    BUN 11/29/2022 32 (A)  8 - 23 MG/DL Final    Creatinine 11/29/2022 0.71  0.6 - 1.0 MG/DL Final    Est, Glom Filt Rate 11/29/2022 >60  >60 ml/min/1.73m2 Final    Comment:      Pediatric calculator link: [email or web address]       Effective Oct 3, 2022       These results are not intended for use in patients <25years of age. eGFR results are calculated without a race factor using  the 2021 CKD-EPI equation. Careful clinical correlation is recommended, particularly when comparing to results calculated using previous equations. The CKD-EPI equation is less accurate in patients with extremes of muscle mass, extra-renal metabolism of creatinine, excessive creatine ingestion, or following therapy that affects renal tubular secretion. Calcium 11/29/2022 9.4  8.3 - 10.4 MG/DL Final    PTT 11/29/2022 36.2 (A)  24.5 - 34.2 SEC Final    Comment: Heparin Therapeutic Range = 74 - 123 seconds  In addition to factor deficiency, monitoring heparin therapy, etc., evaluation of a prolonged aPTT result should include consideration of preanalytic variables such as heparin flush contamination, specimen integrity issues, etc.      Special Requests 11/29/2022 NO SPECIAL REQUESTS    Final    Culture 11/29/2022 MRSA target DNA detected, SA target DNA detected. A positive test result does not necessarily indicate the presence of viable organisms. It is however, presumptive for the presence of MRSA or SA.  (A)    Final    Ventricular Rate 11/29/2022 58  BPM Final    Atrial Rate 11/29/2022 58  BPM Final    P-R Interval 11/29/2022 136  ms Final    QRS Duration 11/29/2022 80  ms Final    Q-T Interval 11/29/2022 424  ms Final    QTc Calculation (Bazett) 11/29/2022 416  ms Final    P Axis 11/29/2022 47  degrees Final    R Axis 11/29/2022 64  degrees Final    T Axis 11/29/2022 35  degrees Final    Diagnosis 11/29/2022 Sinus bradycardia   Final                 Patient Active Problem List   Diagnosis    Age-related osteoporosis without current pathological fracture    Major depressive disorder, recurrent, mild (HCC)    Lipedema    Lymphedema of both lower extremities    Pain in both lower extremities    Spinal stenosis of lumbar region without neurogenic claudication    Situational anxiety    Acute pain of left shoulder    Home help needed    Degenerative arthritis of left knee         Assessment:   1. OA left knee      Plan:    1. Proceed with scheduled left knee replacement         All material risks, benefits, and reasonable alternatives including postponing the procedure were discussed. The patient does wish to proceed with the procedure at this time.

## 2022-12-12 ENCOUNTER — ANESTHESIA EVENT (OUTPATIENT)
Dept: SURGERY | Age: 72
End: 2022-12-12
Payer: MEDICARE

## 2022-12-12 ENCOUNTER — ANESTHESIA (OUTPATIENT)
Dept: SURGERY | Age: 72
End: 2022-12-12
Payer: MEDICARE

## 2022-12-12 ENCOUNTER — HOSPITAL ENCOUNTER (OUTPATIENT)
Age: 72
Discharge: HOME HEALTH | End: 2022-12-13
Attending: ORTHOPAEDIC SURGERY | Admitting: ORTHOPAEDIC SURGERY
Payer: MEDICARE

## 2022-12-12 DIAGNOSIS — M17.12 PRIMARY OSTEOARTHRITIS OF LEFT KNEE: Primary | ICD-10-CM

## 2022-12-12 PROBLEM — Z96.652 STATUS POST LEFT KNEE REPLACEMENT: Status: ACTIVE | Noted: 2022-12-12

## 2022-12-12 LAB
HCT VFR BLD AUTO: 35.5 % (ref 35.8–46.3)
HGB BLD-MCNC: 11.5 G/DL (ref 11.7–15.4)

## 2022-12-12 PROCEDURE — 6360000002 HC RX W HCPCS: Performed by: ORTHOPAEDIC SURGERY

## 2022-12-12 PROCEDURE — 94761 N-INVAS EAR/PLS OXIMETRY MLT: CPT

## 2022-12-12 PROCEDURE — 94760 N-INVAS EAR/PLS OXIMETRY 1: CPT

## 2022-12-12 PROCEDURE — C1713 ANCHOR/SCREW BN/BN,TIS/BN: HCPCS | Performed by: ORTHOPAEDIC SURGERY

## 2022-12-12 PROCEDURE — 7100000001 HC PACU RECOVERY - ADDTL 15 MIN: Performed by: ORTHOPAEDIC SURGERY

## 2022-12-12 PROCEDURE — 6370000000 HC RX 637 (ALT 250 FOR IP): Performed by: PHYSICIAN ASSISTANT

## 2022-12-12 PROCEDURE — 85014 HEMATOCRIT: CPT

## 2022-12-12 PROCEDURE — 2580000003 HC RX 258: Performed by: STUDENT IN AN ORGANIZED HEALTH CARE EDUCATION/TRAINING PROGRAM

## 2022-12-12 PROCEDURE — 2580000003 HC RX 258: Performed by: ORTHOPAEDIC SURGERY

## 2022-12-12 PROCEDURE — 2720000010 HC SURG SUPPLY STERILE: Performed by: ORTHOPAEDIC SURGERY

## 2022-12-12 PROCEDURE — 6360000002 HC RX W HCPCS: Performed by: STUDENT IN AN ORGANIZED HEALTH CARE EDUCATION/TRAINING PROGRAM

## 2022-12-12 PROCEDURE — 36415 COLL VENOUS BLD VENIPUNCTURE: CPT

## 2022-12-12 PROCEDURE — 6360000002 HC RX W HCPCS: Performed by: PHYSICIAN ASSISTANT

## 2022-12-12 PROCEDURE — C1776 JOINT DEVICE (IMPLANTABLE): HCPCS | Performed by: ORTHOPAEDIC SURGERY

## 2022-12-12 PROCEDURE — 2580000003 HC RX 258: Performed by: PHYSICIAN ASSISTANT

## 2022-12-12 PROCEDURE — 3600000015 HC SURGERY LEVEL 5 ADDTL 15MIN: Performed by: ORTHOPAEDIC SURGERY

## 2022-12-12 PROCEDURE — 64447 NJX AA&/STRD FEMORAL NRV IMG: CPT | Performed by: ANESTHESIOLOGY

## 2022-12-12 PROCEDURE — 3600000005 HC SURGERY LEVEL 5 BASE: Performed by: ORTHOPAEDIC SURGERY

## 2022-12-12 PROCEDURE — 27447 TOTAL KNEE ARTHROPLASTY: CPT | Performed by: ORTHOPAEDIC SURGERY

## 2022-12-12 PROCEDURE — 97161 PT EVAL LOW COMPLEX 20 MIN: CPT

## 2022-12-12 PROCEDURE — 7100000000 HC PACU RECOVERY - FIRST 15 MIN: Performed by: ORTHOPAEDIC SURGERY

## 2022-12-12 PROCEDURE — 6360000002 HC RX W HCPCS: Performed by: ANESTHESIOLOGY

## 2022-12-12 PROCEDURE — 20985 CPTR-ASST DIR MS PX: CPT | Performed by: ORTHOPAEDIC SURGERY

## 2022-12-12 PROCEDURE — 2709999900 HC NON-CHARGEABLE SUPPLY: Performed by: ORTHOPAEDIC SURGERY

## 2022-12-12 PROCEDURE — 2500000003 HC RX 250 WO HCPCS: Performed by: STUDENT IN AN ORGANIZED HEALTH CARE EDUCATION/TRAINING PROGRAM

## 2022-12-12 PROCEDURE — 3700000001 HC ADD 15 MINUTES (ANESTHESIA): Performed by: ORTHOPAEDIC SURGERY

## 2022-12-12 PROCEDURE — 6370000000 HC RX 637 (ALT 250 FOR IP): Performed by: ORTHOPAEDIC SURGERY

## 2022-12-12 PROCEDURE — 97530 THERAPEUTIC ACTIVITIES: CPT

## 2022-12-12 PROCEDURE — 3700000000 HC ANESTHESIA ATTENDED CARE: Performed by: ORTHOPAEDIC SURGERY

## 2022-12-12 PROCEDURE — 97110 THERAPEUTIC EXERCISES: CPT

## 2022-12-12 PROCEDURE — 2500000003 HC RX 250 WO HCPCS: Performed by: ORTHOPAEDIC SURGERY

## 2022-12-12 DEVICE — COMPONENT PART KNEE CAPPED K1 STRYKER: Type: IMPLANTABLE DEVICE | Status: FUNCTIONAL

## 2022-12-12 DEVICE — POSTERIOR STABILIZED FEMORAL
Type: IMPLANTABLE DEVICE | Site: KNEE | Status: FUNCTIONAL
Brand: TRIATHLON

## 2022-12-12 DEVICE — CEMENT BONE 40GM HI VISC PALACOS R: Type: IMPLANTABLE DEVICE | Site: KNEE | Status: FUNCTIONAL

## 2022-12-12 DEVICE — CEMENTED STEM
Type: IMPLANTABLE DEVICE | Site: KNEE | Status: FUNCTIONAL
Brand: TRIATHLON

## 2022-12-12 DEVICE — FEMORAL DISTAL FIXATION PEG
Type: IMPLANTABLE DEVICE | Site: KNEE | Status: FUNCTIONAL
Brand: TRIATHLON

## 2022-12-12 DEVICE — UNIVERSAL TIBIAL BASEPLATE
Type: IMPLANTABLE DEVICE | Site: KNEE | Status: FUNCTIONAL
Brand: TRIATHLON

## 2022-12-12 DEVICE — PATELLA
Type: IMPLANTABLE DEVICE | Site: PATELLA | Status: FUNCTIONAL
Brand: TRIATHLON

## 2022-12-12 DEVICE — INSERT TIB PS 6 10 MM ARTC KNEE BEAR TECHNOLOGY X3 TRIATHLON: Type: IMPLANTABLE DEVICE | Site: KNEE | Status: FUNCTIONAL

## 2022-12-12 RX ORDER — FAMOTIDINE 20 MG/1
20 TABLET, FILM COATED ORAL ONCE
Status: COMPLETED | OUTPATIENT
Start: 2022-12-12 | End: 2022-12-12

## 2022-12-12 RX ORDER — SODIUM CHLORIDE 9 MG/ML
INJECTION, SOLUTION INTRAVENOUS PRN
Status: DISCONTINUED | OUTPATIENT
Start: 2022-12-12 | End: 2022-12-13 | Stop reason: HOSPADM

## 2022-12-12 RX ORDER — VANCOMYCIN HYDROCHLORIDE 1 G/20ML
INJECTION, POWDER, LYOPHILIZED, FOR SOLUTION INTRAVENOUS PRN
Status: DISCONTINUED | OUTPATIENT
Start: 2022-12-12 | End: 2022-12-12 | Stop reason: HOSPADM

## 2022-12-12 RX ORDER — HYDROMORPHONE HYDROCHLORIDE 1 MG/ML
0.5 INJECTION, SOLUTION INTRAMUSCULAR; INTRAVENOUS; SUBCUTANEOUS EVERY 5 MIN PRN
Status: DISCONTINUED | OUTPATIENT
Start: 2022-12-12 | End: 2022-12-12 | Stop reason: HOSPADM

## 2022-12-12 RX ORDER — SODIUM CHLORIDE 0.9 % (FLUSH) 0.9 %
5-40 SYRINGE (ML) INJECTION PRN
Status: DISCONTINUED | OUTPATIENT
Start: 2022-12-12 | End: 2022-12-12 | Stop reason: HOSPADM

## 2022-12-12 RX ORDER — SODIUM CHLORIDE 9 MG/ML
INJECTION, SOLUTION INTRAVENOUS PRN
Status: DISCONTINUED | OUTPATIENT
Start: 2022-12-12 | End: 2022-12-12 | Stop reason: HOSPADM

## 2022-12-12 RX ORDER — ACETAMINOPHEN 500 MG
1000 TABLET ORAL ONCE
Status: COMPLETED | OUTPATIENT
Start: 2022-12-12 | End: 2022-12-12

## 2022-12-12 RX ORDER — ASPIRIN 81 MG/1
81 TABLET ORAL 2 TIMES DAILY
Status: DISCONTINUED | OUTPATIENT
Start: 2022-12-12 | End: 2022-12-13 | Stop reason: HOSPADM

## 2022-12-12 RX ORDER — PANTOPRAZOLE SODIUM 40 MG/1
40 TABLET, DELAYED RELEASE ORAL
Status: DISCONTINUED | OUTPATIENT
Start: 2022-12-13 | End: 2022-12-13 | Stop reason: HOSPADM

## 2022-12-12 RX ORDER — PROMETHAZINE HYDROCHLORIDE 25 MG/1
25 TABLET ORAL EVERY 8 HOURS PRN
Qty: 30 TABLET | Refills: 1 | Status: SHIPPED | OUTPATIENT
Start: 2022-12-12

## 2022-12-12 RX ORDER — MIDAZOLAM HYDROCHLORIDE 2 MG/2ML
2 INJECTION, SOLUTION INTRAMUSCULAR; INTRAVENOUS
Status: DISCONTINUED | OUTPATIENT
Start: 2022-12-12 | End: 2022-12-12 | Stop reason: HOSPADM

## 2022-12-12 RX ORDER — SODIUM CHLORIDE 0.9 % (FLUSH) 0.9 %
5-40 SYRINGE (ML) INJECTION EVERY 12 HOURS SCHEDULED
Status: DISCONTINUED | OUTPATIENT
Start: 2022-12-12 | End: 2022-12-12 | Stop reason: HOSPADM

## 2022-12-12 RX ORDER — SODIUM CHLORIDE, SODIUM LACTATE, POTASSIUM CHLORIDE, CALCIUM CHLORIDE 600; 310; 30; 20 MG/100ML; MG/100ML; MG/100ML; MG/100ML
INJECTION, SOLUTION INTRAVENOUS CONTINUOUS
Status: DISCONTINUED | OUTPATIENT
Start: 2022-12-12 | End: 2022-12-13 | Stop reason: HOSPADM

## 2022-12-12 RX ORDER — OXYCODONE HYDROCHLORIDE 5 MG/1
5 TABLET ORAL
Status: DISCONTINUED | OUTPATIENT
Start: 2022-12-12 | End: 2022-12-12 | Stop reason: HOSPADM

## 2022-12-12 RX ORDER — LIDOCAINE HYDROCHLORIDE 10 MG/ML
1 INJECTION, SOLUTION INFILTRATION; PERINEURAL
Status: DISCONTINUED | OUTPATIENT
Start: 2022-12-12 | End: 2022-12-12 | Stop reason: HOSPADM

## 2022-12-12 RX ORDER — EPHEDRINE SULFATE/0.9% NACL/PF 50 MG/5 ML
SYRINGE (ML) INTRAVENOUS PRN
Status: DISCONTINUED | OUTPATIENT
Start: 2022-12-12 | End: 2022-12-12 | Stop reason: SDUPTHER

## 2022-12-12 RX ORDER — ONDANSETRON 2 MG/ML
4 INJECTION INTRAMUSCULAR; INTRAVENOUS EVERY 6 HOURS PRN
Status: DISCONTINUED | OUTPATIENT
Start: 2022-12-12 | End: 2022-12-13 | Stop reason: HOSPADM

## 2022-12-12 RX ORDER — ROPIVACAINE HYDROCHLORIDE 2 MG/ML
INJECTION, SOLUTION EPIDURAL; INFILTRATION; PERINEURAL PRN
Status: DISCONTINUED | OUTPATIENT
Start: 2022-12-12 | End: 2022-12-12 | Stop reason: HOSPADM

## 2022-12-12 RX ORDER — HYDROXYZINE HYDROCHLORIDE 25 MG/1
25 TABLET, FILM COATED ORAL 4 TIMES DAILY PRN
Status: DISCONTINUED | OUTPATIENT
Start: 2022-12-12 | End: 2022-12-13 | Stop reason: HOSPADM

## 2022-12-12 RX ORDER — ONDANSETRON 2 MG/ML
INJECTION INTRAMUSCULAR; INTRAVENOUS PRN
Status: DISCONTINUED | OUTPATIENT
Start: 2022-12-12 | End: 2022-12-12 | Stop reason: SDUPTHER

## 2022-12-12 RX ORDER — KETOROLAC TROMETHAMINE 30 MG/ML
INJECTION, SOLUTION INTRAMUSCULAR; INTRAVENOUS PRN
Status: DISCONTINUED | OUTPATIENT
Start: 2022-12-12 | End: 2022-12-12 | Stop reason: HOSPADM

## 2022-12-12 RX ORDER — FENTANYL CITRATE 50 UG/ML
100 INJECTION, SOLUTION INTRAMUSCULAR; INTRAVENOUS
Status: COMPLETED | OUTPATIENT
Start: 2022-12-12 | End: 2022-12-12

## 2022-12-12 RX ORDER — ASPIRIN 81 MG/1
81 TABLET ORAL EVERY 12 HOURS
Qty: 70 TABLET | Refills: 0 | Status: SHIPPED | OUTPATIENT
Start: 2022-12-12 | End: 2023-01-16

## 2022-12-12 RX ORDER — ROPIVACAINE HYDROCHLORIDE 2 MG/ML
INJECTION, SOLUTION EPIDURAL; INFILTRATION; PERINEURAL
Status: COMPLETED | OUTPATIENT
Start: 2022-12-12 | End: 2022-12-12

## 2022-12-12 RX ORDER — OXYBUTYNIN CHLORIDE 10 MG/1
10 TABLET, EXTENDED RELEASE ORAL NIGHTLY
Status: DISCONTINUED | OUTPATIENT
Start: 2022-12-12 | End: 2022-12-13 | Stop reason: HOSPADM

## 2022-12-12 RX ORDER — ACETAMINOPHEN 500 MG
1000 TABLET ORAL EVERY 6 HOURS
Status: DISCONTINUED | OUTPATIENT
Start: 2022-12-12 | End: 2022-12-13 | Stop reason: HOSPADM

## 2022-12-12 RX ORDER — PROPOFOL 10 MG/ML
INJECTION, EMULSION INTRAVENOUS CONTINUOUS PRN
Status: DISCONTINUED | OUTPATIENT
Start: 2022-12-12 | End: 2022-12-12 | Stop reason: SDUPTHER

## 2022-12-12 RX ORDER — LEVOTHYROXINE SODIUM 0.07 MG/1
75 TABLET ORAL
Status: DISCONTINUED | OUTPATIENT
Start: 2022-12-13 | End: 2022-12-13 | Stop reason: HOSPADM

## 2022-12-12 RX ORDER — DEXAMETHASONE SODIUM PHOSPHATE 10 MG/ML
INJECTION INTRAMUSCULAR; INTRAVENOUS PRN
Status: DISCONTINUED | OUTPATIENT
Start: 2022-12-12 | End: 2022-12-12 | Stop reason: SDUPTHER

## 2022-12-12 RX ORDER — SODIUM CHLORIDE, SODIUM LACTATE, POTASSIUM CHLORIDE, CALCIUM CHLORIDE 600; 310; 30; 20 MG/100ML; MG/100ML; MG/100ML; MG/100ML
INJECTION, SOLUTION INTRAVENOUS CONTINUOUS PRN
Status: DISCONTINUED | OUTPATIENT
Start: 2022-12-12 | End: 2022-12-12 | Stop reason: SDUPTHER

## 2022-12-12 RX ORDER — CELECOXIB 200 MG/1
200 CAPSULE ORAL DAILY PRN
Qty: 60 CAPSULE | Refills: 2 | Status: SHIPPED | OUTPATIENT
Start: 2022-12-12

## 2022-12-12 RX ORDER — ONDANSETRON 4 MG/1
4 TABLET, ORALLY DISINTEGRATING ORAL EVERY 8 HOURS PRN
Status: DISCONTINUED | OUTPATIENT
Start: 2022-12-12 | End: 2022-12-13 | Stop reason: HOSPADM

## 2022-12-12 RX ORDER — DIPHENHYDRAMINE HYDROCHLORIDE 50 MG/ML
12.5 INJECTION INTRAMUSCULAR; INTRAVENOUS
Status: DISCONTINUED | OUTPATIENT
Start: 2022-12-12 | End: 2022-12-12 | Stop reason: HOSPADM

## 2022-12-12 RX ORDER — SODIUM CHLORIDE 0.9 % (FLUSH) 0.9 %
5-40 SYRINGE (ML) INJECTION PRN
Status: CANCELLED | OUTPATIENT
Start: 2022-12-12

## 2022-12-12 RX ORDER — CELECOXIB 200 MG/1
200 CAPSULE ORAL ONCE
Status: COMPLETED | OUTPATIENT
Start: 2022-12-12 | End: 2022-12-12

## 2022-12-12 RX ORDER — MIDAZOLAM HYDROCHLORIDE 2 MG/2ML
2 INJECTION, SOLUTION INTRAMUSCULAR; INTRAVENOUS
Status: COMPLETED | OUTPATIENT
Start: 2022-12-12 | End: 2022-12-12

## 2022-12-12 RX ORDER — DIPHENHYDRAMINE HCL 25 MG
25 CAPSULE ORAL NIGHTLY PRN
Status: DISCONTINUED | OUTPATIENT
Start: 2022-12-12 | End: 2022-12-13 | Stop reason: HOSPADM

## 2022-12-12 RX ORDER — CELECOXIB 200 MG/1
200 CAPSULE ORAL ONCE
Status: DISCONTINUED | OUTPATIENT
Start: 2022-12-12 | End: 2022-12-12 | Stop reason: HOSPADM

## 2022-12-12 RX ORDER — CYCLOBENZAPRINE HCL 5 MG
5 TABLET ORAL 2 TIMES DAILY PRN
Qty: 30 TABLET | Refills: 1 | Status: SHIPPED | OUTPATIENT
Start: 2022-12-12

## 2022-12-12 RX ORDER — MIDAZOLAM HYDROCHLORIDE 1 MG/ML
INJECTION INTRAMUSCULAR; INTRAVENOUS PRN
Status: DISCONTINUED | OUTPATIENT
Start: 2022-12-12 | End: 2022-12-12 | Stop reason: SDUPTHER

## 2022-12-12 RX ORDER — FENTANYL CITRATE 50 UG/ML
INJECTION, SOLUTION INTRAMUSCULAR; INTRAVENOUS PRN
Status: DISCONTINUED | OUTPATIENT
Start: 2022-12-12 | End: 2022-12-12 | Stop reason: SDUPTHER

## 2022-12-12 RX ORDER — SENNA AND DOCUSATE SODIUM 50; 8.6 MG/1; MG/1
1 TABLET, FILM COATED ORAL 2 TIMES DAILY
Status: DISCONTINUED | OUTPATIENT
Start: 2022-12-12 | End: 2022-12-13 | Stop reason: HOSPADM

## 2022-12-12 RX ORDER — HYDROMORPHONE HYDROCHLORIDE 1 MG/ML
1 INJECTION, SOLUTION INTRAMUSCULAR; INTRAVENOUS; SUBCUTANEOUS
Status: DISCONTINUED | OUTPATIENT
Start: 2022-12-12 | End: 2022-12-13 | Stop reason: HOSPADM

## 2022-12-12 RX ORDER — CETIRIZINE HYDROCHLORIDE 10 MG/1
10 TABLET ORAL
Status: DISCONTINUED | OUTPATIENT
Start: 2022-12-12 | End: 2022-12-13 | Stop reason: HOSPADM

## 2022-12-12 RX ORDER — TRANEXAMIC ACID 100 MG/ML
INJECTION, SOLUTION INTRAVENOUS PRN
Status: DISCONTINUED | OUTPATIENT
Start: 2022-12-12 | End: 2022-12-12 | Stop reason: SDUPTHER

## 2022-12-12 RX ORDER — PROCHLORPERAZINE EDISYLATE 5 MG/ML
5 INJECTION INTRAMUSCULAR; INTRAVENOUS
Status: DISCONTINUED | OUTPATIENT
Start: 2022-12-12 | End: 2022-12-12 | Stop reason: HOSPADM

## 2022-12-12 RX ORDER — ACETAMINOPHEN 500 MG
1000 TABLET ORAL ONCE
Status: DISCONTINUED | OUTPATIENT
Start: 2022-12-12 | End: 2022-12-12 | Stop reason: HOSPADM

## 2022-12-12 RX ORDER — SODIUM CHLORIDE 0.9 % (FLUSH) 0.9 %
5-40 SYRINGE (ML) INJECTION EVERY 12 HOURS SCHEDULED
Status: CANCELLED | OUTPATIENT
Start: 2022-12-12

## 2022-12-12 RX ORDER — FAMOTIDINE 20 MG/1
20 TABLET, FILM COATED ORAL ONCE
Status: DISCONTINUED | OUTPATIENT
Start: 2022-12-12 | End: 2022-12-12 | Stop reason: HOSPADM

## 2022-12-12 RX ORDER — FENTANYL CITRATE 50 UG/ML
100 INJECTION, SOLUTION INTRAMUSCULAR; INTRAVENOUS
Status: DISCONTINUED | OUTPATIENT
Start: 2022-12-12 | End: 2022-12-12 | Stop reason: HOSPADM

## 2022-12-12 RX ORDER — ACETAMINOPHEN 500 MG
1000 TABLET ORAL ONCE
Status: DISCONTINUED | OUTPATIENT
Start: 2022-12-12 | End: 2022-12-12

## 2022-12-12 RX ORDER — OXYCODONE HYDROCHLORIDE 5 MG/1
10 TABLET ORAL EVERY 4 HOURS PRN
Status: DISCONTINUED | OUTPATIENT
Start: 2022-12-12 | End: 2022-12-13 | Stop reason: HOSPADM

## 2022-12-12 RX ORDER — OXYCODONE HYDROCHLORIDE 5 MG/1
5-10 TABLET ORAL EVERY 4 HOURS PRN
Qty: 60 TABLET | Refills: 0 | Status: SHIPPED | OUTPATIENT
Start: 2022-12-12 | End: 2022-12-17

## 2022-12-12 RX ADMIN — MEPIVACAINE HYDROCHLORIDE 60 MG: 20 INJECTION, SOLUTION EPIDURAL; INFILTRATION at 12:14

## 2022-12-12 RX ADMIN — ACETAMINOPHEN 1000 MG: 500 TABLET, FILM COATED ORAL at 17:09

## 2022-12-12 RX ADMIN — CEFAZOLIN SODIUM 2000 MG: 100 INJECTION, POWDER, LYOPHILIZED, FOR SOLUTION INTRAVENOUS at 21:08

## 2022-12-12 RX ADMIN — TRANEXAMIC ACID 1000 MG: 100 INJECTION, SOLUTION INTRAVENOUS at 12:21

## 2022-12-12 RX ADMIN — FENTANYL CITRATE 100 MCG: 50 INJECTION, SOLUTION INTRAMUSCULAR; INTRAVENOUS at 11:49

## 2022-12-12 RX ADMIN — FAMOTIDINE 20 MG: 20 TABLET, FILM COATED ORAL at 10:13

## 2022-12-12 RX ADMIN — Medication 10 MG: at 13:23

## 2022-12-12 RX ADMIN — PROPOFOL 100 MCG/KG/MIN: 10 INJECTION, EMULSION INTRAVENOUS at 12:20

## 2022-12-12 RX ADMIN — SODIUM CHLORIDE, SODIUM LACTATE, POTASSIUM CHLORIDE, AND CALCIUM CHLORIDE: 600; 310; 30; 20 INJECTION, SOLUTION INTRAVENOUS at 12:04

## 2022-12-12 RX ADMIN — DEXAMETHASONE SODIUM PHOSPHATE 10 MG: 10 INJECTION INTRAMUSCULAR; INTRAVENOUS at 12:19

## 2022-12-12 RX ADMIN — ASPIRIN 81 MG: 81 TABLET, COATED ORAL at 21:05

## 2022-12-12 RX ADMIN — ONDANSETRON 4 MG: 2 INJECTION INTRAMUSCULAR; INTRAVENOUS at 12:19

## 2022-12-12 RX ADMIN — OXYBUTYNIN CHLORIDE 10 MG: 10 TABLET, EXTENDED RELEASE ORAL at 21:06

## 2022-12-12 RX ADMIN — MIDAZOLAM 2 MG: 1 INJECTION INTRAMUSCULAR; INTRAVENOUS at 12:12

## 2022-12-12 RX ADMIN — Medication 15 MG: at 12:48

## 2022-12-12 RX ADMIN — OXYCODONE 10 MG: 5 TABLET ORAL at 21:06

## 2022-12-12 RX ADMIN — CELECOXIB 200 MG: 200 CAPSULE ORAL at 10:13

## 2022-12-12 RX ADMIN — ROPIVACAINE HYDROCHLORIDE 20 ML: 2 INJECTION, SOLUTION EPIDURAL; INFILTRATION at 11:49

## 2022-12-12 RX ADMIN — SENNOSIDES AND DOCUSATE SODIUM 1 TABLET: 50; 8.6 TABLET ORAL at 21:06

## 2022-12-12 RX ADMIN — VANCOMYCIN HYDROCHLORIDE 1750 MG: 10 INJECTION, POWDER, LYOPHILIZED, FOR SOLUTION INTRAVENOUS at 11:56

## 2022-12-12 RX ADMIN — FENTANYL CITRATE 100 MCG: 50 INJECTION, SOLUTION INTRAMUSCULAR; INTRAVENOUS at 12:12

## 2022-12-12 RX ADMIN — Medication 2 G: at 12:16

## 2022-12-12 RX ADMIN — SODIUM CHLORIDE, SODIUM LACTATE, POTASSIUM CHLORIDE, AND CALCIUM CHLORIDE: 600; 310; 30; 20 INJECTION, SOLUTION INTRAVENOUS at 13:24

## 2022-12-12 RX ADMIN — OXYCODONE 10 MG: 5 TABLET ORAL at 17:09

## 2022-12-12 RX ADMIN — ACETAMINOPHEN 1000 MG: 500 TABLET, FILM COATED ORAL at 10:12

## 2022-12-12 RX ADMIN — CETIRIZINE HYDROCHLORIDE 10 MG: 10 TABLET, FILM COATED ORAL at 21:06

## 2022-12-12 RX ADMIN — MIDAZOLAM 2 MG: 1 INJECTION INTRAMUSCULAR; INTRAVENOUS at 11:49

## 2022-12-12 ASSESSMENT — PAIN DESCRIPTION - DESCRIPTORS
DESCRIPTORS: ACHING
DESCRIPTORS: ACHING

## 2022-12-12 ASSESSMENT — PAIN SCALES - GENERAL
PAINLEVEL_OUTOF10: 6
PAINLEVEL_OUTOF10: 3
PAINLEVEL_OUTOF10: 3

## 2022-12-12 ASSESSMENT — PAIN DESCRIPTION - LOCATION: LOCATION: KNEE

## 2022-12-12 ASSESSMENT — PAIN DESCRIPTION - ORIENTATION: ORIENTATION: LEFT

## 2022-12-12 ASSESSMENT — PAIN - FUNCTIONAL ASSESSMENT: PAIN_FUNCTIONAL_ASSESSMENT: 0-10

## 2022-12-12 NOTE — OP NOTE
[address] [place] [address] Robotic Assisted Total Knee Arthroplasty:  Posterior Stabilized Component       Patient:Vanessa Song   : 1950  Medical Record Number:[id number]  Pre-operative Diagnosis:  Degenerative arthritis of left knee [M17.12]  Post-operative Diagnosis: Degenerative arthritis of left knee [M17.12]  Location: [address] [place] [address]  Surgeon: Anna Chavez MD   Assistant: Jeniffer ramires    Anesthesia: Spinal and ACB    Indications: Patient has end stage arthritis. They have tried and failed conservative management. Procedure:Procedure(s) (LRB):  LT KNEE TOTAL ARTHROPLASTY ROBOTIC (Left)   CPT- 41418- Total knee arthroplasty           56335- Other procedures on musculoskeletal system            0055T- Computer assisted surgical navigation   The complexity of the total joint surgery requires the use of a first assistant for positioning, retraction and expertise in closure. Tourniquet Time: 0 minutes  EBL: 250 cc  Findings: severe degenerative arthritis, patellar osteophytes with loss of patella cartilage, posterior femoral osteophytes     BMI: Body mass index is 40.38 kg/m². Ratna Watson was brought to the operating room and positioned on the operating table. She was anesthestized with anesthesia. IV antibiotics were administered. Prior to the incision being made a timeout was called identifying the patient, procedure ,operative side and surgeon The operative leg was prepped and draped in the usual sterile manner. An anterior longitudinal incision was accomplished just medial to the tibial tubercle and extending approximal 6 centimeters proximal to the superior pole of the patella. A medial parapatellar capsular incision was performed. The medial capsular flap was elevated around to the insertion of the semimembranous tendon. The patella was everted and the knee flexed and externally rotated. The medial and external menisci were excised.   The lateral half of the fat pad excised and the patella femoral ligament was released. The anterior cruciate ligament and the posterior cruciate ligament were resected. The femoral and tibial arrays were pinned in place and registered with the Samplesaint 92. The patient landmarks were collected and the tibial and femoral checkpoints were registered and verified. The preresection balancing was performed. LCL release and IT band release were required to balance knee. The distal femur was addressed first. Utilizing the [facility] robotic arm the distal femoral cut was made. The anterior and posterior cuts were then made. The osteophytes were removed from the tibial and femoral surfaces. The tibia was then addressed. The Clickable robotic arm was then used to make the measured resection of the tibia. The tibia was sized. The tibial base plate was pinned into place with the appropriate external rotation and stem site prepared. The notch cutting block was placed and pinned. The notch cut was accomplished and the pins and block were removed. A trial femoral component and poly was placed. A preliminary range of motion was accomplished with the trial components. The patient was found to obtain full extension as well as appropriate flexion. The patient's ligaments were stable in flexion and extension to medial and lateral stressing and the alignment was through the appropriate mechanical axis. Additional surgical procedures included: none. The patella was then everted. The bone was resect to accommodate the three peg patellar button. A trial reduction of the patella revealed appropriate tracking through the patellofemoral groove with no lateral retinacular release being accomplished. All trial components were removed. The real implants were opened: Sizes listed below. The knee was irrigated. There were no femoral deficiencies. There were no tibial deficiencies. No augmentation was utilized.  The tibial and femoral components were cemented into place. The patella component was cemented into place. René Gilbert knee was placed through range of motion and noted to be stable as mentioned above with the trail components. The wound was dry, therefore no drain was used. The operative knee was injected with 60 cc of Naropin, 10 cc's of morphine and 1 cc of 30 mg of Toradol. The knee was then soaked with a diluted betadine solution for approximately 3 min. This was then thoroughly irrigated. The capsular layer was closed using a #1 PDS suture. Then, 1 gram (100 mg/ml) of Transexamic Acid was injected into the joint space. The subcutaneous layers were closed using 2-0 Stratafix. Finally the skin was closed using 3-0 Vicryl and staples which were applied in occlusive fashion and sterile bandage applied. An Iceman cryo pad was applied on the operative leg. Sponge count and needle counts were correct. René Gilbert left the operating room     Implants:   Implant Name Type Inv.  Item Serial No.  Lot No. LRB No. Used Action   CEMENT BONE 40GM HI VISC PALACOS R - [id number]  CEMENT BONE 40GM HI VISC PALACOS R [id number] [facility] [id number] Left 2 Implanted   [employer] Asymmetric Patella   [id number]  [id number] Left 1 Implanted   COMPONENT FEM SZ 5 L KNEE POST STBL KENYATTA [employer]ATHLON - [id number]  COMPONENT FEM SZ 5 L KNEE POST STBL KENYATTA TRIATHLON [id number] [employer] [facility] [id number] Left 1 Implanted   BASEPLATE TIB SZ 6 UNIV KNEE TRITANIUM TOT STBL KENYATTA - [id number]  BASEPLATE TIB SZ 6 UNIV KNEE TRITANIUM TOT STBL KENYATTA [id number] [employer] [facility] [id number] Left 1 Implanted   PEG BNE FIX DST FEM MOD TRIATHLON - [id number]  PEG BNE FIX DST FEM MOD TRIATHLON [id number] [employer] [facility] [id number] Left 1 Implanted   STEM TIB L50MM [id number] KNEE TOT STBL KENYATTA END CAP TRIATHLON - [id number]  STEM TIB L50MM [id number] KNEE TOT STBL KENYATTA END CAP TRIATHLON [id number] [employer] [facility] [id number] Left 1 Implanted INSERT TIB PS 6 10 MM ARTC KNEE [employer] X3 TRIATHLON - [id number]  INSERT TIB PS 6 10 MM ARTC KNEE BEAR TECHNOLOGY X3 TRIATHLON DONAOBRANDIK [employer] [facility] [address] Left 1 Implanted         Signed By: Catrachito Hoang MD   12/12/2022,  2:06 PM

## 2022-12-12 NOTE — ANESTHESIA PROCEDURE NOTES
Peripheral Block    Patient location during procedure: pre-op  Reason for block: post-op pain management and at surgeon's request  Start time: 12/12/2022 11:49 AM  End time: 12/12/2022 11:52 AM  Staffing  Performed: anesthesiologist   Anesthesiologist: Anastasiia Camacho MD  Preanesthetic Checklist  Completed: patient identified, IV checked, site marked, risks and benefits discussed, surgical/procedural consents, equipment checked, pre-op evaluation, timeout performed, anesthesia consent given, oxygen available and monitors applied/VS acknowledged  Peripheral Block   Patient position: supine  Prep: ChloraPrep  Provider prep: mask and sterile gloves  Patient monitoring: cardiac monitor, continuous pulse ox, frequent blood pressure checks, IV access, oxygen and responsive to questions  Block type: Femoral  Adductor canal  Laterality: left  Injection technique: single-shot  Guidance: ultrasound guided    Needle   Needle type: insulated echogenic nerve stimulator needle   Needle gauge: 20 G  Needle localization: ultrasound guidance  Needle length: 10 cm  Assessment   Injection assessment: negative aspiration for heme, no paresthesia on injection, no intravascular symptoms and local visualized surrounding nerve on ultrasound  Slow fractionated injection: yes  Hemodynamics: stable  Real-time US image taken/store: yes  Outcomes: patient tolerated procedure well and uncomplicated    Additional Notes  3 cc 1% lidocaine local at needle insertion site. Risks discussed including damage to muscle or nerve. Needle inserted and placed in close proximity to the nerve under real time ultrasound guidance. Ultrasound was used to visualize the spread of local anesthetic in close proximity to the nerve being blocked. All structures appeared anatomically normal and there were no abnormal findings. Ultrasound imaged printed and placed on chart.     Medications Administered  ropivacaine (NAROPIN) injection 0.2% - Perineural   20 mL - 12/12/2022 11:49:00 AM

## 2022-12-12 NOTE — ANESTHESIA PROCEDURE NOTES
Spinal Block    Patient location during procedure: OR  Reason for block: primary anesthetic  Staffing  Performed: anesthesiologist   Anesthesiologist: Jessika Grijalva MD  Spinal Block  Patient position: sitting  Prep: ChloraPrep  Patient monitoring: cardiac monitor, continuous pulse ox and frequent blood pressure checks  Approach: midline  Location: L3/L4  Provider prep: mask and sterile gloves  Needle  Needle type: Quincke   Needle gauge: 25 G  Needle length: 3.5 in  Assessment  Events: SAB placement uncomplicated. No paresthesia. Swirl obtained: Yes  CSF: clear  Attempts: 1  Hemodynamics: stable  Additional Notes  3 cc 1% lidocaine local injected at needle insertion site. Risks discussed including damage to muscle or nerve.   Preanesthetic Checklist  Completed: patient identified, IV checked, risks and benefits discussed, equipment checked, pre-op evaluation, timeout performed, anesthesia consent given, oxygen available and monitors applied/VS acknowledged

## 2022-12-12 NOTE — PROGRESS NOTES
ACUTE PHYSICAL THERAPY GOALS:   (Developed with and agreed upon by patient and/or caregiver.)  GOALS (1-4 days):  (1.)Ms. Song will move from supine to sit and sit to supine  in bed with MODIFIED INDEPENDENCE. (2.)Ms. Song will transfer from bed to chair and chair to bed with MODIFIED INDEPENDENCE using the least restrictive device. (3.)Ms. Song will ambulate with MODIFIED INDEPENDENCE for 250 feet with the least restrictive device. (4.)Ms. Song will ambulate up/down 2 steps with left railing with MODIFIED INDEPENDENCE using no assistive device. (5.)Ms. Song will increase left knee ROM to 0-115°.       PHYSICAL THERAPY JOINT CAMP: TOTAL KNEE ARTHROPLASTY Initial Assessment, Daily Note, and PM  (Link to Caseload Tracking: PT Visit Days : 1  Acknowledge Orders  Time In/Out  PT Charge Capture  Rehab Caseload Tracker  Episode   Trey Seay is a 81 y.o. female   PRIMARY DIAGNOSIS: Status post left knee replacement  Degenerative arthritis of left knee [M17.12]  Primary osteoarthritis of left knee [M17.12]  Procedure(s) (LRB):  LT KNEE TOTAL ARTHROPLASTY ROBOTIC (Left)  Day of Surgery  Reason for Referral: Pain in Left Knee (M25.562)  Stiffness of Left Knee, Not elsewhere classified (M25.662)  Difficulty in walking, Not elsewhere classified (R26.2)  Outpatient in a bed: Payor: MEDICARE / Plan: MEDICARE PART A AND B / Product Type: *No Product type* /     REHAB RECOMMENDATIONS:   Recommendation to date pending progress:  Setting:  Home Health Therapy    Equipment:    3 in 1 Bedside Commode     RANGE OF MOTION:   Left Knee Flexion: L Knee Flexion (0-145): 85  Left Knee Extension: L Knee Extension (0): 4     GAIT: I Mod I S SBA CGA Min Mod Max Total  NT x2 Comments:   Level of Assistance [] [] [] [] [x] [x] [] [] [] [] []            Weightbearing Status  Right Lower Extremity Weight Bearing: Weight Bearing As Tolerated  Left Lower Extremity Weight Bearing: Weight Bearing As Tolerated    Distance  2 x 30 feet    Gait Quality Antalgic    DME Rolling Walker     Stairs      Ramp     I=Independent, Mod I=Modified Independent, S=Supervision, SBA=Standby Assistance, CGA=Contact Guard Assistance,   Min=Minimal Assistance, Mod=Moderate Assistance, Max=Maximal Assistance, Total=Total Assistance, NT=Not Tested    ASSESSMENT:   ASSESSMENT:  Ms. Song is a 81 y.o. female POD #0 s/p L TKA. Upon PT evaluation, pt exhibits expected post-operative strength, balance, and ROM deficits resulting in limited independence with functional mobility. At baseline, pt was mod I for mobility with RW. Pt is now requiring CGA-min A for transfers and ambulation in room with RW. Pt will require HHPT and BSC at discharge. Pt will continue to benefit from skilled PT to address above impairments and maximize functional independence prior to discharge.   .     Outcome Measure:   KOOS-JR:       SUBJECTIVE:   Ms. Song states, \"I need to go to the bathroom\"     Home Environment/Prior Level of Function Lives With: Spouse  Type of Home: House  Home Layout: One level  Home Access: Stairs to enter without rails  Entrance Stairs - Number of Steps: 3  Bathroom Shower/Tub: Walk-in shower  Bathroom Toilet: Handicap height  Bathroom Equipment: Grab bars in shower, Shower chair, Grab bars around toilet  Home Equipment: Neo Dakins, rolling  ADL Assistance: Independent  Homemaking Assistance: Independent  Ambulation Assistance: Independent  Transfer Assistance: Independent    OBJECTIVE:     PAIN: VITAL SIGNS: LINES/DRAINS:   Pre Treatment:  Pain Assessment: 0-10  Pain Level: 6      Post Treatment: 6 Vitals        Oxygen  O2 Therapy: Room air IV    RESTRICTIONS/PRECAUTIONS:  Restrictions/Precautions: Weight Bearing, Fall Risk  Right Lower Extremity Weight Bearing: Weight Bearing As Tolerated  Left Lower Extremity Weight Bearing: Weight Bearing As Tolerated        Restrictions/Precautions: Weight Bearing, Fall Risk        LOWER EXTREMITY GROSS EVALUATION:  RIGHT LE   Within Functional Limits   Abnormal   Comments   Strength [x] []     Range of Motion [x] []        LEFT LE Within Functional Limits   Abnormal   Comments   Strength [] [x]  L knee 3/5   Range of Motion [] [] AROM LLE (degrees)  L Knee Flexion (0-145): 85  L Knee Extension (0): 4     UPPER EXTREMITY GROSS EVALUATION:     Within Functional Limits   Abnormal   Comments   Strength [x] []    Range of Motion [x] []      SENSATION  Sensation [x]       COGNITION/  PERCEPTION: Intact Impaired (Comments):   Orientation [x]     Vision [x]     Hearing [x]     Cognition  [x]       MOBILITY: I Mod I S SBA CGA Min Mod Max Total  NT x2 Comments:   Bed Mobility    Rolling [] [] [] [x] [] [] [] [] [] [] []    Supine to Sit [] [] [] [x] [] [] [] [] [] [] []    Scooting [] [] [] [x] [] [] [] [] [] [] []    Sit to Supine [] [] [] [] [] [] [] [] [] [x] []    Transfers    Sit to Stand [] [] [] [] [x] [x] [] [] [] [] []    Bed to Chair [] [] [] [] [x] [] [] [] [] [] []    Stand to Sit [] [] [] [] [x] [] [] [] [] [] []    Stand Pivot [] [] [] [] [x] [] [] [] [] [] []    Toilet [] [] [] [] [x] [] [] [] [] [] []     [] [] [] [] [] [] [] [] [] [] []    I=Independent, Mod I=Modified Independent, S=Supervision, SBA=Standby Assistance, CGA=Contact Guard Assistance,   Min=Minimal Assistance, Mod=Moderate Assistance, Max=Maximal Assistance, Total=Total Assistance, NT=Not Tested    BALANCE: Good Fair+ Fair Fair- Poor NT Comments   Sitting Static [x] [] [] [] [] []    Sitting Dynamic [x] [] [] [] [] []              Standing Static [] [x] [] [] [] []    Standing Dynamic [] [] [] [] [] []      PLAN:   FREQUENCY AND DURATION: Daily for duration of hospital stay or until stated goals are met, whichever comes first.    THERAPY PROGNOSIS: Good    PROBLEM LIST:   (Skilled intervention is medically necessary to address:)  Decreased ADL/Functional Activities, Decreased Activity Tolerance, Decreased AROM/PROM, Decreased Gait Ability, Decreased Strength, Decreased Transfer Abilities, and Increased Pain   INTERVENTIONS PLANNED:   (Benefits and precautions of physical therapy have been discussed with the patient.)  Therapeutic Activity, Therapeutic Exercise/HEP, Gait Training, Modalities, and Education       TREATMENT:   EVALUATION: LOW COMPLEXITY: (Untimed Charge)    TREATMENT:   Therapeutic Activity (15 Minutes): Therapeutic activity included Rolling, Supine to Sit, Scooting, Lateral Scooting, Transfer Training, Ambulation on level ground, Sitting balance , Standing balance, and education to improve functional Activity tolerance, Balance, Coordination, Mobility, Strength, and ROM. Therapeutic Exercise (8 Minutes): Therapeutic exercises noted below to improve functional activity tolerance, AROM, strength, and mobility.      TREATMENT GRID:  THERAPEUTIC  EXERCISES: DATE:  12/12 DATE:   DATE:      AM PM AM PM AM PM    [] [x] [] [] [] []   Ankle Pumps  10       Quad Sets  10       Gluteal Sets  10       Hip Abd/ADduction  10       Straight Leg Raises         Knee Slides  10       Short Arc Quads  10       Chair Slides  10 - AA                         B = bilateral; AA = active assistive; A = active; P = passive      EDUCATION: Education Given To: Patient, Family  Education Provided: Role of Therapy, Plan of Care, Precautions, Home Exercise Program, Fall Prevention Strategies, Transfer Training  Education Method: Demonstration, Verbal  Barriers to Learning: None  Education Outcome: Verbalized understanding, Demonstrated understanding  EDUCATION:  [x] Home Exercises  [x] Fall Precautions  [x] No Pillow Under Knee  [] D/C Instruction Review [x] Cryocuff  [x] Walker Management/Safety  [] Adaptive Equipment as Needed     AFTER TREATMENT PRECAUTIONS: Bed/Chair Locked, Call light within reach, Chair, Heels floated, Needs within reach, RN notified, and Visitors at bedside    INTERDISCIPLINARY COLLABORATION:  RN/ PCT and PT/ PTA    COMPLIANCE WITH PROGRAM/EXERCISE: Will assess as treatment progresses. RECOMMENDATIONS/INTENT FOR NEXT TREATMENT SESSION: Treatment next visit will focus on increasing Ms. Pamela Dahl independence with bed mobility, transfers, gait training, strength/ROM exercises, modalities for pain, and patient education.      TIME IN/OUT:  Time In: 1500  Time Out: [address]  Minutes: [address], PT

## 2022-12-12 NOTE — INTERVAL H&P NOTE
Update History & Physical    The patient's History and Physical of December 7, H&P was reviewed with the patient and I examined the patient. There was no change. The surgical site was confirmed by the patient and me. Plan: The risks, benefits, expected outcome, and alternative to the recommended procedure have been discussed with the patient. Patient understands and wants to proceed with the procedure.      Electronically signed by Frederic Lynne MD on 12/12/2022 at 10:59 AM

## 2022-12-12 NOTE — DISCHARGE INSTRUCTIONS
[id number] [facility]   Patient Discharge Instructions    Quincy Lama / [id number] : 1950    Admitted 2022 Discharged: 2022     IF YOU HAVE ANY PROBLEMS ONCE YOU ARE AT HOME CALL THE FOLLOWING NUMBERS:   Nurse's line: [phone]  Main office number: [phone]      Medications    The medications you are to continue on are listed on the medication reconciliation sheet. Narcotic pain medications as well as supplemental iron can cause constipation. If this occurs, try over the counter stool softeners or try stopping the narcotic pain medication and/or the iron. It is important that you take the medication exactly as they are prescribed. Medications which increase your risk of blood clots are listed to stop for 5 weeks after surgery as well as medications or supplements which increase your risk of bleeding complications. Keep your medication in the bottles provided by the pharmacist and keep a list of the medication names, dosages, and times to be taken in your wallet. Do not take other medications without consulting your doctor. If you need a refill on your pain medication, please note our office is closed over the weekend. It is our office policy that on-call providers cannot refill narcotic pain medications over the weekend. If you will need a refill over the weekend, please call our office before 12pm on Friday or first thing Monday morning. Important Information    Do NOT smoke as this will greatly increase your risk of infection! Resume your prehospital diet. If you have excessive nausea or vomitting call your doctor's office     Leg swelling and warmth is normal for 6 months after surgery. If you experience swelling in your leg, elevate your leg while laying down with your toes above your heart. If you have sudden onset severe swelling with leg pain call our office. The stitches deep inside take approximately 6 months to dissolve.  There will be sharp shooting, stinging and burning pain. This is normal and will resolve between 3-6 months after surgery. Difficulty sleeping is normal following total Knee and Hip replacement. You may try melatonin, an over-the-counter sleep aid or benadryl to help with sleep. Most patients will resume sleeping through the night 8 weeks after surgery. Home Physical Therapy is arranged. Home Health will contact you within 48 hrs of discharge that you have chosen. If you have not received a call within this time frame please contact that provider you chose. You should be given this information before you leave the hospital.     You are at a risk for falls. Use the rolling walker when walking. Patients who have had a joint replacement should not drive if they are still taking narcotic pain mediation during the daytime hours. Most patients wean themselves off of pain medication within 2-5 weeks after surgery. You may drive once you discontinue the narcotic pain medication and feel comfortable enough going from gas to break while driving with your right leg. When to Call the office    - If you have a temperature greater then 101 + other symptoms that suggest illness such as nausea/vomiting, altered mental status, extreme fatigue, wound drainage, etc.  - Uncontrolled vomiting   - Loose control of your bladder or bowel function  - Are unable to bear any wieght          Total Knee Replacement: What to Expect at  [address] had a total knee replacement. The doctor replaced the worn ends of the bones that connect to your knee (thighbone and lower leg bone) with plastic and metal parts. When you leave the hospital, you should be able to move around with a walker or crutches. But you will need someone to help you at home until you have more energy and can move around better. You will go home with a bandage and stitches, staples, skin glue, or tape strips. Change the bandage as your doctor tells you to. If you have stitches or staples, your doctor will remove them about 2 weeks after your surgery. Glue or tape strips will fall off on their own over time. You may still have some mild pain, and the area may be swollen for a few months after surgery. Your knee will continue to improve for up to a year. You will probably use a walker for some time after surgery. When you are ready, you can use a cane. You may be able to walk without support after a couple weeks, or when you are comfortable. You will need to do months of physical rehabilitation (rehab) after a knee replacement. Rehab will help you strengthen the muscles of the knee and help you regain movement. After you recover, your artificial knee will allow you to do normal daily activities with less pain or no pain at all. You may be able to hike, dance, or ride a bike. Talk to your doctor about whether you can do more strenuous activities. Always tell your caregivers that you have an artificial knee. How long it will take to walk on your own, return to normal activities, and go back to work depends on your health and how well your rehabilitation (rehab) program goes. The better you do with your rehab exercises, the quicker you will get your strength and movement back. This care sheet gives you a general idea about how long it will take for you to recover. But each person recovers at a different pace. Follow the steps below to get better as quickly as possible. How can you care for yourself at home? Activity    Rest when you feel tired. You may take a nap, but don't stay in bed all day. When you sit, use a chair with arms. You can use the arms to help you stand up. Work with your physical therapist to find the best way to exercise. What you can do as your knee heals will depend on whether your new knee is cemented or uncemented. You may not be able to do certain things for a while if your new knee is uncemented.      After your knee has healed enough, you can do more strenuous activities with caution. You can golf, but you may want to use a golf cart for some time. And don't wear shoes with spikes. You can bike on a flat road or on a stationary bike. Talk to your doctor before biking uphill. Your doctor may suggest that you stay away from activities that put stress on your knee. These include tennis, badminton, contact sports like football, jumping (such as in basketball), jogging, and running. Avoid activities where you might fall. Do not sit for more than 1 hour at a time. Get up and walk around for a while before you sit again. If you must sit for a long time, prop up your leg with a chair or footstool. This will help you avoid swelling. Ask your doctor when you can drive again. It may take several weeks after knee replacement surgery before it's safe for you to drive. When you get into a car, sit on the edge of the seat. Then pull in your legs, and turn to face the front. You should be able to do many everyday activities 3 to 6 weeks after your surgery. You will probably need to take 4 to 16 weeks off from work. When you can go back to work depends on the type of work you do and how you feel. Ask your doctor when it is okay for you to have sex. For 12 weeks, do not lift anything heavier than 10 pounds and do not lift weights. Diet    By the time you leave the hospital, you should be eating your normal diet. If your stomach is upset, try bland, low-fat foods like plain rice, broiled chicken, toast, and yogurt. Your doctor may suggest that you take iron and vitamin supplements. Drink plenty of fluids (unless your doctor tells you not to). Eat healthy foods, and watch your portion sizes. Try to stay at your ideal weight. Too much weight puts more stress on your new knee. You may notice that your bowel movements are not regular right after your surgery. This is common.  Try to avoid constipation and straining with bowel movements. Drinking enough fluids, taking a stool softener, and eating foods that are good sources of fiber can help you avoid constipation. If you have not had a bowel movement after a couple of days, talk to your doctor. Medicines    Your doctor will tell you if and when you can restart your medicines. You will also get instructions about taking any new medicines. If you stopped taking aspirin or some other blood thinner, your doctor will tell you when to start taking it again. Your doctor may give you a blood-thinning medicine to prevent blood clots. If you take a blood thinner, be sure you get instructions about how to take your medicine safely. Blood thinners can cause serious bleeding problems. This medicine could be in pill form or as a shot (injection). If a shot is needed, your doctor will tell you how to do this. Be safe with medicines. Take pain medicines exactly as directed. If the doctor gave you a prescription medicine for pain, take it as prescribed. If you are not taking a prescription pain medicine, ask your doctor if you can take an over-the-counter medicine. Plan to take your pain medicine 30 minutes before exercises. It is easier to prevent pain before it starts than to stop it after it has started. If you think your pain medicine is making you sick to your stomach: Take your medicine after meals (unless your doctor has told you not to). Ask your doctor for a different pain medicine. If your doctor prescribed antibiotics, take them as directed. Do not stop taking them just because you feel better. You need to take the full course of antibiotics. Incision care    If your doctor told you how to care for your cut (incision), follow your doctor's instructions. You will have a dressing over the cut. A dressing helps the incision heal and protects it. Your doctor will tell you how to take care of this. If you did not get instructions, follow this general advice:   If you have strips of tape on the cut the doctor made, leave the tape on for a week or until it falls off. If you have stitches or staples, your doctor will tell you when to come back to have them removed. If you have skin glue on the cut, leave it on until it falls off. Skin glue is also called skin adhesive or liquid stitches. Change the bandage every day. Wash the area daily with warm water, and pat it dry. Don't use hydrogen peroxide or alcohol. They can slow healing. You may cover the area with a gauze bandage if it oozes fluid or rubs against clothing. You may shower 24 to 48 hours after surgery. Pat the incision dry. Don't swim or take a bath for the first 2 weeks, or until your doctor tells you it is okay. Exercise    Your rehab program will give you a number of exercises to do to help you get back your knee's range of motion and strength. Always do them as your therapist tells you. Ice    For pain and swelling, put ice or a cold pack on the area for 10 to 20 minutes at a time. Put a thin cloth between the ice and your skin. If your doctor recommended cold therapy using a portable machine, follow the instructions that came with the machine. Other instructions    Wear compression stockings if your doctor told you to. These may help to prevent blood clots. Your doctor will tell you how long you need to keep wearing the compression stockings. Carry a medical alert card that says you have an artificial joint. You have metal pieces in your knee. These may set off some airport metal detectors. Follow-up care is a key part of your treatment and safety. Be sure to make and go to all appointments, and call your doctor if you are having problems. It's also a good idea to know your test results and keep a list of the medicines you take. When should you call for help? Call 911 anytime you think you may need emergency care. For example, call if:    You passed out (lost consciousness).      You have severe trouble breathing. You have sudden chest pain and shortness of breath, or you cough up blood. Call your doctor now or seek immediate medical care if:    You have signs of infection, such as: Increased pain, swelling, warmth, or redness. Red streaks leading from the incision. Pus draining from the incision. A fever. You have signs of a blood clot, such as:  Pain in your calf, back of the knee, thigh, or groin. Redness and swelling in your leg or groin. Your incision comes open and begins to bleed, or the bleeding increases. You have pain that does not get better after you take pain medicine. Watch closely for changes in your health, and be sure to contact your doctor if:    You do not have a bowel movement after taking a laxative. Where can you learn more? Go to http://www.woods.com/ and enter T054 to learn more about \"Total Knee Replacement: What to Expect at Home. \"  Current as of: March 9, 2022               Content Version: 13.5  © 2006-2022 [employer]. Care instructions adapted under license by [facility] ([facility]). If you have questions about a medical condition or this instruction, always ask your healthcare professional. [place] [address] any warranty or liability for your use of this information. Information obtained by :  I understand that if any problems occur once I am at home I am to contact my physician. I understand and acknowledge receipt of the instructions indicated above.                                                                                                                                            Physician's or R.N.'s Signature                                                                  Date/Time                                                                                                                                              Patient or Representative Signature Date/Time

## 2022-12-12 NOTE — CARE COORDINATION
Patient is a 81y.o. year old female admitted for Left TKA . Patient plans to return home on discharge. Order received to arrange home health. Patient without preference towards agency. Referral sent to [place]. Patient denies any equipment needs as patient has a walker, bedside commode, and raised toilet seat. Will follow until discharge. 12/12/22 1955   Service Assessment   Patient Orientation Alert and Oriented   History Provided By Patient   Primary Caregiver Self   Support Systems Spouse/Significant Other   PCP Verified by CM Yes   Last Visit to PCP Within last year   Prior Functional Level Independent in ADLs/IADLs   Current Functional Level Independent in ADLs/IADLs   Ability to make needs known: Good   Family able to assist with home care needs: Yes   Would you like for me to discuss the discharge plan with any other family members/significant others, and if so, who? No   Services At/After Discharge   Transition of Care Consult (CM Consult) Home Health   Internal Home Health No   Reason Outside Agency Chosen Physician referred to specific agency   Services At/After Discharge Home Health;PT   Condition of Participation: Discharge Planning   The Plan for Transition of Care is related to the following treatment goals: improve mobiltiy   The Patient and/or Patient Representative was provided with a Choice of Provider? Patient   The Patient and/Or Patient Representative agree with the Discharge Plan? Yes   Freedom of Choice list was provided with basic dialogue that supports the patient's individualized plan of care/goals, treatment preferences, and shares the quality data associated with the providers?   Yes

## 2022-12-12 NOTE — PROGRESS NOTES
12/12/22 1615   Oxygen Therapy/Pulse Ox   O2 Device None (Room air)   Heart Rate 71   SpO2 97 %       Patient achieved 2500 Ml/sec on IS. Patient encouraged to do every hour while awake-patient agreed and demonstrated. No shortness of breath or distress noted.    [facility] notes reviewed- continuous sat ordered HS

## 2022-12-12 NOTE — ANESTHESIA PRE PROCEDURE
Department of Anesthesiology  Preprocedure Note       Name:  Ratna Watson   Age:  81 y.o.  :  1950                                          MRN:  [id number]         Date:  2022      Surgeon: Subha Gordon):  Prince Mata MD    Procedure: Procedure(s):  LT KNEE TOTAL ARTHROPLASTY ROBOTIC    Medications prior to admission:   Prior to Admission medications    Medication Sig Start Date End Date Taking? Authorizing Provider   aspirin EC 81 MG EC tablet Take 1 tablet by mouth in the morning and 1 tablet in the evening. 22 Yes FARIDA Sloan   oxyCODONE (ROXICODONE) 5 MG immediate release tablet Take 1-2 tablets by mouth every 4 hours as needed for Pain for up to 5 days. Intended supply: 5 days.  Take lowest dose possible to manage pain 22 Yes FARIDA Sloan   promethazine (PHENERGAN) 25 MG tablet Take 1 tablet by mouth every 8 hours as needed for Nausea 22  Yes FARIDA Sloan   cyclobenzaprine (FLEXERIL) 5 MG tablet Take 1 tablet by mouth 2 times daily as needed for Muscle spasms 22  Yes FARIDA Sloan   hydrOXYzine HCl (ATARAX) 25 MG tablet Take 1 tablet by mouth 4 times daily as needed for Anxiety 22   Mita Antunez MD   cetirizine (ZYRTEC) 10 MG tablet Take 10 mg by mouth nightly    Historical Provider, MD   Glycerin-Polysorbate 80 (REFRESH DRY EYE THERAPY OP) Apply to eye as needed    Historical Provider, MD   levothyroxine (SYNTHROID) 75 MCG tablet Take 1 tablet by mouth every morning (before breakfast) 22   Mita Antunez MD   lisinopril (PRINIVIL;ZESTRIL) 20 MG tablet Take 1 tablet by mouth daily  Patient not taking: No sig reported 22   Mita Antunez MD   oxybutynin (DITROPAN-XL) 10 MG extended release tablet Take 1 tablet by mouth daily  Patient taking differently: Take 10 mg by mouth at bedtime 22   Mita Antunez MD   omeprazole (PRILOSEC OTC) 20 MG tablet Take 1 tablet by mouth daily  Patient taking differently: Take 20 mg by mouth daily as needed 7/7/22   Aurelia Malik MD   diphenhydrAMINE (BENADRYL) 25 MG tablet Take 25 mg by mouth One HS for allergies    Historical Provider, MD   Multiple Vitamins-Minerals (THERAPEUTIC MULTIVITAMIN-MINERALS) tablet Take 1 tablet by mouth daily    Historical Provider, MD   Cholecalciferol 50 MCG (2000 UT) TABS Take 2,000 Units by mouth daily    Ar Automatic Reconciliation       Current medications:    Current Facility-Administered Medications   Medication Dose Route Frequency Provider Last Rate Last Admin    vancomycin (VANCOCIN) 1750 mg in sodium chloride 0.9 % 500 mL IVPB  1,750 mg IntraVENous Once Alida Dickinson MD   Held at 12/12/22 1015    0.9 % sodium chloride infusion   IntraVENous PRN Alida Dickinson MD        ceFAZolin (ANCEF) 2000 mg in sterile water 20 mL IV syringe  2,000 mg IntraVENous On Call to [facility] Donell Casey MD        fentaNYL (SUBLIMAZE) injection 100 mcg  100 mcg IntraVENous Once PRN Alida Dickinson MD        lidocaine 1 % injection 1 mL  1 mL IntraDERmal Once PRN Alida Dickinson MD        midazolam PF (VERSED) injection 2 mg  2 mg IntraVENous Once PRN Alida Dickinson MD        sodium chloride flush 0.9 % injection 5-40 mL  5-40 mL IntraVENous PRN Alida Dickinson MD        sodium chloride flush 0.9 % injection 5-40 mL  5-40 mL IntraVENous 2 times per day Alida Dickinson MD           Allergies:     Allergies   Allergen Reactions    Latex Rash    Acyclovir Other (See Comments) and Rash     fever    Seasonal     Adhesive Tape Rash       Problem List:    Patient Active Problem List   Diagnosis Code    Age-related osteoporosis without current pathological fracture M81.0    Major depressive disorder, recurrent, mild (HCC) F33.0    Lipedema R60.9    Lymphedema of both lower extremities I89.0    Pain in both lower extremities M79.604, M79.605    Spinal stenosis of lumbar region without neurogenic claudication M48.061    Situational anxiety F41.8    Acute pain of left shoulder M25.512    Home help needed Z74.2    Degenerative arthritis of left knee M17.12    Primary osteoarthritis of left knee M17.12       Past Medical History:        Diagnosis Date    Arrhythmia 2005    SVT in past, has atrial septal defect, has hx of atrial arrythmias-last time-? 2005    Arthritis     osteo    Hypertension     stopped medicine about 6 months ago, bp under control due to weight loss    Lipedema     Lipedema     right lower leg    Obesity (BMI 30-39.9) 11/14/2011    BMI-39.95    Thyroid disease 20 years    on medication       Past Surgical History:        Procedure Laterality Date    GYN  2007    ALISON    HEENT      deviated septum    HEENT  2003    blocked tear duct Lt eye then cataract to Lt eye    HEENT  2008, 2009    Blocked tear duct Rt eye with stint ;CE R also    UROLOGICAL SURGERY      bladder tack       Social History:    Social History     Tobacco Use    Smoking status: Never    Smokeless tobacco: Never   Substance Use Topics    Alcohol use: Yes     Comment: rare                                Counseling given: Not Answered      Vital Signs (Current):   Vitals:    12/12/22 0915   Pulse: 77   Resp: 18   Temp: 98.1 °F (36.7 °C)   TempSrc: Temporal   SpO2: 99%   Weight: 254 lb (115.2 kg)                                              BP Readings from Last 3 Encounters:   11/29/22 (!) 178/80   07/07/22 134/82   06/09/22 128/62       NPO Status: Time of last liquid consumption: 0700                        Time of last solid consumption: 2100                        Date of last liquid consumption: 12/12/22                        Date of last solid food consumption: 12/11/22    BMI:   Wt Readings from Last 3 Encounters:   12/12/22 254 lb (115.2 kg)   11/29/22 251 lb 4 oz (114 kg)   07/07/22 249 lb (112.9 kg)     Body mass index is 40.38 kg/m².     CBC:   Lab Results   Component Value Date/Time WBC 6.1 11/29/2022 09:59 AM    RBC 4.08 11/29/2022 09:59 AM    HGB 12.3 11/29/2022 09:59 AM    HCT 38.7 11/29/2022 09:59 AM    MCV 94.9 11/29/2022 09:59 AM    RDW 13.2 11/29/2022 09:59 AM     11/29/2022 09:59 AM       CMP:   Lab Results   Component Value Date/Time     11/29/2022 09:59 AM    K 3.6 11/29/2022 09:59 AM     11/29/2022 09:59 AM    CO2 29 11/29/2022 09:59 AM    BUN 32 11/29/2022 09:59 AM    CREATININE 0.71 11/29/2022 09:59 AM    GFRAA >60 07/07/2022 09:30 AM    AGRATIO 0.9 03/10/2022 06:42 PM    LABGLOM >60 11/29/2022 09:59 AM    GLUCOSE 83 11/29/2022 09:59 AM    PROT 7.4 07/07/2022 09:30 AM    CALCIUM 9.4 11/29/2022 09:59 AM    BILITOT 0.4 07/07/2022 09:30 AM    ALKPHOS 143 07/07/2022 09:30 AM    ALKPHOS 98 03/10/2022 06:42 PM    AST 16 07/07/2022 09:30 AM    ALT 22 07/07/2022 09:30 AM       POC Tests: No results for input(s): POCGLU, POCNA, POCK, POCCL, POCBUN, POCHEMO, POCHCT in the last 72 hours.     Coags:   Lab Results   Component Value Date/Time    PROTIME 13.6 11/29/2022 09:59 AM    INR 1.0 11/29/2022 09:59 AM    APTT 36.2 11/29/2022 09:59 AM       HCG (If Applicable): No results found for: PREGTESTUR, PREGSERUM, HCG, HCGQUANT     ABGs: No results found for: PHART, PO2ART, FUI6VLV, XPH0CML, BEART, B8LDPRWS     Type & Screen (If Applicable):  No results found for: LABABO, LABRH    Drug/Infectious Status (If Applicable):  No results found for: HIV, HEPCAB    COVID-19 Screening (If Applicable): No results found for: COVID19        Anesthesia Evaluation    Airway: Mallampati: I  TM distance: >3 FB   Neck ROM: full  Mouth opening: > = 3 FB   Dental: normal exam         Pulmonary: breath sounds clear to auscultation                             Cardiovascular:    (+) hypertension:,         Rhythm: regular  Rate: normal                    Neuro/Psych:   (+) psychiatric history:            GI/Hepatic/Renal:   (+) morbid obesity          Endo/Other:                     Abdominal: Vascular: Other Findings:           Anesthesia Plan      spinal     ASA 3     (PAC's noted)        Anesthetic plan and risks discussed with patient.               Post-op pain plan if not by surgeon: single peripheral nerve block            Parveen Solorio MD   12/12/2022

## 2022-12-13 ENCOUNTER — CARE COORDINATION (OUTPATIENT)
Dept: CARE COORDINATION | Facility: CLINIC | Age: 72
End: 2022-12-13

## 2022-12-13 VITALS
SYSTOLIC BLOOD PRESSURE: 127 MMHG | TEMPERATURE: 97.9 F | WEIGHT: 254 LBS | HEART RATE: 59 BPM | DIASTOLIC BLOOD PRESSURE: 53 MMHG | RESPIRATION RATE: 17 BRPM | OXYGEN SATURATION: 100 % | BODY MASS INDEX: 40.82 KG/M2 | HEIGHT: 66 IN

## 2022-12-13 LAB
HCT VFR BLD AUTO: 32.2 % (ref 35.8–46.3)
HGB BLD-MCNC: 10.3 G/DL (ref 11.7–15.4)

## 2022-12-13 PROCEDURE — 6370000000 HC RX 637 (ALT 250 FOR IP): Performed by: PHYSICIAN ASSISTANT

## 2022-12-13 PROCEDURE — 36415 COLL VENOUS BLD VENIPUNCTURE: CPT

## 2022-12-13 PROCEDURE — 6360000002 HC RX W HCPCS: Performed by: PHYSICIAN ASSISTANT

## 2022-12-13 PROCEDURE — 97530 THERAPEUTIC ACTIVITIES: CPT

## 2022-12-13 PROCEDURE — 97535 SELF CARE MNGMENT TRAINING: CPT

## 2022-12-13 PROCEDURE — 97165 OT EVAL LOW COMPLEX 30 MIN: CPT

## 2022-12-13 PROCEDURE — 85018 HEMOGLOBIN: CPT

## 2022-12-13 PROCEDURE — 2580000003 HC RX 258: Performed by: PHYSICIAN ASSISTANT

## 2022-12-13 RX ADMIN — SENNOSIDES AND DOCUSATE SODIUM 1 TABLET: 50; 8.6 TABLET ORAL at 08:59

## 2022-12-13 RX ADMIN — CEFAZOLIN SODIUM 2000 MG: 100 INJECTION, POWDER, LYOPHILIZED, FOR SOLUTION INTRAVENOUS at 04:10

## 2022-12-13 RX ADMIN — ACETAMINOPHEN 1000 MG: 500 TABLET, FILM COATED ORAL at 06:00

## 2022-12-13 RX ADMIN — ACETAMINOPHEN 1000 MG: 500 TABLET, FILM COATED ORAL at 00:58

## 2022-12-13 RX ADMIN — ASPIRIN 81 MG: 81 TABLET, COATED ORAL at 08:59

## 2022-12-13 RX ADMIN — ACETAMINOPHEN 1000 MG: 500 TABLET, FILM COATED ORAL at 11:15

## 2022-12-13 RX ADMIN — LEVOTHYROXINE SODIUM 75 MCG: 0.07 TABLET ORAL at 06:00

## 2022-12-13 RX ADMIN — PANTOPRAZOLE SODIUM 40 MG: 40 TABLET, DELAYED RELEASE ORAL at 06:00

## 2022-12-13 RX ADMIN — OXYCODONE 10 MG: 5 TABLET ORAL at 09:47

## 2022-12-13 RX ADMIN — OXYCODONE 10 MG: 5 TABLET ORAL at 02:18

## 2022-12-13 RX ADMIN — OXYCODONE 10 MG: 5 TABLET ORAL at 06:00

## 2022-12-13 ASSESSMENT — PAIN SCALES - GENERAL
PAINLEVEL_OUTOF10: 8
PAINLEVEL_OUTOF10: 7
PAINLEVEL_OUTOF10: 3
PAINLEVEL_OUTOF10: 6

## 2022-12-13 ASSESSMENT — PAIN DESCRIPTION - DESCRIPTORS
DESCRIPTORS: ACHING

## 2022-12-13 ASSESSMENT — PAIN DESCRIPTION - ORIENTATION
ORIENTATION: LEFT

## 2022-12-13 ASSESSMENT — PAIN DESCRIPTION - LOCATION
LOCATION: KNEE

## 2022-12-13 NOTE — PROGRESS NOTES
12/12/22 2146   Oxygen Therapy/Pulse Ox   O2 Therapy Room air   Heart Rate 67   SpO2 95 %   Pulse Oximeter Device Mode Continuous   $Pulse Oximeter $Spot check (multiple/continuous)   Patient placed on continuous sat monitor. Data cleared and alarms set. No distress noted at this time.

## 2022-12-13 NOTE — ANESTHESIA POSTPROCEDURE EVALUATION
Department of Anesthesiology  Postprocedure Note    Patient: Missy Sainz  MRN: [id number]  YOB: 1950  Date of evaluation: 12/13/2022      Procedure Summary     Date: 12/12/22 Room / Location: [facility] MAIN OR 08 / [facility] MAIN OR    Anesthesia Start: 1204 Anesthesia Stop: 5946    Procedure: LT KNEE TOTAL ARTHROPLASTY ROBOTIC (Left: Knee) Diagnosis:       Degenerative arthritis of left knee      (Degenerative arthritis of left knee [M17.12])    Surgeons: Butch Lucas MD Responsible Provider: Sherin Skiff, MD    Anesthesia Type: spinal ASA Status: 3          Anesthesia Type: No value filed.     Yign Phase I: Ying Score: 7    Ying Phase II:        Anesthesia Post Evaluation    Patient location during evaluation: PACU  Patient participation: complete - patient participated  Level of consciousness: awake and alert  Airway patency: patent  Nausea & Vomiting: no nausea and no vomiting  Complications: no  Cardiovascular status: hemodynamically stable  Respiratory status: acceptable, nonlabored ventilation and spontaneous ventilation  Hydration status: euvolemic  Comments: /64   Pulse 72   Temp 97.7 °F (36.5 °C) (Oral)   Resp 17   Ht 5' 6\" (1.676 m)   Wt 254 lb (115.2 kg)   SpO2 98%   BMI 41.00 kg/m²     Multimodal analgesia pain management approach

## 2022-12-13 NOTE — PROGRESS NOTES
OCCUPATIONAL THERAPY Initial Assessment, Daily Note, Discharge, and AM      (Link to Caseload Tracking: OT Visit Days: 1  OT Orders   Time  OT Charge Capture  Rehab Caseload Tracker  Episode     Cristal Real is a 81 y.o. female   PRIMARY DIAGNOSIS: Status post left knee replacement  Degenerative arthritis of left knee [M17.12]  Primary osteoarthritis of left knee [M17.12]  Procedure(s) (LRB):  LT KNEE TOTAL ARTHROPLASTY ROBOTIC (Left)  1 Day Post-Op  Reason for Referral: Pain in Left Knee (M25.562)  Stiffness of Left Knee, Not elsewhere classified (E54.064)  Outpatient in a bed: Payor: MEDICARE / Plan: MEDICARE PART A AND B / Product Type: *No Product type* /     ASSESSMENT:     REHAB RECOMMENDATIONS:   Recommendation to date pending progress:  Setting:  No further skilled therapy after discharge from hospital    Equipment:    To Be Determined     ASSESSMENT:  Ms. Song is s/p left TKA. Initial evaluation completed. Patient completed shower and dressing as charted below in ADL grid and is ambulating with rolling walker and contact guard assist.  Patient has met 4/4 goals and plans to return home with good family support. Family able to provide patient with appropriate level of assistance at this time. OT reviewed safety precautions throughout session and therapy schedule for the remainder of today. Patient instructed to call for assistance when needing to get up from recliner and all needs in reach. Patient verbalized understanding of call light.         [employer] AM-PAC 6 Clicks Daily Activity Inpatient Short Form:    AM-PAC Daily Activity Inpatient   How much help for putting on and taking off regular lower body clothing?: A Little  How much help for Bathing?: None  How much help for Toileting?: None  How much help for putting on and taking off regular upper body clothing?: None  How much help for taking care of personal grooming?: None  How much help for eating meals?: None  AM-PAC Inpatient Daily Activity Raw Score: 23  AM-PAC Inpatient ADL T-Scale Score : 51.12  ADL Inpatient CMS 0-100% Score: 15.86  ADL Inpatient CMS G-Code Modifier : CI     SUBJECTIVE:     Ms. Song states, \"I plan to go home today\"      Social/Functional   Lives With: Spouse  Type of Home: House  Home Layout: One level  Home Access: Stairs to enter without rails  Entrance Stairs - Number of Steps: 3  Bathroom Shower/Tub: Walk-in shower  Bathroom Toilet: Handicap height  Bathroom Equipment: Grab bars in shower, Shower chair, Grab bars around toilet  Home Equipment: Tyrell Shank, rolling  ADL Assistance: Independent  Homemaking Assistance: Independent  Ambulation Assistance: Independent  Transfer Assistance: Independent    OBJECTIVE:     LINES / Daisy Apodaca / Isabell [place]: KATJA    RESTRICTIONS/PRECAUTIONS:  Restrictions/Precautions: Weight Bearing, Fall Risk  Right Lower Extremity Weight Bearing: Weight Bearing As Tolerated  Left Lower Extremity Weight Bearing: Weight Bearing As Tolerated    PAIN: VITALS / O2:   Pre Treatment:   Pain Assessment: None - Denies Pain      Post Treatment: 0/10 Vitals          Oxygen        GROSS EVALUATION: INTACT IMPAIRED   (See Comments)   UE AROM [x] []   UE PROM [] []   Strength [x]       Posture / Balance []  decreased   Sensation [x]     Coordination [x]       Tone [x]       Edema []    Activity Tolerance []  Fair     Hand Dominance R [x] L []      COGNITION/  PERCEPTION: INTACT IMPAIRED   (See Comments)   Orientation [x]     Vision [x]     Hearing [x]     Cognition  [x]     Perception [x]       MOBILITY: I Mod I S SBA CGA Min Mod Max Total  NT x2 Comments:   Bed Mobility    Rolling [] [] [] [] [] [] [] [] [] [] []    Supine to Sit [] [] [] [x] [] [] [] [] [] [] []    Scooting [] [] [] [x] [] [] [] [] [] [] []    Sit to Supine [] [] [] [] [] [] [] [] [] [] []    Transfers    Sit to Stand [] [] [] [] [x] [] [] [] [] [] []    Bed to Chair [] [] [] [] [x] [] [] [] [] [] []    Stand to Sit [] [] [] [] [x] [] [] [] [] [] []    Tub/Shower [] [] [] [] [x] [] [] [] [] [] []       Toilet [] [] [] [] [x] [] [] [] [] [] []        [] [] [] [] [] [] [] [] [] [] []    I=Independent, Mod I=Modified Independent, S=Supervision/Setup, SBA=Standby Assistance, CGA=Contact Guard Assistance, Min=Minimal Assistance, Mod=Moderate Assistance, Max=Maximal Assistance, Total=Total Assistance, NT=Not Tested    ACTIVITIES OF DAILY LIVING: I Mod I S SBA CGA Min Mod Max Total NT Comments   BASIC ADLs:              Upper Body Bathing [] [] [x] [] [] [] [] [] [] []    Lower Body Bathing [] [] [x] [] [] [] [] [] [] []    Toileting [] [] [x] [] [] [] [] [] [] []    Upper Body Dressing [] [] [x] [] [] [] [] [] [] []    Lower Body Dressing [] [] [x] [] [] [] [] [] [] []    Feeding [x] [] [] [] [] [] [] [] [] []    Grooming [] [] [x] [] [] [] [] [] [] []    Personal Device Care [] [] [] [] [] [] [] [] [] []    Functional Mobility [] [] [] [] [x] [] [] [] [] []    I=Independent, Mod I=Modified Independent, S=Supervision/Setup, SBA=Standby Assistance, CGA=Contact Guard Assistance, Min=Minimal Assistance, Mod=Moderate Assistance, Max=Maximal Assistance, Total=Total Assistance, NT=Not Tested    PLAN:     FREQUENCY/DURATION     for duration of hospital stay or until stated goals are met, whichever comes first.    ACUTE OCCUPATIONAL THERAPY GOALS:   (Developed with and agreed upon by patient and/or caregiver.)    GOALS:   DISCHARGE GOALS (in preparation for going home/rehab):  3 days all goals met 12/13/2022   1. Ms. Song will perform lower body dressing activity with minimal assist required to demonstrate improved functional mobility and safety. 2.  Ms. Nohemi will perform bathing activity with minimal assist required to demonstrate improved functional mobility and safety. 3.  Ms. Nohemi will perform toileting activity with  minimal assist to demonstrate improved functional mobility and safety.   4.  Ms. Nohemi will perform all functional transfers transfer with minimal assist to demonstrate improved functional mobility and safety. PROBLEM LIST:   (Skilled intervention is medically necessary to address:)  Decreased ADL/Functional Activities  Decreased Activity Tolerance  Decreased Balance  Decreased Strength  Decreased Transfer Abilities   INTERVENTIONS PLANNED:   (Benefits and precautions of occupational therapy have been discussed with the patient.)  Self Care Training  Therapeutic Activity  Therapeutic Exercise/HEP  Neuromuscular Re-education  Manual Therapy  Education         TREATMENT:     EVALUATION: LOW COMPLEXITY: (Untimed Charge)    TREATMENT:   Self Care (50 minutes): Patient participated in upper body bathing, lower body bathing, toileting, upper body dressing, lower body dressing, and grooming ADLs in unsupported sitting and standing with minimal verbal cueing to increase independence and increase activity tolerance. Patient also participated in functional mobility, functional transfer, and adaptive equipment training to increase independence and increase activity tolerance.      AFTER TREATMENT PRECAUTIONS: Bed/Chair Locked, Call light within reach, Chair, Needs within reach, and RN notified    INTERDISCIPLINARY COLLABORATION:  RN/ PCT and PT/ PTA    EDUCATION:  Education Given To: Patient  Education Provided: Role of Therapy, Plan of Care, ADL Adaptive Strategies, Transfer Training  Education Method: Demonstration  Barriers to Learning: None  Education Outcome: Verbalized understanding, Demonstrated understanding  [x] Safe And Effective Hygiene  [x] Fall Precautions  [] Hip Precautions  [] D/C Instruction Review [] Prosthesis Review  [x] Walker Management/Safety  [x] Adaptive Equipment as Needed  [x] Therapeutic Resting Position of Joint       TOTAL TREATMENT DURATION AND TIME:  Time In: 0403  Time Out: 0720  Minutes: 5695 Mica Barraza

## 2022-12-13 NOTE — CARE COORDINATION
Pt to receive [place] following DC from inpatient. SW CM removed from care team at this time. Can rejoin in the future if indicated.

## 2022-12-13 NOTE — PROGRESS NOTES
Orthopedic Joint Progress Note    2022  Admit Date: 2022  Admit Diagnosis: Degenerative arthritis of left knee [M17.12]  Primary osteoarthritis of left knee [M17.12]    1 Day Post-Op    Subjective:     Penny Andrews  doing well. Pain well-controlled. Ready to go home. Review of Systems: Musculoskeletal and general review of systems are unchanged    Objective:     PT/OT:     PATIENT MOBILITY                           Vital Signs:    Blood pressure 101/64, pulse 72, temperature 97.7 °F (36.5 °C), temperature source Oral, resp. rate 17, height 5' 6\" (1.676 m), weight 254 lb (115.2 kg), SpO2 98 %.   Temp (24hrs), Av.8 °F (36.6 °C), Min:97.5 °F (36.4 °C), Max:98.1 °F (36.7 °C)      Pain Control:        Meds:  Current Facility-Administered Medications   Medication Dose Route Frequency    cetirizine (ZYRTEC) tablet 10 mg  10 mg Oral QHS    diphenhydrAMINE (BENADRYL) capsule 25 mg  25 mg Oral Nightly PRN    hydrOXYzine HCl (ATARAX) tablet 25 mg  25 mg Oral 4x Daily PRN    levothyroxine (SYNTHROID) tablet 75 mcg  75 mcg Oral QAM AC    pantoprazole (PROTONIX) tablet 40 mg  40 mg Oral QAM AC    oxybutynin (DITROPAN-XL) extended release tablet 10 mg  10 mg Oral Nightly    0.9 % sodium chloride infusion   IntraVENous PRN    acetaminophen (TYLENOL) tablet 1,000 mg  1,000 mg Oral Q6H    oxyCODONE (ROXICODONE) immediate release tablet 10 mg  10 mg Oral Q4H PRN    HYDROmorphone HCl PF (DILAUDID) injection 1 mg  1 mg IntraVENous Q3H PRN    ondansetron (ZOFRAN-ODT) disintegrating tablet 4 mg  4 mg Oral Q8H PRN    Or    ondansetron (ZOFRAN) injection 4 mg  4 mg IntraVENous Q6H PRN    sennosides-docusate sodium (SENOKOT-S) 8.6-50 MG tablet 1 tablet  1 tablet Oral BID    aspirin EC tablet 81 mg  81 mg Oral BID    lactated ringers infusion   IntraVENous Continuous        LAB:    Lab Results   Component Value Date/Time    INR 1.0 2022 09:59 AM     Lab Results   Component Value Date/Time    HGB 10.3 12/13/2022 04:22 AM    HGB 11.5 12/12/2022 08:25 PM    HGB 12.3 11/29/2022 09:59 AM              Physical Exam:  No significant changes  Incision and bandage are dry     Assessment:      Principal Problem:    Status post left knee replacement  Active Problems:    Primary osteoarthritis of left knee    Degenerative arthritis of left knee  Resolved Problems:    * No resolved hospital problems. *       Plan:     Patient looks very good. Continue PT/OT/Rehab  Anticipated discharge home today if stable. Prescriptions e-prescribed to pharmacy. I have reviewed the patients controlled substance prescription history, as maintained in the [place] prescription monitoring program, so that the prescription(s) for a  controlled substance can be given.

## 2022-12-13 NOTE — PROGRESS NOTES
ACUTE PHYSICAL THERAPY GOALS:   (Developed with and agreed upon by patient and/or caregiver.)  GOALS (1-4 days):  (1.)Ms. Gilberto Lomeli will move from supine to sit and sit to supine  in bed with MODIFIED INDEPENDENCE. (2.)Ms. Gilberto Lomeli will transfer from bed to chair and chair to bed with MODIFIED INDEPENDENCE using the least restrictive device. (3.)Ms. Gilberto Lomeli will ambulate with MODIFIED INDEPENDENCE for 250 feet with the least restrictive device. (4.)Ms. Gilberto Lomeli will ambulate up/down 2 steps with left railing with MODIFIED INDEPENDENCE using no assistive device. -GOAL MET 12/13/22    (5.)Ms. Gilberto Lomeli will increase left knee ROM to 0-115°.       PHYSICAL THERAPY JOINT CAMP: TOTAL KNEE ARTHROPLASTY Daily Note and AM  (Link to Caseload Tracking: PT Visit Days : 1  Acknowledge Orders  Time In/Out  PT Charge Capture  Rehab Caseload Tracker  Episode   Skye Hobbs is a 81 y.o. female   PRIMARY DIAGNOSIS: Status post left knee replacement  Degenerative arthritis of left knee [M17.12]  Primary osteoarthritis of left knee [M17.12]  Procedure(s) (LRB):  LT KNEE TOTAL ARTHROPLASTY ROBOTIC (Left)  1 Day Post-Op  Reason for Referral: Pain in Left Knee (M25.562)  Stiffness of Left Knee, Not elsewhere classified (M25.662)  Difficulty in walking, Not elsewhere classified (R26.2)  Outpatient in a bed: Payor: MEDICARE / Plan: MEDICARE PART A AND B / Product Type: *No Product type* /     REHAB RECOMMENDATIONS:   Recommendation to date pending progress:  Setting:  Home Health Therapy    Equipment:    3 in 1 Bedside Commode     RANGE OF MOTION:   Left Knee Flexion: L Knee Flexion (0-145): 85  Left Knee Extension: L Knee Extension (0): 4     GAIT: I Mod I S SBA CGA Min Mod Max Total  NT x2 Comments:   Level of Assistance [] [] [] [x] [] [] [] [] [] [] []            Weightbearing Status  Left Lower Extremity Weight Bearing: Weight Bearing As Tolerated    Distance  100 (+ 100) feet    Gait Quality Antalgic    DME Rolling Walker     Stairs Ramp     I=Independent, Mod I=Modified Independent, S=Supervision, SBA=Standby Assistance, CGA=Contact Guard Assistance,   Min=Minimal Assistance, Mod=Moderate Assistance, Max=Maximal Assistance, Total=Total Assistance, NT=Not Tested    ASSESSMENT:   ASSESSMENT:  Ms. Song is a 81 y.o. female POD #0 s/p L TKA. Upon PT evaluation, pt exhibits expected post-operative strength, balance, and ROM deficits resulting in limited independence with functional mobility. At baseline, pt was mod I for mobility with RW. Pt is now requiring CGA-min A for transfers and ambulation in room with RW.      12/13 participated well. Sitting in the chair upon arrival.  Performs and review L knee HEP  with guidance. Sit>stand with SBA and RW in front. Ambulated 100 ft down the grimm using RW with SBA, while working on reciprocal gait pattern. Therapist instructed/pt demonstrated going up/down the stairs with good technique. Then ambulated another 100 ft back to the room. Therapist ask pt to do exercises one more time this afternoon. Therapist review cool jet for swelling and pain as needed. Remain in the chair with needs in reach, ice to L knee and instructed to call for assists, before getting up. All question answer and ready for D/C. Talisha Villalobos      Outcome Measure:   KOOS-JR:       SUBJECTIVE:   Ms. Song states, \"my daughter is on her way\"    Home Environment/Prior Level of Function Lives With: Spouse  Type of Home: House  Home Layout: One level  Home Access: Stairs to enter without rails  Entrance Stairs - Number of Steps: 3  Bathroom Shower/Tub: Walk-in shower  Bathroom Toilet: Handicap height  Bathroom Equipment: Grab bars in shower, Shower chair, Grab bars around toilet  Home Equipment: Amandan Suraj, rolling  ADL Assistance: Independent  Homemaking Assistance: Independent  Ambulation Assistance: Independent  Transfer Assistance: Independent    OBJECTIVE:     PAIN: VITAL SIGNS: LINES/DRAINS:   Pre Treatment:         Post Treatment:ice to L knee Vitals        Oxygen    IV    RESTRICTIONS/PRECAUTIONS:  Restrictions/Precautions: Weight Bearing, Fall Risk  Right Lower Extremity Weight Bearing: Weight Bearing As Tolerated  Left Lower Extremity Weight Bearing: Weight Bearing As Tolerated        Restrictions/Precautions: Weight Bearing, Fall Risk        LOWER EXTREMITY GROSS EVALUATION:  RIGHT LE   Within Functional Limits   Abnormal   Comments   Strength [x] []     Range of Motion [x] []        LEFT LE Within Functional Limits   Abnormal   Comments   Strength [] [x]  L knee 3/5   Range of Motion [] [] AROM LLE (degrees)  L Knee Flexion (0-145): 85  L Knee Extension (0): 4     UPPER EXTREMITY GROSS EVALUATION:     Within Functional Limits   Abnormal   Comments   Strength [x] []    Range of Motion [x] []      SENSATION  Sensation [x]       COGNITION/  PERCEPTION: Intact Impaired (Comments):   Orientation [x]     Vision [x]     Hearing [x]     Cognition  [x]       MOBILITY: I Mod I S SBA CGA Min Mod Max Total  NT x2 Comments:   Bed Mobility    Rolling [] [] [] [] [] [] [] [] [] [x] []    Supine to Sit [] [] [] [] [] [] [] [] [] [x] []    Scooting [] [] [] [] [] [] [] [] [] [x] []    Sit to Supine [] [] [] [] [] [] [] [] [] [x] []    Transfers    Sit to Stand [] [] [] [x] [] [] [] [] [] [] []    Bed to Chair [] [] [] [x] [] [] [] [] [] [] []    Stand to Sit [] [] [] [x] [] [] [] [] [] [] []    Stand Pivot [] [] [] [x] [] [] [] [] [] [] []    Toilet [] [] [] [] [] [] [] [] [] [x] []     [] [] [] [] [] [] [] [] [] [] []    I=Independent, Mod I=Modified Independent, S=Supervision, SBA=Standby Assistance, CGA=Contact Guard Assistance,   Min=Minimal Assistance, Mod=Moderate Assistance, Max=Maximal Assistance, Total=Total Assistance, NT=Not Tested    BALANCE: Good Fair+ Fair Fair- Poor NT Comments   Sitting Static [x] [] [] [] [] []    Sitting Dynamic [x] [] [] [] [] []              Standing Static [] [x] [] [] [] []    Standing Dynamic [] [] [] [] [] []      PLAN:   FREQUENCY AND DURATION: BID for duration of hospital stay or until stated goals are met, whichever comes first.    THERAPY PROGNOSIS: Good    PROBLEM LIST:   (Skilled intervention is medically necessary to address:)  Decreased ADL/Functional Activities, Decreased Activity Tolerance, Decreased AROM/PROM, Decreased Gait Ability, Decreased Strength, Decreased Transfer Abilities, and Increased Pain   INTERVENTIONS PLANNED:   (Benefits and precautions of physical therapy have been discussed with the patient.)  Therapeutic Activity, Therapeutic Exercise/HEP, Gait Training, Modalities, and Education       TREATMENT:   EVALUATION: LOW COMPLEXITY: (Untimed Charge)    TREATMENT:   Therapeutic Activity (40 Minutes): Therapeutic activity included Scooting, Lateral Scooting, Transfer Training, Ambulation on level ground, Stair Training, Sitting balance , and Standing balance to improve functional Activity tolerance, Balance, Coordination, Mobility, Strength, and ROM.     TREATMENT GRID:  THERAPEUTIC  EXERCISES: DATE:  12/12 DATE:  12/13   DATE:      AM PM AM PM AM PM    [] [x] [] [] [] []   Ankle Pumps  10 15      Quad Sets  10 15      Gluteal Sets  10 15      Hip Abd/ADduction  10 15      Straight Leg Raises   15      Knee Slides  10 15      Short Arc Quads  10 15      Chair Slides  10 - AA 15                        B = bilateral; AA = active assistive; A = active; P = passive      EDUCATION: Education Given To: Patient  Education Provided: Role of Therapy, Home Exercise Program  Education Method: Demonstration, Verbal  EDUCATION:  [x] Home Exercises  [x] Fall Precautions  [x] No Pillow Under Knee  [] D/C Instruction Review [x] Cryocuff  [x] Walker Management/Safety  [] Adaptive Equipment as Needed     AFTER TREATMENT PRECAUTIONS: Bed/Chair Locked, Call light within reach, Chair, Heels floated, Needs within reach, RN notified, and Visitors at bedside    INTERDISCIPLINARY COLLABORATION:  RN/ PCT and PT/

## 2022-12-20 DIAGNOSIS — Z96.652 STATUS POST LEFT KNEE REPLACEMENT: Primary | ICD-10-CM

## 2022-12-20 RX ORDER — OXYCODONE HYDROCHLORIDE 5 MG/1
5 TABLET ORAL EVERY 4 HOURS PRN
Qty: 30 TABLET | Refills: 0 | Status: SHIPPED | OUTPATIENT
Start: 2022-12-20 | End: 2022-12-25

## 2022-12-22 ENCOUNTER — TELEPHONE (OUTPATIENT)
Dept: FAMILY MEDICINE CLINIC | Facility: CLINIC | Age: 72
End: 2022-12-22

## 2022-12-22 NOTE — TELEPHONE ENCOUNTER
A PA request came in for the patient's Hydroxyzine HCL 25 mg. The patient is to take this 4 times a day PRN. The patient can get a coupon from [facility] [employer] and get this medication from 12$ to 17$.     Patient was informed that she can call the pharmacies mentioned in the conversation and get that script from [facility] to the new pharmacy.

## 2022-12-30 ENCOUNTER — TELEPHONE (OUTPATIENT)
Dept: ORTHOPEDICS UNIT | Age: 72
End: 2022-12-30

## 2022-12-30 DIAGNOSIS — Z96.652 STATUS POST LEFT KNEE REPLACEMENT: Primary | ICD-10-CM

## 2022-12-30 RX ORDER — AMOXICILLIN 500 MG/1
TABLET, FILM COATED ORAL
Qty: 4 TABLET | Refills: 2 | Status: SHIPPED | OUTPATIENT
Start: 2022-12-30

## 2022-12-30 NOTE — TELEPHONE ENCOUNTER
Patient called to report that while brushing her teeth last night--a filling from her tooth fell out. Tooth is bothering patient and she is hoping to have filling replaced next week. Almost 3 weeks post op TKA. Advised regarding antibiotics that need to be taken 1 hour prior to procedure. Antibiotic prescription will be e-scribed per Dr. Black Covert office. Patient verbalized understanding.

## 2023-01-03 ENCOUNTER — CLINICAL DOCUMENTATION (OUTPATIENT)
Dept: PHYSICAL THERAPY | Age: 73
End: 2023-01-03

## 2023-01-03 DIAGNOSIS — Z96.652 STATUS POST LEFT KNEE REPLACEMENT: Primary | ICD-10-CM

## 2023-01-03 RX ORDER — OXYCODONE HYDROCHLORIDE 5 MG/1
5 TABLET ORAL EVERY 4 HOURS PRN
Qty: 30 TABLET | Refills: 0 | Status: SHIPPED | OUTPATIENT
Start: 2023-01-03 | End: 2023-01-08

## 2023-01-09 ENCOUNTER — OFFICE VISIT (OUTPATIENT)
Dept: FAMILY MEDICINE CLINIC | Facility: CLINIC | Age: 73
End: 2023-01-09
Payer: MEDICARE

## 2023-01-09 VITALS
BODY MASS INDEX: 39.7 KG/M2 | HEIGHT: 66 IN | WEIGHT: 247 LBS | SYSTOLIC BLOOD PRESSURE: 122 MMHG | OXYGEN SATURATION: 97 % | DIASTOLIC BLOOD PRESSURE: 76 MMHG | HEART RATE: 67 BPM | TEMPERATURE: 97.8 F

## 2023-01-09 DIAGNOSIS — E03.9 HYPOTHYROIDISM, UNSPECIFIED TYPE: ICD-10-CM

## 2023-01-09 DIAGNOSIS — R79.9 ABNORMAL FINDING OF BLOOD CHEMISTRY, UNSPECIFIED: ICD-10-CM

## 2023-01-09 DIAGNOSIS — F41.8 SITUATIONAL ANXIETY: ICD-10-CM

## 2023-01-09 DIAGNOSIS — Z00.00 MEDICARE ANNUAL WELLNESS VISIT, SUBSEQUENT: ICD-10-CM

## 2023-01-09 DIAGNOSIS — M54.50 RIGHT LOW BACK PAIN, UNSPECIFIED CHRONICITY, UNSPECIFIED WHETHER SCIATICA PRESENT: ICD-10-CM

## 2023-01-09 LAB
BASOPHILS # BLD: 0.1 K/UL (ref 0–0.2)
BASOPHILS NFR BLD: 1 % (ref 0–2)
DIFFERENTIAL METHOD BLD: ABNORMAL
EOSINOPHIL # BLD: 0.3 K/UL (ref 0–0.8)
EOSINOPHIL NFR BLD: 5 % (ref 0.5–7.8)
ERYTHROCYTE [DISTWIDTH] IN BLOOD BY AUTOMATED COUNT: 13.4 % (ref 11.9–14.6)
HCT VFR BLD AUTO: 37.2 % (ref 35.8–46.3)
HGB BLD-MCNC: 11.6 G/DL (ref 11.7–15.4)
IMM GRANULOCYTES # BLD AUTO: 0 K/UL (ref 0–0.5)
IMM GRANULOCYTES NFR BLD AUTO: 0 % (ref 0–5)
LYMPHOCYTES # BLD: 1.5 K/UL (ref 0.5–4.6)
LYMPHOCYTES NFR BLD: 28 % (ref 13–44)
MCH RBC QN AUTO: 30.1 PG (ref 26.1–32.9)
MCHC RBC AUTO-ENTMCNC: 31.2 G/DL (ref 31.4–35)
MCV RBC AUTO: 96.4 FL (ref 82–102)
MONOCYTES # BLD: 0.4 K/UL (ref 0.1–1.3)
MONOCYTES NFR BLD: 7 % (ref 4–12)
NEUTS SEG # BLD: 3.3 K/UL (ref 1.7–8.2)
NEUTS SEG NFR BLD: 59 % (ref 43–78)
NRBC # BLD: 0 K/UL (ref 0–0.2)
PLATELET # BLD AUTO: 422 K/UL (ref 150–450)
PMV BLD AUTO: 10.6 FL (ref 9.4–12.3)
RBC # BLD AUTO: 3.86 M/UL (ref 4.05–5.2)
WBC # BLD AUTO: 5.5 K/UL (ref 4.3–11.1)

## 2023-01-09 PROCEDURE — 99214 OFFICE O/P EST MOD 30 MIN: CPT | Performed by: FAMILY MEDICINE

## 2023-01-09 PROCEDURE — G0439 PPPS, SUBSEQ VISIT: HCPCS | Performed by: FAMILY MEDICINE

## 2023-01-09 PROCEDURE — 1090F PRES/ABSN URINE INCON ASSESS: CPT | Performed by: FAMILY MEDICINE

## 2023-01-09 PROCEDURE — 1036F TOBACCO NON-USER: CPT | Performed by: FAMILY MEDICINE

## 2023-01-09 PROCEDURE — 3017F COLORECTAL CA SCREEN DOC REV: CPT | Performed by: FAMILY MEDICINE

## 2023-01-09 PROCEDURE — G8399 PT W/DXA RESULTS DOCUMENT: HCPCS | Performed by: FAMILY MEDICINE

## 2023-01-09 PROCEDURE — 1123F ACP DISCUSS/DSCN MKR DOCD: CPT | Performed by: FAMILY MEDICINE

## 2023-01-09 PROCEDURE — G8427 DOCREV CUR MEDS BY ELIG CLIN: HCPCS | Performed by: FAMILY MEDICINE

## 2023-01-09 PROCEDURE — G8417 CALC BMI ABV UP PARAM F/U: HCPCS | Performed by: FAMILY MEDICINE

## 2023-01-09 PROCEDURE — G8484 FLU IMMUNIZE NO ADMIN: HCPCS | Performed by: FAMILY MEDICINE

## 2023-01-09 RX ORDER — LEVOTHYROXINE SODIUM 0.07 MG/1
75 TABLET ORAL
Qty: 90 TABLET | Refills: 1 | Status: SHIPPED | OUTPATIENT
Start: 2023-01-09 | End: 2023-01-11

## 2023-01-09 RX ORDER — OMEPRAZOLE 20 MG/1
20 CAPSULE, DELAYED RELEASE ORAL
Qty: 90 CAPSULE | Refills: 1 | Status: SHIPPED | OUTPATIENT
Start: 2023-01-09

## 2023-01-09 RX ORDER — OXYBUTYNIN CHLORIDE 10 MG/1
10 TABLET, EXTENDED RELEASE ORAL NIGHTLY
Qty: 90 TABLET | Refills: 1 | Status: SHIPPED | OUTPATIENT
Start: 2023-01-09

## 2023-01-09 ASSESSMENT — PATIENT HEALTH QUESTIONNAIRE - PHQ9
SUM OF ALL RESPONSES TO PHQ QUESTIONS 1-9: 1
1. LITTLE INTEREST OR PLEASURE IN DOING THINGS: 1
SUM OF ALL RESPONSES TO PHQ9 QUESTIONS 1 & 2: 1
SUM OF ALL RESPONSES TO PHQ QUESTIONS 1-9: 1
SUM OF ALL RESPONSES TO PHQ QUESTIONS 1-9: 1
2. FEELING DOWN, DEPRESSED OR HOPELESS: 0
SUM OF ALL RESPONSES TO PHQ QUESTIONS 1-9: 1

## 2023-01-09 ASSESSMENT — LIFESTYLE VARIABLES: HOW OFTEN DO YOU HAVE A DRINK CONTAINING ALCOHOL: MONTHLY OR LESS

## 2023-01-09 NOTE — PROGRESS NOTES
Fran Cesar (: 1950) is a 81 y.o. female, established patient, here for evaluation of the following chief complaint(s):  Follow-up Chronic Condition, Medicare AWV (Last one on 22 ), Other (Wants Prilosec rx/), and Weight Loss       ASSESSMENT/PLAN:  1. Situational anxiety  -     Hemoglobin A1C; Future  -     CBC with Auto Differential; Future  -     Comprehensive Metabolic Panel; Future  -     Lipid Panel; Future  -     TSH; Future  2. Right low back pain, unspecified chronicity, unspecified whether sciatica present  -     Hemoglobin A1C; Future  -     CBC with Auto Differential; Future  -     Comprehensive Metabolic Panel; Future  -     Lipid Panel; Future  -     TSH; Future  3. Hypothyroidism, unspecified type  -     Hemoglobin A1C; Future  -     CBC with Auto Differential; Future  -     Comprehensive Metabolic Panel; Future  -     Lipid Panel; Future  -     TSH; Future  4. Medicare annual wellness visit, subsequent  -     Hemoglobin A1C; Future  -     CBC with Auto Differential; Future  -     Comprehensive Metabolic Panel; Future  -     Lipid Panel; Future  -     TSH; Future  5. Abnormal finding of blood chemistry, unspecified   -     Hemoglobin A1C; Future  Recheck labs today,   Refill chronic meds  Worsening reflux at times, will give rx for prilosec 20mg po daily as needed  Return to office for recheck chronic conditions in 6 months  Patient joining a weight loss program, wrote letter for her to join, and with a weight goal of 175 lbs. SUBJECTIVE/OBJECTIVE:  Other    Weight Loss    Hypothyroidism - stable, well controlled with medication, no symptoms of dry skin, changes in weight, hair, skin, no worsening fatigue. Taking medications as prescribed. Lab Results   Component Value Date/Time    TSH 5.320 2022 09:21 AM       Recent knee surgery    Weight loss goals, going to join a program    Physical Exam  Vitals and nursing note reviewed.    Constitutional:       General: She is not in acute distress. Appearance: Normal appearance. She is obese. She is not ill-appearing. HENT:      Head: Normocephalic and atraumatic. Right Ear: External ear normal.      Left Ear: External ear normal.      Nose: Nose normal.      Mouth/Throat:      Mouth: Mucous membranes are dry. Eyes:      Extraocular Movements: Extraocular movements intact. Pupils: Pupils are equal, round, and reactive to light. Cardiovascular:      Rate and Rhythm: Normal rate. Pulses: Normal pulses. Pulmonary:      Effort: Pulmonary effort is normal.      Breath sounds: Normal breath sounds. Abdominal:      General: There is no distension. Musculoskeletal:         General: Swelling present. Normal range of motion. Cervical back: Normal range of motion and neck supple. Comments: Some swelling in left leg post op   Skin:     General: Skin is warm and dry. Neurological:      General: No focal deficit present. Mental Status: She is alert and oriented to person, place, and time. Psychiatric:         Mood and Affect: Mood normal.         On this date 01/09/23  I have spent 30 minutes reviewing previous notes, test results and face to face with the patient discussing the diagnosis and importance of compliance with the treatment plan as well as documenting on the day of the visit. An electronic signature was used to authenticate this note. -- Liz Gan MD     Medicare Annual Wellness Visit    Jose Mcguire is here for Follow-up Chronic Condition, Medicare AWV (Last one on 1/5/22 ), Other (Wants Prilosec rx/), and Weight Loss    Assessment & Plan   Situational anxiety  -     Hemoglobin A1C; Future  -     CBC with Auto Differential; Future  -     Comprehensive Metabolic Panel; Future  -     Lipid Panel; Future  -     TSH;  Future  Right low back pain, unspecified chronicity, unspecified whether sciatica present  -     Hemoglobin A1C; Future  -     CBC with Auto Differential; Future  -     Comprehensive Metabolic Panel; Future  -     Lipid Panel; Future  -     TSH; Future  Hypothyroidism, unspecified type  -     Hemoglobin A1C; Future  -     CBC with Auto Differential; Future  -     Comprehensive Metabolic Panel; Future  -     Lipid Panel; Future  -     TSH; Future  Medicare annual wellness visit, subsequent  -     Hemoglobin A1C; Future  -     CBC with Auto Differential; Future  -     Comprehensive Metabolic Panel; Future  -     Lipid Panel; Future  -     TSH; Future  Abnormal finding of blood chemistry, unspecified   -     Hemoglobin A1C; Future    Recommendations for Preventive Services Due: see orders and patient instructions/AVS.  Recommended screening schedule for the next 5-10 years is provided to the patient in written form: see Patient Instructions/AVS.     Return in about 6 months (around 7/9/2023) for chronic care fu. Subjective   The following acute and/or chronic problems were also addressed today:  See above    Patient's complete Health Risk Assessment and screening values have been reviewed and are found in Flowsheets. The following problems were reviewed today and where indicated follow up appointments were made and/or referrals ordered.     Positive Risk Factor Screenings with Interventions:    Fall Risk:  Do you feel unsteady or are you worried about falling? : (!) yes  2 or more falls in past year?: no  Fall with injury in past year?: no  Timed Up and Go Test > 12 seconds?: no  Interventions:    See AVS for additional education material  See A/P for plan and any pertinent orders              Weight and Activity:  Physical Activity: Inactive    Days of Exercise per Week: 0 days    Minutes of Exercise per Session: 0 min     On average, how many days per week do you engage in moderate to strenuous exercise (like a brisk walk)?: 0 days  Have you lost any weight without trying in the past 3 months?: No  Body mass index: (!) 39.86    Inactivity Interventions:  See AVS for additional education material  See A/P for plan and any pertinent orders  Obesity Interventions:  See AVS for additional education material  See A/P for plan and any pertinent orders             Safety:  Do all of your stairways have a railing or banister?: (!) No  Interventions:  See AVS for additional education material  See A/P for plan and any pertinent orders    ADL's:   Patient reports needing help with:  Select all that apply: (!) Walking/Balance (due to knee suregery)  Select all that apply: (!) Shopping (due to knee surgery)  Interventions:  See AVS for additional education material    Advanced Directives:  Do you have a Living Will?: (!) No (pt thinks she has a form already)    Intervention:  has an advanced directive - a copy HAS NOT been provided. Objective   Vitals:    01/09/23 0937   BP: 122/76   Site: Left Upper Arm   Position: Sitting   Cuff Size: Medium Adult   Pulse: 67   Temp: 97.8 °F (36.6 °C)   TempSrc: Oral   SpO2: 97%   Weight: 247 lb (112 kg)   Height: 5' 6\" (1.676 m)      Body mass index is 39.87 kg/m². Allergies   Allergen Reactions    Latex Rash    Acyclovir Other (See Comments) and Rash     fever    Seasonal     Adhesive Tape Rash     Prior to Visit Medications    Medication Sig Taking? Authorizing Provider   levothyroxine (SYNTHROID) 75 MCG tablet Take 1 tablet by mouth every morning (before breakfast) Yes Aimee Frost MD   oxybutynin (DITROPAN-XL) 10 MG extended release tablet Take 1 tablet by mouth at bedtime Yes Aimee Frost MD   omeprazole (PRILOSEC) 20 MG delayed release capsule Take 1 capsule by mouth every morning (before breakfast) Yes Aimee Frost MD   aspirin EC 81 MG EC tablet Take 1 tablet by mouth in the morning and 1 tablet in the evening.  Yes FARIDA Radford   promethazine (PHENERGAN) 25 MG tablet Take 1 tablet by mouth every 8 hours as needed for Nausea Yes FARIDA Radford   cyclobenzaprine (FLEXERIL) 5 MG tablet Take 1 tablet by mouth 2 times daily as needed for Muscle spasms Yes FARIDA Massey   celecoxib (CELEBREX) 200 MG capsule Take 1 capsule by mouth daily as needed for Pain Yes FARIDA Massey   hydrOXYzine HCl (ATARAX) 25 MG tablet Take 1 tablet by mouth 4 times daily as needed for Anxiety Yes Kar Mccord MD   cetirizine (ZYRTEC) 10 MG tablet Take 10 mg by mouth nightly Yes Historical Provider, MD   Glycerin-Polysorbate 80 (REFRESH DRY EYE THERAPY OP) Apply to eye as needed One drop in each eye PRN for dry eyes.  Yes Historical Provider, MD   omeprazole (PRILOSEC OTC) 20 MG tablet Take 1 tablet by mouth daily  Patient taking differently: Take 20 mg by mouth daily as needed Yes Kar Mccord MD   diphenhydrAMINE (BENADRYL) 25 MG tablet Take 25 mg by mouth One HS for allergies Yes Historical Provider, MD   Multiple Vitamins-Minerals (THERAPEUTIC MULTIVITAMIN-MINERALS) tablet Take 1 tablet by mouth daily Yes Historical Provider, MD   Cholecalciferol 50 MCG (2000 UT) TABS Take 2,000 Units by mouth daily Yes Ar Automatic Reconciliation   Amoxicillin 500 MG TABS Take 4 tablets 1 hour prior procedure  FARIDA Massey       CareTeam (Including outside providers/suppliers regularly involved in providing care):   Patient Care Team:  Kar Mccord MD as PCP - General (Family Medicine)  Kar Mccord MD as PCP - [facility] Empaneled Provider     Reviewed and updated this visit:  Tobacco  Allergies  Meds  Med Hx  Surg Hx  Soc Hx  Fam Hx

## 2023-01-09 NOTE — LETTER
[address] [facility] [address]  Phone: [phone]  Fax: [phone]    Tai Guillermo MD        January 9, 2023     Patient: Gold Rosales   YOB: 1950   Date of Visit: 1/9/2023       To Whom It May Concern: It is my medical opinion that Rafy Rodriguez has a weight goal of 175 lbs. .    If you have any questions or concerns, please don't hesitate to call.     Sincerely,        Tai Guillermo MD

## 2023-01-10 LAB
ALBUMIN SERPL-MCNC: 3.9 G/DL (ref 3.2–4.6)
ALBUMIN/GLOB SERPL: 1.1 (ref 0.4–1.6)
ALP SERPL-CCNC: 155 U/L (ref 50–136)
ALT SERPL-CCNC: 22 U/L (ref 12–65)
ANION GAP SERPL CALC-SCNC: 4 MMOL/L (ref 2–11)
AST SERPL-CCNC: 19 U/L (ref 15–37)
BILIRUB SERPL-MCNC: 0.4 MG/DL (ref 0.2–1.1)
BUN SERPL-MCNC: 22 MG/DL (ref 8–23)
CALCIUM SERPL-MCNC: 9.6 MG/DL (ref 8.3–10.4)
CHLORIDE SERPL-SCNC: 108 MMOL/L (ref 101–110)
CHOLEST SERPL-MCNC: 195 MG/DL
CO2 SERPL-SCNC: 28 MMOL/L (ref 21–32)
CREAT SERPL-MCNC: 0.8 MG/DL (ref 0.6–1)
EST. AVERAGE GLUCOSE BLD GHB EST-MCNC: 105 MG/DL
GLOBULIN SER CALC-MCNC: 3.7 G/DL (ref 2.8–4.5)
GLUCOSE SERPL-MCNC: 95 MG/DL (ref 65–100)
HBA1C MFR BLD: 5.3 % (ref 4.8–5.6)
HDLC SERPL-MCNC: 44 MG/DL (ref 40–60)
HDLC SERPL: 4.4
LDLC SERPL CALC-MCNC: 119.8 MG/DL
POTASSIUM SERPL-SCNC: 4.5 MMOL/L (ref 3.5–5.1)
PROT SERPL-MCNC: 7.6 G/DL (ref 6.3–8.2)
SODIUM SERPL-SCNC: 140 MMOL/L (ref 133–143)
TRIGL SERPL-MCNC: 156 MG/DL (ref 35–150)
TSH, 3RD GENERATION: 5.68 UIU/ML (ref 0.36–3.74)
VLDLC SERPL CALC-MCNC: 31.2 MG/DL (ref 6–23)

## 2023-01-11 RX ORDER — LEVOTHYROXINE SODIUM 88 UG/1
88 TABLET ORAL
Qty: 90 TABLET | Refills: 3 | Status: SHIPPED | OUTPATIENT
Start: 2023-01-11

## 2023-01-17 ENCOUNTER — OFFICE VISIT (OUTPATIENT)
Dept: ORTHOPEDIC SURGERY | Age: 73
End: 2023-01-17

## 2023-01-17 DIAGNOSIS — Z96.652 STATUS POST LEFT KNEE REPLACEMENT: Primary | ICD-10-CM

## 2023-01-17 NOTE — PROGRESS NOTES
Post-op TKA Visit      01/17/23     Allergies   Allergen Reactions    Latex Rash    Acyclovir Other (See Comments) and Rash     fever    Seasonal     Adhesive Tape Rash     Current Outpatient Medications on File Prior to Visit   Medication Sig Dispense Refill    levothyroxine (SYNTHROID) 88 MCG tablet Take 1 tablet by mouth every morning (before breakfast) 90 tablet 3    oxybutynin (DITROPAN-XL) 10 MG extended release tablet Take 1 tablet by mouth at bedtime 90 tablet 1    omeprazole (PRILOSEC) 20 MG delayed release capsule Take 1 capsule by mouth every morning (before breakfast) 90 capsule 1    Amoxicillin 500 MG TABS Take 4 tablets 1 hour prior procedure 4 tablet 2    aspirin EC 81 MG EC tablet Take 1 tablet by mouth in the morning and 1 tablet in the evening. 70 tablet 0    promethazine (PHENERGAN) 25 MG tablet Take 1 tablet by mouth every 8 hours as needed for Nausea 30 tablet 1    cyclobenzaprine (FLEXERIL) 5 MG tablet Take 1 tablet by mouth 2 times daily as needed for Muscle spasms 30 tablet 1    celecoxib (CELEBREX) 200 MG capsule Take 1 capsule by mouth daily as needed for Pain 60 capsule 2    hydrOXYzine HCl (ATARAX) 25 MG tablet Take 1 tablet by mouth 4 times daily as needed for Anxiety 120 tablet 1    cetirizine (ZYRTEC) 10 MG tablet Take 10 mg by mouth nightly      Glycerin-Polysorbate 80 (REFRESH DRY EYE THERAPY OP) Apply to eye as needed One drop in each eye PRN for dry eyes. omeprazole (PRILOSEC OTC) 20 MG tablet Take 1 tablet by mouth daily (Patient taking differently: Take 20 mg by mouth daily as needed) 90 tablet 3    diphenhydrAMINE (BENADRYL) 25 MG tablet Take 25 mg by mouth One HS for allergies      Multiple Vitamins-Minerals (THERAPEUTIC MULTIVITAMIN-MINERALS) tablet Take 1 tablet by mouth daily      Cholecalciferol 50 MCG (2000 UT) TABS Take 2,000 Units by mouth daily       No current facility-administered medications on file prior to visit.         5 weeks Status Post left TKA History: The patient returns today for post-op visit following left TKA. Their pain is improving. They are ambulating with a cane as a  walking aid. They have completed home physical therapy and has not started outpatient therapy yet  They are pleased with their results thus far. Denies any new complaints today. Physical Exam:  The patient's incision is well healed. There is moderate swelling and mildly increased warmth. ROM is 0 to 110 degrees. There is no M/L or A/P instability. The calf is soft and non-tender. Neurologic and vascular exams are intact. X-Rays: AP, sunrise and Lateral x-rays of left reveals a cemented TKA, [place] prosthesis. There is stable patella arthroplasty. There is good alignment and good position of the components. X-Ray Diagnosis: Satisfactory appearance left TKA    Disposition: The patient is doing well following left TKA. They will continue physical therapy exercises. The patient was advised about fall precautions and dental prophylaxis. The patient will follow up as scheduled in 5 months or sooner if needed.

## 2023-01-24 ENCOUNTER — HOSPITAL ENCOUNTER (OUTPATIENT)
Dept: PHYSICAL THERAPY | Age: 73
Setting detail: RECURRING SERIES
Discharge: HOME | End: 2023-01-27
Payer: MEDICARE

## 2023-01-24 PROCEDURE — 97140 MANUAL THERAPY 1/> REGIONS: CPT

## 2023-01-24 PROCEDURE — 97161 PT EVAL LOW COMPLEX 20 MIN: CPT

## 2023-01-24 PROCEDURE — 97110 THERAPEUTIC EXERCISES: CPT

## 2023-01-24 NOTE — THERAPY EVALUATION
Sadiq Morrison  : 1950  Primary: Medicare Part A And B (Medicare)  Secondary: Nas Leon @ [address] [address]  Phone: [phone]  Fax: [phone] Plan Frequency: 2x week for 90 days    Plan of Care/Certification Expiration Date: 23      PT Visit Info:  Plan Frequency: 2x week for 90 days  Plan of Care/Certification Expiration Date: 23  Total # of Visits Approved: 99  Total # of Visits to Date: 1  PT Insurance Information: MED A and B / [place]   Progress Note Counter: 1      Visit Count:  1                OUTPATIENT PHYSICAL THERAPY:             OP NOTE TYPE: Initial Assessment 2023               Episode (L TKA) Appt Desk         Treatment Diagnosis:  Pain in Left Knee (M25.562)  Stiffness of Left Knee, Not elsewhere classified (M25.662)  Difficulty in walking, Not elsewhere classified (R26.2)  Generalized Muscle Weakness (M62.81)  Medical/Referring Diagnosis:  Status post left knee replacement [K17.257]  Referring Physician:  True Pham MD MD Orders:  PT Eval and Treat   Return MD Appt:  23  Date of Onset:  Onset Date: 22      Allergies:  Latex, Acyclovir, Seasonal, and Adhesive tape  Restrictions/Precautions:    Restrictions/Precautions: Weight Bearing; Fall Risk  Required Braces or Orthoses?: No  Right Lower Extremity Weight Bearing: Weight Bearing As Tolerated  Left Lower Extremity Weight Bearing: Weight Bearing As Tolerated     Medications Last Reviewed:  2023     SUBJECTIVE   History of Injury/Illness (Reason for Referral):  Pt is s/p L TKA. Pt reports finishing up with HHPT on 23. Pt reports pain has ranged from 2-3/10 to 6/10. Pt was using SPC prior to surgery due to L knee pain but has continued with SPC at this time.  Able to ambulate household distances without AD And SPC for community distances  Patient Stated Goal(s):  \"to be less stiff after surgery\"  Initial:     3 /10 Post Session:     4 /10  Past Medical History/Comorbidities:   Ms. Daja Cabello  has a past medical history of Arrhythmia, Arthritis, Hypertension, Lipedema, Lipedema, Obesity (BMI 30-39.9), and Thyroid disease. Ms. Daja Cabello  has a past surgical history that includes gyn (2007); heent; Urological Surgery; heent (2003); heent (2008, 2009); and Total knee arthroplasty (Left, 12/12/2022). 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   Leisure & Hobbies: yardwork  ADL Assistance: Independent  Homemaking Assistance: Independent  Ambulation Assistance: Independent  Transfer Assistance: Independent        Learning:   Does the patient/guardian have any barriers to learning?: No barriers  Will there be a co-learner?: No  What is the preferred language of the patient/guardian?: English  Is an  required?: No  How does the patient/guardian prefer to learn new concepts?: Listening; Reading; Demonstration; Pictures/Videos     Fall Risk Scale:    Potter Total Score: 39  Potter Fall Risk: High (45 and higher)           OBJECTIVE   Observation:    LE Structure:               Left: Increased edema    Gait: Antalgic L LE, decreased stance time    Assistive Device: SPC          Edema: Increased L    Range Of Motion  Joint: Active Active    Right (Degrees) Left (Degrees)   Hip Flexion     Hip Extension     Hip Adduction     Hip Abduction     Hip Internal Rotation     Hip External Rotation     Knee Flexion 105 degrees  90 degrees Knee Extension 0 degrees  -5 degrees   Ankle Dorsiflexion     Ankle Plantarflexion       Strength  Joint:      RIGHT LEFT   Hip Flexion 5/5 3+/5   Hip Extension NT/5 NT   Hip Internal Rotation 5/5 3/5   Hip External Rotation 5/5 3/5   Hip Abduction NT/5 NT   Hip Adduction NT/5 NT   Knee Flexion 5/5 3+/5   Knee Extension 5/5 5/5     BALANCE Date: 1/24/23 Date:    Right NT    Left NT        Palpation: TTP along lateral aspect of knee, distal ITB  Joint Mobilization: slight patellar hypomobility noted    ASSESSMENT   Initial Assessment:  Ms. Ata Amni presents to physical therapy with s/p L TKA. Pt demonstrating decreased strength, ROM, joint mobility, functional mobility affecting participation in ADLs and functional mobility at this time. Patient will benefit from skilled physical therapy for manual therapeutic techniques (as appropriate), therapeutic exercises and activities, balance and comprehensive home exercises program to address current impairments and functional limitations. Elvin Cartagena will benefit from skilled PT (medically necessary) in order to address above deficits affecting participation in basic ADLs and overall functional tolerance. Problem List: (Impacting functional limitations): Body Structures, Functions, Activity Limitations Requiring Skilled Therapeutic Intervention: Decreased functional mobility ; Decreased ROM; Decreased body mechanics; Decreased tolerance to work activity; Decreased strength; Decreased coordination     Therapy Prognosis:   Therapy Prognosis: Good     Initial Assessment Complexity:   Decision Making: Low Complexity    PLAN   Effective Dates: 1/24/23 TO Plan of Care/Certification Expiration Date: 04/24/23     Frequency/Duration: Plan Frequency: 2x week for 90 days     Interventions Planned (Treatment may consist of any combination of the following):    Current Treatment Recommendations: Balance training; ROM; Strengthening;  Functional mobility training; Endurance training; Gait training; Stair training; Neuromuscular re-education; Manual; Pain management; Home exercise program; Patient/Caregiver education & training; Modalities; Therapeutic activities     Goals: (Goals have been discussed and agreed upon with patient.)  Short-Term Functional Goals: Time Frame: 4 weeks  Felipe Diego will be compliant with HEP. Felipe Diego will report 4/10 pain in order to return to gait without AD and compensations  Felipe Diego will demonstrate 0-100 degrees knee ROM in order to ascend/descend step with reciprocal pattern and without compensations  Felipe Diego will demonstrate 4/5 LE strength in order to improve standing and walking tolerance  Felipe Diego will maintain SLS for up to 15 seconds in order to reduce falls risk  Felipe Diego will score 45/80 on LEFS demonstrating overall improvements in functional mobility  Discharge Goals: Time Frame: 12 weeks  Felipe Diego will be independent with HEP. Felipe Diego will report 2/10 pain in order to return to gait without AD and compensations  Felipe Diego will demonstrate 0-110 degrees knee ROM in order to ascend/descend step with reciprocal pattern and without compensations  Felipe Diego will demonstrate 5/5 LE strength in order to improve standing and walking tolerance  Felipe Diego will maintain SLS for up to 30 seconds in order to reduce falls risk  Felipe Diego will score 60/80 on LEFS demonstrating overall improvements in functional mobility           Outcome Measure: Tool Used: Knee injury and Osteoarthritis Outcome Score for Joint Replacement (KOOS, JR)  Score:  Initial: 9 (Interval: 63.776) 1/24/2023 Most Recent: TBD   Interpretation of Score: The KOOS, JR contains 7 items from the original KOOS survey. Items are coded from 0 to 4, none to extreme respectively.    KOOS, JR is scored by summing the raw response (range 0-28) and then converting it to an interval score using the table provided below. The interval score ranges from 0 to 100 where 0 represents total knee disability and 100 represents perfect knee health. Tool Used: Lower Extremity Functional Scale (LEFS)  Score:  Initial: 35/80 Most Recent: X/80 (Date: -- )   Interpretation of Score: 20 questions each scored on a 5 point scale with 0 representing \"extreme difficulty or unable to perform\" and 4 representing \"no difficulty\". The lower the score, the greater the functional disability. 80/80 represents no disability. Minimal detectable change is 9 points. Medical Necessity:   Skilled intervention continues to be required due to decreased strength, balance, ROM and pain for return to PLOF and functional mobility. Reason For Services/Other Comments:  Patient continues to require skilled intervention due to patient continues to present with impairments assessed at initial evaluation and requiring skilled physical therapy to meet goals for PT. Total Duration:  Time In: 1345  Time Out: 3346    Regarding Salmon De Pere Rik's therapy, I certify that the treatment plan above will be carried out by a therapist or under their direction.   Thank you for this referral,  Jessa Pacheco, PT     Referring Physician Signature: Abi Mcneill., * _______________________________ Date _____________        Post Session Pain  Charge Capture  PT Visit Info MD Kody Miramontes

## 2023-01-24 NOTE — PROGRESS NOTES
Raj Ohs  : 1950  Primary: Medicare Part A And B (Medicare)  Secondary: Nas Leon @ [address] [address]  Phone: [phone]  Fax: [phone] Plan Frequency: 2x week for 90 days    Plan of Care/Certification Expiration Date: 23      PT Visit Info:  Plan Frequency: 2x week for 90 days  Plan of Care/Certification Expiration Date: 23  Total # of Visits Approved: 99  Total # of Visits to Date: 1  PT Insurance Information: MED A and B / [place]   Progress Note Counter: 1      Visit Count:  1    OUTPATIENT PHYSICAL THERAPY:OP NOTE TYPE: Treatment Note 2023       Episode  }Appt Desk             Treatment Diagnosis:    Pain in Left Knee (M25.562)  Stiffness of Left Knee, Not elsewhere classified (M25.662)  Difficulty in walking, Not elsewhere classified (R26.2)  Generalized Muscle Weakness (M62.81)  Medical/Referring Diagnosis:  Status post left knee replacement [L44.695]  Referring Physician:  Dayanara Hernandez MD MD Orders:  PT Eval and Treat   Date of Onset:  Onset Date: 22     Allergies:   Latex, Acyclovir, Seasonal, and Adhesive tape  Restrictions/Precautions:  Restrictions/Precautions: Weight Bearing; Fall Risk  Required Braces or Orthoses?: No  Right Lower Extremity Weight Bearing: Weight Bearing As Tolerated  Left Lower Extremity Weight Bearing: Weight Bearing As Tolerated     Interventions Planned (Treatment may consist of any combination of the following):    Current Treatment Recommendations: Balance training; ROM; Strengthening; Functional mobility training; Endurance training; Gait training; Stair training; Neuromuscular re-education; Manual; Pain management; Home exercise program; Patient/Caregiver education & training; Modalities;  Therapeutic activities     Subjective Comments: Pt is s/p L TKA     Initial:}    3 /10Post Session:       3 /10  Medications Last Reviewed: 1/24/2023  Updated Objective Findings:  See evaluation note from today  Treatment   THERAPEUTIC EXERCISE: (8 minutes):  Exercises per grid below to improve mobility, strength and balance. Required minimal-moderate visual and verbal cues to promote proper body alignment and promote proper body posture. Progressed resistance, range and complexity of movement as indicated. Date:  1/24/23 Date:   Date:     Activity/Exercise Parameters Parameters Parameters   Heel slides HEP, static hold, 10 second 10 X     Quad set HEP, static hold, 10 seconds 10X                                     MANUAL THERAPY: (15 minutes): Joint mobilization, Soft tissue mobilization and Manipulation was utilized and necessary because of the patient's restricted joint motion, painful spasm, loss of articular motion and restricted motion of soft tissue. +STM lateral aspect of knee, distal ITB  +manual stretching L ITB, HS, gastroc  +gentle overpressure PROM knee flexion, extension    (Used abbreviations: MET - muscle energy technique; PNF - proprioceptive neuromuscular facilitation; NMR - neuromuscular re-education; AP - anterior to posterior; PA - posterior to anterior)    MODALITIES: (0 minutes):        none      Treatment/Session Summary:    Treatment Assessment: Verbalized understanding of HEP with static holds. Able to achieve 0 degrees knee extension following stretching and manual techniques. Recommended continued icing at home. Pt verbalized understanding     Communication/Consultation:   on objective findings and HEP  Equipment provided today:  None  Recommendations/Intent for next treatment session: Next visit will focus on Advancements to more challenging activities. Progress repetitions, weight, and complexity of movement per pt tolerance and as indicated.   .    Total Treatment Billable Duration:  35 minutes  Time In: 5403  Time Out: 1420    Francie Middleton, PT       Charge Capture  }Post Session Pain  PT Visit [employer] Portal  MD Guidelines  Scanned Media  Benefits  MyChart    Future Appointments   Date Time Provider Seamus Елена   1/27/2023  3:15 PM Josefina BURR, [facility]   1/31/2023  1:45 PM Josefina BURR, [facility]   2/2/2023  8:45 AM Josefina BURR, [facility]   2/7/2023  1:45 PM Josefina BURR, [facility]   2/10/2023  2:30 PM Josefina BURR, [facility]   2/27/2023  9:15 AM Jovanny Wang MD [facility] AMB   6/20/2023  3:15 PM Aissatou Daniels MD [facility] AMB   7/10/2023  9:45 AM Jovanny Wang MD [facility] AMB

## 2023-01-27 ENCOUNTER — HOSPITAL ENCOUNTER (OUTPATIENT)
Dept: PHYSICAL THERAPY | Age: 73
Setting detail: RECURRING SERIES
Discharge: HOME | End: 2023-01-30
Payer: MEDICARE

## 2023-01-27 PROCEDURE — 97110 THERAPEUTIC EXERCISES: CPT

## 2023-01-27 PROCEDURE — 97140 MANUAL THERAPY 1/> REGIONS: CPT

## 2023-01-27 NOTE — PROGRESS NOTES
Demar Ohdeepak  : 1950  Primary: Medicare Part A And B (Medicare)  Secondary: Nas Leon @ [address] [address]  Phone: [phone]  Fax: [phone] Plan Frequency: 2x week for 90 days    Plan of Care/Certification Expiration Date: 23      PT Visit Info:  Plan Frequency: 2x week for 90 days  Plan of Care/Certification Expiration Date: 23  Total # of Visits Approved: 99  Total # of Visits to Date: 1  PT Insurance Information: MED A and B / [place]   Progress Note Counter: 2      Visit Count:  2    OUTPATIENT PHYSICAL THERAPY:OP NOTE TYPE: Treatment Note 2023       Episode  }Appt Desk             Treatment Diagnosis:    Pain in Left Knee (M25.562)  Stiffness of Left Knee, Not elsewhere classified (M25.662)  Difficulty in walking, Not elsewhere classified (R26.2)  Generalized Muscle Weakness (M62.81)  Medical/Referring Diagnosis:  Status post left knee replacement [O77.574]  Referring Physician:  Justina Natarajan MD MD Orders:  PT Eval and Treat   Date of Onset:  Onset Date: 22     Allergies:   Latex, Acyclovir, Seasonal, and Adhesive tape  Restrictions/Precautions:  Restrictions/Precautions: Weight Bearing; Fall Risk  Required Braces or Orthoses?: No  Right Lower Extremity Weight Bearing: Weight Bearing As Tolerated  Left Lower Extremity Weight Bearing: Weight Bearing As Tolerated     Interventions Planned (Treatment may consist of any combination of the following):    Current Treatment Recommendations: Balance training; ROM; Strengthening; Functional mobility training; Endurance training; Gait training; Stair training; Neuromuscular re-education; Manual; Pain management; Home exercise program; Patient/Caregiver education & training; Modalities; Therapeutic activities     Subjective Comments: Pt reports having difficulty sleeping last night due to B knee LE pain.       Initial:}    3 /10Post Session:       3 /10  Medications Last Reviewed:  1/27/2023  Updated Objective Findings:  None Today  Treatment   THERAPEUTIC EXERCISE: (25 minutes):  Exercises per grid below to improve mobility, strength and balance. Required minimal-moderate visual and verbal cues to promote proper body alignment and promote proper body posture. Progressed resistance, range and complexity of movement as indicated. Date:  1/24/23 Date:  1/27/23 Date:     Activity/Exercise Parameters Parameters Parameters   Heel slides HEP, static hold, 10 second 10 X     Quad set HEP, static hold, 10 seconds 10X     bridges  10 X 2     ASLR  10 X 2     SAQ  10 X 2     clamshell  10 X 2    S/l hip abduction  10 X 2     LAQ  10 X 2    Standing HS curl  10 X 2    Heel raises  10 X 2     SLS  30 seconds 3X    Slantboard stretch  30 seconds 3 X     Tandem balance   30 seconds 2X       MANUAL THERAPY: (15 minutes): Joint mobilization, Soft tissue mobilization and Manipulation was utilized and necessary because of the patient's restricted joint motion, painful spasm, loss of articular motion and restricted motion of soft tissue. +STM lateral aspect of knee, distal ITB  +manual stretching L ITB, HS, gastroc  +gentle overpressure PROM knee flexion, extension    (Used abbreviations: MET - muscle energy technique; PNF - proprioceptive neuromuscular facilitation; NMR - neuromuscular re-education; AP - anterior to posterior; PA - posterior to anterior)    MODALITIES: (0 minutes):        none      Treatment/Session Summary:    Treatment Assessment: Tolerated exercises well. Requiring VC and TC for technique. Mm fatigue due to aquatic workout this morning too. Communication/Consultation:   on objective findings and HEP  Equipment provided today:  None  Recommendations/Intent for next treatment session: Next visit will focus on Advancements to more challenging activities.  Progress repetitions, weight, and complexity of movement per pt tolerance and as indicated. .    Total Treatment Billable Duration: 40 minutes  Time In: 9506  Time Out: 8718    Carla Thomas, PT       Charge Capture  }Post Session Pain  PT Visit Info  [employer] Portal  MD Guidelines  Scanned Media  Benefits  MyChart    Future Appointments   Date Time Provider Seamus Елена   1/31/2023  1:45 PM Josefina Negrete, PT [facility]   2/2/2023  8:45 AM Josefina Negrete, PT [facility]   2/7/2023  1:45 PM Josefina Negrete, [facility]   2/10/2023  2:30 PM Josefina Negrete, [facility]   2/27/2023  9:15 AM Eden Rider MD [facility] AMB   6/20/2023  3:15 PM Harshil Nazario MD [facility] AMB   7/10/2023  9:45 AM Eden Rider MD [facility] AMB

## 2023-01-31 ENCOUNTER — HOSPITAL ENCOUNTER (OUTPATIENT)
Dept: PHYSICAL THERAPY | Age: 73
Setting detail: RECURRING SERIES
Discharge: HOME | End: 2023-02-03
Payer: MEDICARE

## 2023-01-31 PROCEDURE — 97110 THERAPEUTIC EXERCISES: CPT

## 2023-01-31 PROCEDURE — 97140 MANUAL THERAPY 1/> REGIONS: CPT

## 2023-01-31 NOTE — PROGRESS NOTES
Wei Cantu  : 1950  Primary: Medicare Part A And B (Medicare)  Secondary: Nas Leon @ [address] [place] [address]  Phone: [phone]  Fax: [phone] Plan Frequency: 2x week for 90 days    Plan of Care/Certification Expiration Date: 23      PT Visit Info:  Plan Frequency: 2x week for 90 days  Plan of Care/Certification Expiration Date: 23  Total # of Visits Approved: 99  Total # of Visits to Date: 1  PT Insurance Information: MED A and B / [place]   Progress Note Counter: 3      Visit Count:  3    OUTPATIENT PHYSICAL THERAPY:OP NOTE TYPE: Treatment Note 2023       Episode  }Appt Desk             Treatment Diagnosis:    Pain in Left Knee (M25.562)  Stiffness of Left Knee, Not elsewhere classified (M25.662)  Difficulty in walking, Not elsewhere classified (R26.2)  Generalized Muscle Weakness (M62.81)  Medical/Referring Diagnosis:  Status post left knee replacement [A49.729]  Referring Physician:  Rosa Louise MD MD Orders:  PT Eval and Treat   Date of Onset:  Onset Date: 22     Allergies:   Latex, Acyclovir, Seasonal, and Adhesive tape  Restrictions/Precautions:  Restrictions/Precautions: Weight Bearing; Fall Risk  Required Braces or Orthoses?: No  Right Lower Extremity Weight Bearing: Weight Bearing As Tolerated  Left Lower Extremity Weight Bearing: Weight Bearing As Tolerated     Interventions Planned (Treatment may consist of any combination of the following):    Current Treatment Recommendations: Balance training; ROM; Strengthening; Functional mobility training; Endurance training; Gait training; Stair training; Neuromuscular re-education; Manual; Pain management; Home exercise program; Patient/Caregiver education & training; Modalities; Therapeutic activities     Subjective Comments: Pt reports no increased soreness after last visit just more mm fatigue.       Initial:}    -3 /10Post Session:       3 /10  Medications Last Reviewed:  1/31/2023  Updated Objective Findings:  None Today  Treatment   THERAPEUTIC EXERCISE: (23 minutes):  Exercises per grid below to improve mobility, strength and balance. Required minimal-moderate visual and verbal cues to promote proper body alignment and promote proper body posture. Progressed resistance, range and complexity of movement as indicated. Date:  1/24/23 Date:  1/27/23 Date:  1/31/23   Activity/Exercise Parameters Parameters Parameters   Heel slides HEP, static hold, 10 second 10 X     Quad set HEP, static hold, 10 seconds 10X     bridges  10 X 2  10 X 2   ASLR  10 X 2  10 X 2    SAQ  10 X 2  #2 15 X 2    clamshell  10 X 2 15 X 2    S/l hip abduction  10 X 2  10 X 2    LAQ  10 X 2 #2 15 X 2    Standing HS curl  10 X 2 #2 10 X 2   Heel raises  10 X 2  15 X 2   SLS  30 seconds 3X 30 seconds 3 X   Slantboard stretch  30 seconds 3 X     Tandem balance   30 seconds 2X     Gait on track no AD   2 laps     MANUAL THERAPY: (15 minutes): Joint mobilization, Soft tissue mobilization and Manipulation was utilized and necessary because of the patient's restricted joint motion, painful spasm, loss of articular motion and restricted motion of soft tissue. +STM lateral aspect of knee, distal ITB  +manual stretching L ITB, HS, gastroc  +gentle overpressure PROM knee flexion, extension    (Used abbreviations: MET - muscle energy technique; PNF - proprioceptive neuromuscular facilitation; NMR - neuromuscular re-education; AP - anterior to posterior; PA - posterior to anterior)    MODALITIES: (0 minutes):        none      Treatment/Session Summary:    Treatment Assessment: Tolerated progression of exercises well. Continues with TTP along distal aspect of ITB.       Communication/Consultation:   on objective findings and HEP  Equipment provided today:  None  Recommendations/Intent for next treatment session: Next visit will focus on Advancements to more challenging activities. Progress repetitions, weight, and complexity of movement per pt tolerance and as indicated. .    Total Treatment Billable Duration: 38 minutes  Time In: 1300  Time Out: 1338    Mitch Tolliver, PT       Charge Capture  }Post Session Pain  PT Visit Info  [employer] Portal  MD Guidelines  Scanned Media  Benefits  MyChart    Future Appointments   Date Time Provider Seamus Елена   2/2/2023  8:45 AM Keely A. Celine Hamman, PT [facility]   2/7/2023  1:45 PM Keely A. Celine Hamman, PT [facility]   2/10/2023  2:30 PM Keely A. Celine Hamman, [facility] [place]   2/27/2023  9:15 AM Gayathri You MD [facility] AMB   6/20/2023  3:15 PM Cecille Ramirez MD [facility] AMB   7/10/2023  9:45 AM Gayathri You MD [facility] AMB

## 2023-02-02 ENCOUNTER — HOSPITAL ENCOUNTER (OUTPATIENT)
Dept: PHYSICAL THERAPY | Age: 73
Setting detail: RECURRING SERIES
Discharge: HOME | End: 2023-02-05
Payer: MEDICARE

## 2023-02-02 PROCEDURE — 97110 THERAPEUTIC EXERCISES: CPT

## 2023-02-02 PROCEDURE — 97140 MANUAL THERAPY 1/> REGIONS: CPT

## 2023-02-02 NOTE — PROGRESS NOTES
Caterina Romel  : 1950  Primary: Medicare Part A And B (Medicare)  Secondary: Nas Leon @ [address] [address]  Phone: [phone]  Fax: [phone] Plan Frequency: 2x week for 90 days    Plan of Care/Certification Expiration Date: 23      PT Visit Info:  Plan Frequency: 2x week for 90 days  Plan of Care/Certification Expiration Date: 23  Total # of Visits Approved: 99  Total # of Visits to Date: 1  PT Insurance Information: MED A and B / [place]   Progress Note Counter: 4      Visit Count:  4    OUTPATIENT PHYSICAL THERAPY:OP NOTE TYPE: Treatment Note 2023       Episode  }Appt Desk             Treatment Diagnosis:    Pain in Left Knee (M25.562)  Stiffness of Left Knee, Not elsewhere classified (M25.662)  Difficulty in walking, Not elsewhere classified (R26.2)  Generalized Muscle Weakness (M62.81)  Medical/Referring Diagnosis:  Status post left knee replacement [F11.926]  Referring Physician:  Edison Leal MD MD Orders:  PT Eval and Treat   Date of Onset:  Onset Date: 22     Allergies:   Latex, Acyclovir, Seasonal, and Adhesive tape  Restrictions/Precautions:  Restrictions/Precautions: Weight Bearing; Fall Risk  Required Braces or Orthoses?: No  Right Lower Extremity Weight Bearing: Weight Bearing As Tolerated  Left Lower Extremity Weight Bearing: Weight Bearing As Tolerated     Interventions Planned (Treatment may consist of any combination of the following):    Current Treatment Recommendations: Balance training; ROM; Strengthening; Functional mobility training; Endurance training; Gait training; Stair training; Neuromuscular re-education; Manual; Pain management; Home exercise program; Patient/Caregiver education & training; Modalities; Therapeutic activities     Subjective Comments: Pt reports being very sore after last visit.       Initial:}   4 /10Post Session:       4 /10  Medications Last Reviewed:  2/2/2023  Updated Objective Findings:  None Today  Treatment   THERAPEUTIC EXERCISE: (25 minutes):  Exercises per grid below to improve mobility, strength and balance. Required minimal-moderate visual and verbal cues to promote proper body alignment and promote proper body posture. Progressed resistance, range and complexity of movement as indicated. Date:  1/24/23 Date:  1/27/23 Date:  1/31/23 Date:  2/2/23   Activity/Exercise Parameters Parameters Parameters    Heel slides HEP, static hold, 10 second 10 X      Quad set HEP, static hold, 10 seconds 10X      bridges  10 X 2  10 X 2 10 X 2   ASLR  10 X 2  10 X 2  10 X 2   SAQ  10 X 2  #2 15 X 2  #2 15 X 2   clamshell  10 X 2 15 X 2  15 X 2    S/l hip abduction  10 X 2  10 X 2  10 X 2    LAQ  10 X 2 #2 15 X 2  #2 15 X 2    Standing HS curl  10 X 2 #2 10 X 2 #2 10 X 2   Heel raises  10 X 2  15 X 2 15 X 2   SLS  30 seconds 3X 30 seconds 3 X 30 seconds 3 X    Slantboard stretch  30 seconds 3 X   30 seconds 3X    Tandem balance   30 seconds 2X   30 seconds 3 X    Gait on track no AD   2 laps    Step ups     4\" 10 X 1 B UE support; 10 X 1 1 UE support            MANUAL THERAPY: (15 minutes): Joint mobilization, Soft tissue mobilization and Manipulation was utilized and necessary because of the patient's restricted joint motion, painful spasm, loss of articular motion and restricted motion of soft tissue. +STM lateral aspect of knee, distal ITB  +manual stretching L ITB, HS, gastroc  +gentle overpressure PROM knee flexion, extension    (Used abbreviations: MET - muscle energy technique; PNF - proprioceptive neuromuscular facilitation; NMR - neuromuscular re-education; AP - anterior to posterior; PA - posterior to anterior)    MODALITIES: (0 minutes):        none      Treatment/Session Summary:    Treatment Assessment: Did not progress exercises this visit due to increased soreness after last.  Continues with TTP along distal aspect of ITB.  Making slow steady progress. Added in step ups this visit     Communication/Consultation:   on objective findings and HEP  Equipment provided today:  None  Recommendations/Intent for next treatment session: Next visit will focus on Advancements to more challenging activities. Progress repetitions, weight, and complexity of movement per pt tolerance and as indicated. .    Total Treatment Billable Duration: 40 minutes  Time In: 3311  Time Out: 1568    Winnie Farr, PT       Charge Capture  }Post Session Pain  PT Visit [address] Portal  MD Guidelines  Scanned Media  Benefits  MyChart    Future Appointments   Date Time Provider Seamus Christiansen   2/7/2023  1:45 PM Josefina Corrigan, PT [facility]   2/10/2023  2:30 PM Josefina Corrigan, [address]   2/27/2023  9:15 AM MD LING SkeltonF [facility] AMB   6/20/2023  3:15 PM Odin Dykes MD [facility] AMB   7/10/2023  9:45 AM Orlando Fisher MD [facility] AMB

## 2023-02-07 ENCOUNTER — HOSPITAL ENCOUNTER (OUTPATIENT)
Dept: PHYSICAL THERAPY | Age: 73
Setting detail: RECURRING SERIES
Discharge: HOME | End: 2023-02-10
Payer: MEDICARE

## 2023-02-07 PROCEDURE — 97140 MANUAL THERAPY 1/> REGIONS: CPT

## 2023-02-07 PROCEDURE — 97110 THERAPEUTIC EXERCISES: CPT

## 2023-02-07 NOTE — PROGRESS NOTES
Angelica Larson  : 1950  Primary: Medicare Part A And B (Medicare)  Secondary: Nas Leon @ [address] [address]  Phone: [phone]  Fax: [phone] Plan Frequency: 2x week for 90 days    Plan of Care/Certification Expiration Date: 23      PT Visit Info:  Plan Frequency: 2x week for 90 days  Plan of Care/Certification Expiration Date: 23  Total # of Visits Approved: 99  Total # of Visits to Date: 1  PT Insurance Information: MED A and B / [place]   Progress Note Counter: 5      Visit Count:  5    OUTPATIENT PHYSICAL THERAPY:OP NOTE TYPE: Treatment Note 2023       Episode  }Appt Desk             Treatment Diagnosis:    Pain in Left Knee (M25.562)  Stiffness of Left Knee, Not elsewhere classified (M25.662)  Difficulty in walking, Not elsewhere classified (R26.2)  Generalized Muscle Weakness (M62.81)  Medical/Referring Diagnosis:  Status post left knee replacement [F02.100]  Referring Physician:  Erica Virgen MD MD Orders:  PT Eval and Treat   Date of Onset:  Onset Date: 22     Allergies:   Latex, Acyclovir, Seasonal, and Adhesive tape  Restrictions/Precautions:  Restrictions/Precautions: Weight Bearing; Fall Risk  Required Braces or Orthoses?: No  Right Lower Extremity Weight Bearing: Weight Bearing As Tolerated  Left Lower Extremity Weight Bearing: Weight Bearing As Tolerated     Interventions Planned (Treatment may consist of any combination of the following):    Current Treatment Recommendations: Balance training; ROM; Strengthening; Functional mobility training; Endurance training; Gait training; Stair training; Neuromuscular re-education; Manual; Pain management; Home exercise program; Patient/Caregiver education & training; Modalities; Therapeutic activities     Subjective Comments: Pt reports still being fairly sore after last visit. Has been walking more without cane.      Initial:}  3 /10Post Session:       3 /10  Medications Last Reviewed:  2/7/2023  Updated Objective Findings:  None Today  Treatment   THERAPEUTIC EXERCISE: (25 minutes):  Exercises per grid below to improve mobility, strength and balance. Required minimal-moderate visual and verbal cues to promote proper body alignment and promote proper body posture. Progressed resistance, range and complexity of movement as indicated. Date:  1/24/23 Date:  1/27/23 Date:  1/31/23 Date:  2/2/23 Date:  2/7/23   Activity/Exercise Parameters Parameters Parameters     Heel slides HEP, static hold, 10 second 10 X       Quad set HEP, static hold, 10 seconds 10X       bridges  10 X 2  10 X 2 10 X 2 15 X 2   ASLR  10 X 2  10 X 2  10 X 2 10 X 2    SAQ  10 X 2  #2 15 X 2  #2 15 X 2 #2 15 X 2   clamshell  10 X 2 15 X 2  15 X 2  15 X 2    S/l hip abduction  10 X 2  10 X 2  10 X 2  10 X 2    LAQ  10 X 2 #2 15 X 2  #2 15 X 2  #2 15 X 2    Standing HS curl  10 X 2 #2 10 X 2 #2 10 X 2 #2 10 X 2    Heel raises  10 X 2  15 X 2 15 X 2 15 X 2    SLS  30 seconds 3X 30 seconds 3 X 30 seconds 3 X  30 seconds 3 x   Slantboard stretch  30 seconds 3 X   30 seconds 3X  30 seconds 3 X    Tandem balance   30 seconds 2X   30 seconds 3 X  30 seconds 3X   Gait on track no AD   2 laps     Step ups     4\" 10 X 1 B UE support; 10 X 1 1 UE support 4\" 10 X 2 1 UE support              MANUAL THERAPY: (15 minutes): Joint mobilization, Soft tissue mobilization and Manipulation was utilized and necessary because of the patient's restricted joint motion, painful spasm, loss of articular motion and restricted motion of soft tissue.    +STM lateral aspect of knee, distal ITB  +manual stretching L ITB, HS, gastroc  +gentle overpressure PROM knee flexion, extension    (Used abbreviations: MET - muscle energy technique; PNF - proprioceptive neuromuscular facilitation; NMR - neuromuscular re-education; AP - anterior to posterior; PA - posterior to anterior)    MODALITIES: (0 minutes): none      Treatment/Session Summary:    Treatment Assessment: Did not add any increased resistance this visit just increased repetitions due to soreness after last. Pt demonstrating improvements in L knee flexion ROM. Communication/Consultation:   on objective findings and HEP  Equipment provided today:  None  Recommendations/Intent for next treatment session: Next visit will focus on Advancements to more challenging activities. Progress repetitions, weight, and complexity of movement per pt tolerance and as indicated. .    Total Treatment Billable Duration: 40 minutes  Time In: 3830  Time Out: 411 [facility], PT       Charge Capture  }Post Session Pain  PT Visit Info  [employer] Portal  MD Guidelines  Scanned Media  Benefits  MyChart    Future Appointments   Date Time Provider Seamus Christiansen   2/10/2023  2:30 PM Josefina BURR [facility]   2/27/2023  9:15 AM Tristan Hahn MD [facility] AMB   6/20/2023  3:15 PM Raphael Mccormick MD [facility] AMB   7/10/2023  9:45 AM [facility]

## 2023-02-08 ENCOUNTER — PATIENT MESSAGE (OUTPATIENT)
Dept: FAMILY MEDICINE CLINIC | Facility: CLINIC | Age: 73
End: 2023-02-08

## 2023-02-09 NOTE — TELEPHONE ENCOUNTER
From: Susan Hall  To: Dr. Negro Quiles  Sent: 2/8/2023 9:20 PM EST  Subject: Sore nipple     Sunday morning I woke with a very sore left nipple. The breast was not sore. It stayed sore Sunday and Monday and Monday night I noticed a white speck so I squeezed the nipple and white stuff came out. I never had this before, wondering if it was a cyst. It's not sore now.

## 2023-02-10 ENCOUNTER — HOSPITAL ENCOUNTER (OUTPATIENT)
Dept: PHYSICAL THERAPY | Age: 73
Setting detail: RECURRING SERIES
Discharge: HOME | End: 2023-02-13
Payer: MEDICARE

## 2023-02-10 PROCEDURE — 97140 MANUAL THERAPY 1/> REGIONS: CPT

## 2023-02-10 PROCEDURE — 97110 THERAPEUTIC EXERCISES: CPT

## 2023-02-10 NOTE — PROGRESS NOTES
Panchito Hernandez  : 1950  Primary: Medicare Part A And B (Medicare)  Secondary: Nas Leon @ [address] [place] [address]  Phone: [phone]  Fax: [phone] Plan Frequency: 2x week for 90 days    Plan of Care/Certification Expiration Date: 23      PT Visit Info:  Plan Frequency: 2x week for 90 days  Plan of Care/Certification Expiration Date: 23  Total # of Visits Approved: 99  Total # of Visits to Date: 6  PT Insurance Information: MED A and B / [employer]   Progress Note Counter: 6      Visit Count:  6    OUTPATIENT PHYSICAL THERAPY:OP NOTE TYPE: Treatment Note 2/10/2023       Episode  }Appt Desk             Treatment Diagnosis:    Pain in Left Knee (M25.562)  Stiffness of Left Knee, Not elsewhere classified (M25.662)  Difficulty in walking, Not elsewhere classified (R26.2)  Generalized Muscle Weakness (M62.81)  Medical/Referring Diagnosis:  Status post left knee replacement [X32.902]  Referring Physician:  Glynn Ramires MD MD Orders:  PT Eval and Treat   Date of Onset:  Onset Date: 22     Allergies:   Latex, Acyclovir, Seasonal, and Adhesive tape  Restrictions/Precautions:  Restrictions/Precautions: Weight Bearing; Fall Risk  Required Braces or Orthoses?: No  Right Lower Extremity Weight Bearing: Weight Bearing As Tolerated  Left Lower Extremity Weight Bearing: Weight Bearing As Tolerated     Interventions Planned (Treatment may consist of any combination of the following):    Current Treatment Recommendations: Balance training; ROM; Strengthening; Functional mobility training; Endurance training; Gait training; Stair training; Neuromuscular re-education; Manual; Pain management; Home exercise program; Patient/Caregiver education & training; Modalities; Therapeutic activities     Subjective Comments: Pt arrives today with no SPC slow but steady gait pattern.  Pt reports being achy at both knees since yesterday and into today. Initial:}  4  /10Post Session:       4 /10  Medications Last Reviewed:  2/10/2023  Updated Objective Findings:  None Today  Treatment   THERAPEUTIC EXERCISE: (25 minutes):  Exercises per grid below to improve mobility, strength and balance. Required minimal-moderate visual and verbal cues to promote proper body alignment and promote proper body posture. Progressed resistance, range and complexity of movement as indicated. Date:  1/24/23 Date:  1/27/23 Date:  1/31/23 Date:  2/2/23 Date:  2/7/23 Date:  2/10/23   Activity/Exercise Parameters Parameters Parameters      Heel slides HEP, static hold, 10 second 10 X        Quad set HEP, static hold, 10 seconds 10X        bridges  10 X 2  10 X 2 10 X 2 15 X 2 15 X 2   ASLR  10 X 2  10 X 2  10 X 2 10 X 2  10 X 3    SAQ  10 X 2  #2 15 X 2  #2 15 X 2 #2 15 X 2 #2 15 X 2   clamshell  10 X 2 15 X 2  15 X 2  15 X 2  15 X 2   S/l hip abduction  10 X 2  10 X 2  10 X 2  10 X 2  10 X 2    LAQ  10 X 2 #2 15 X 2  #2 15 X 2  #2 15 X 2  #2 15 X 2    Standing HS curl  10 X 2 #2 10 X 2 #2 10 X 2 #2 10 X 2  #2 10 X 2    Heel raises  10 X 2  15 X 2 15 X 2 15 X 2  15 X 2    SLS  30 seconds 3X 30 seconds 3 X 30 seconds 3 X  30 seconds 3 x 30 seconds 3X   Slantboard stretch  30 seconds 3 X   30 seconds 3X  30 seconds 3 X  30 seconds 3X   Tandem balance   30 seconds 2X   30 seconds 3 X  30 seconds 3X Foam 30 seconds 3 X   Gait on track no AD   2 laps      Step ups     4\" 10 X 1 B UE support; 10 X 1 1 UE support 4\" 10 X 2 1 UE support  6\" 10 X 2 no UE support   Forward tap downs           MANUAL THERAPY: (15 minutes): Joint mobilization, Soft tissue mobilization and Manipulation was utilized and necessary because of the patient's restricted joint motion, painful spasm, loss of articular motion and restricted motion of soft tissue.    +STM lateral aspect of knee, distal ITB  +manual stretching L ITB, HS, gastroc  +gentle overpressure PROM knee flexion, extension    (Used abbreviations: MET - muscle energy technique; PNF - proprioceptive neuromuscular facilitation; NMR - neuromuscular re-education; AP - anterior to posterior; PA - posterior to anterior)    MODALITIES: (0 minutes):        none      Treatment/Session Summary:    Treatment Assessment: Did not increased resistance this visit due to more \"aching\" today. Able to advance to foam with tandem balance this visit. Will continue efforts. Communication/Consultation:   on objective findings and HEP  Equipment provided today:  None  Recommendations/Intent for next treatment session: Next visit will focus on Advancements to more challenging activities. Progress repetitions, weight, and complexity of movement per pt tolerance and as indicated. .    Total Treatment Billable Duration: 42 minutes  Time In: 7248  Time Out: 1510    Marni Guzman, PT       Charge Capture  }Post Session Pain  PT Visit Info  [facility] Portal  MD Guidelines  Scanned Media  Benefits  MyChart    Future Appointments   Date Time Provider Seamus Christiansen   2/13/2023  3:15 PM Josefina BURR, [facility]   2/16/2023  1:45 PM Josefina BURR, PT [facility]   2/21/2023  2:30 PM Josefina BURR, [facility]   2/23/2023  2:30 PM Josefina BURR, [facility]   2/27/2023  9:15 AM MD YANNI Lee [facility] AMB   6/20/2023  3:15 PM Mahin Jackson MD [facility] AMB   7/10/2023  9:45 AM Kathe Combs MD [facility] AMB

## 2023-02-13 ENCOUNTER — HOSPITAL ENCOUNTER (OUTPATIENT)
Dept: PHYSICAL THERAPY | Age: 73
Setting detail: RECURRING SERIES
Discharge: HOME | End: 2023-02-16
Payer: MEDICARE

## 2023-02-13 PROCEDURE — 97140 MANUAL THERAPY 1/> REGIONS: CPT

## 2023-02-13 PROCEDURE — 97110 THERAPEUTIC EXERCISES: CPT

## 2023-02-13 NOTE — PROGRESS NOTES
Vivek Chamorro  : 1950  Primary: Medicare Part A And B (Medicare)  Secondary: Nas Leon @ [facility]  [address] [address] [facility] [address]  Phone: [phone]  Fax: [phone] Plan Frequency: 2x week for 90 days    Plan of Care/Certification Expiration Date: 23      PT Visit Info:  Plan Frequency: 2x week for 90 days  Plan of Care/Certification Expiration Date: 23  Total # of Visits Approved: 99  Total # of Visits to Date: 7  PT Insurance Information: MED A and B / [place]   Progress Note Counter: 7      Visit Count:  7    OUTPATIENT PHYSICAL THERAPY:OP NOTE TYPE: Treatment Note 2023       Episode  }Appt Desk             Treatment Diagnosis:    Pain in Left Knee (M25.562)  Stiffness of Left Knee, Not elsewhere classified (M25.662)  Difficulty in walking, Not elsewhere classified (R26.2)  Generalized Muscle Weakness (M62.81)  Medical/Referring Diagnosis:  Status post left knee replacement [E17.963]  Referring Physician:  Jeanine Hurt MD MD Orders:  PT Eval and Treat   Date of Onset:  Onset Date: 22     Allergies:   Latex, Acyclovir, Seasonal, and Adhesive tape  Restrictions/Precautions:  Restrictions/Precautions: Weight Bearing; Fall Risk  Required Braces or Orthoses?: No  Right Lower Extremity Weight Bearing: Weight Bearing As Tolerated  Left Lower Extremity Weight Bearing: Weight Bearing As Tolerated     Interventions Planned (Treatment may consist of any combination of the following):    Current Treatment Recommendations: Balance training; ROM; Strengthening; Functional mobility training; Endurance training; Gait training; Stair training; Neuromuscular re-education; Manual; Pain management; Home exercise program; Patient/Caregiver education & training; Modalities; Therapeutic activities     Subjective Comments: Pt arrives today with increased pain and soreness at knee this visit.      Initial:}  4  /10Post Session: 4 /10  Medications Last Reviewed:  2/13/2023  Updated Objective Findings:  None Today  Treatment   THERAPEUTIC EXERCISE: (25 minutes):  Exercises per grid below to improve mobility, strength and balance. Required minimal-moderate visual and verbal cues to promote proper body alignment and promote proper body posture. Progressed resistance, range and complexity of movement as indicated. Date:  1/24/23 Date:  1/27/23 Date:  1/31/23 Date:  2/2/23 Date:  2/7/23 Date:  2/10/23 Date:  2/13/23   Activity/Exercise Parameters Parameters Parameters       Heel slides HEP, static hold, 10 second 10 X         Quad set HEP, static hold, 10 seconds 10X         bridges  10 X 2  10 X 2 10 X 2 15 X 2 15 X 2 15 X 3    ASLR  10 X 2  10 X 2  10 X 2 10 X 2  10 X 3  10 X 3    SAQ  10 X 2  #2 15 X 2  #2 15 X 2 #2 15 X 2 #2 15 X 2 #2 15 X 2    clamshell  10 X 2 15 X 2  15 X 2  15 X 2  15 X 2 15 X 2    S/l hip abduction  10 X 2  10 X 2  10 X 2  10 X 2  10 X 2  10 X 2    LAQ  10 X 2 #2 15 X 2  #2 15 X 2  #2 15 X 2  #2 15 X 2  #2 15 X 2    Standing HS curl  10 X 2 #2 10 X 2 #2 10 X 2 #2 10 X 2  #2 10 X 2  #2 15 X 2    Heel raises  10 X 2  15 X 2 15 X 2 15 X 2  15 X 2  15 X 2    SLS  30 seconds 3X 30 seconds 3 X 30 seconds 3 X  30 seconds 3 x 30 seconds 3X 30 seconds 3X   Slantboard stretch  30 seconds 3 X   30 seconds 3X  30 seconds 3 X  30 seconds 3X 30 seconds 3 X    Tandem balance   30 seconds 2X   30 seconds 3 X  30 seconds 3X Foam 30 seconds 3 X Foam 30 seconds 3 X    Gait on track no AD   2 laps       Step ups     4\" 10 X 1 B UE support; 10 X 1 1 UE support 4\" 10 X 2 1 UE support  6\" 10 X 2 no UE support 6\" 10 X 2 no UE support   Forward tap downs            MANUAL THERAPY: (15 minutes): Joint mobilization, Soft tissue mobilization and Manipulation was utilized and necessary because of the patient's restricted joint motion, painful spasm, loss of articular motion and restricted motion of soft tissue.    +STM lateral aspect of knee, distal ITB  +manual stretching L ITB, HS, gastroc  +gentle overpressure PROM knee flexion, extension    (Used abbreviations: MET - muscle energy technique; PNF - proprioceptive neuromuscular facilitation; NMR - neuromuscular re-education; AP - anterior to posterior; PA - posterior to anterior)    MODALITIES: (0 minutes):        none      Treatment/Session Summary:    Treatment Assessment: Pt tolerated session well despite increased mm fatigue from aquatics workout this morning. Improving knee flexion ROM noted. Communication/Consultation:   on objective findings and HEP  Equipment provided today:  None  Recommendations/Intent for next treatment session: Next visit will focus on Advancements to more challenging activities. Progress repetitions, weight, and complexity of movement per pt tolerance and as indicated. .    Total Treatment Billable Duration: 40 minutes  Time In: 2313  Time Out: 7003    Vickii Hands, PT       Charge Capture  }Post Session Pain  PT Visit Info  [facility] Portal  MD Guidelines  Scanned Media  Benefits  MyChart    Future Appointments   Date Time Provider Seamus Елена   2/16/2023  1:45 PM Josefina Curry, [facility]   2/21/2023  2:30 PM Josefina Curry, [facility]   2/23/2023  2:30 PM Josefina Curry, [facility]   2/27/2023  9:15 AM Rupesh Clifford MD [facility] AMB   6/20/2023  3:15 PM Joselito Lee MD [facility] AMB   7/10/2023  9:45 AM Rupesh Clifford MD [facility] AMB

## 2023-02-16 ENCOUNTER — HOSPITAL ENCOUNTER (OUTPATIENT)
Dept: PHYSICAL THERAPY | Age: 73
Setting detail: RECURRING SERIES
Discharge: HOME | End: 2023-02-19
Payer: MEDICARE

## 2023-02-16 PROCEDURE — 97110 THERAPEUTIC EXERCISES: CPT

## 2023-02-16 PROCEDURE — 97140 MANUAL THERAPY 1/> REGIONS: CPT

## 2023-02-16 NOTE — PROGRESS NOTES
Evelin Perez  : 1950  Primary: Medicare Part A And B (Medicare)  Secondary: Nas Leon @ [address]  [address]  Phone: [phone]  Fax: [phone] Plan Frequency: 2x week for 90 days    Plan of Care/Certification Expiration Date: 23      PT Visit Info:  Plan Frequency: 2x week for 90 days  Plan of Care/Certification Expiration Date: 23  Total # of Visits Approved: 99  Total # of Visits to Date: 8  PT Insurance Information: MED A and B / [place]   Progress Note Counter: 8      Visit Count:  8    OUTPATIENT PHYSICAL THERAPY:OP NOTE TYPE: Treatment Note 2023       Episode  }Appt Desk             Treatment Diagnosis:    Pain in Left Knee (M25.562)  Stiffness of Left Knee, Not elsewhere classified (M25.662)  Difficulty in walking, Not elsewhere classified (R26.2)  Generalized Muscle Weakness (M62.81)  Medical/Referring Diagnosis:  Status post left knee replacement [S43.827]  Referring Physician:  Oseas Vasques MD MD Orders:  PT Eval and Treat   Date of Onset:  Onset Date: 22     Allergies:   Latex, Acyclovir, Seasonal, and Adhesive tape  Restrictions/Precautions:  Restrictions/Precautions: Weight Bearing; Fall Risk  Required Braces or Orthoses?: No  Right Lower Extremity Weight Bearing: Weight Bearing As Tolerated  Left Lower Extremity Weight Bearing: Weight Bearing As Tolerated     Interventions Planned (Treatment may consist of any combination of the following):    Current Treatment Recommendations: Balance training; ROM; Strengthening; Functional mobility training; Endurance training; Gait training; Stair training; Neuromuscular re-education; Manual; Pain management; Home exercise program; Patient/Caregiver education & training; Modalities; Therapeutic activities     Subjective Comments: Pt reports having increased pain at hamstring region today that started this afternoon.      Initial:}  5 /10Post Session:       3 /10  Medications Last Reviewed:  2/16/2023  Updated Objective Findings:  None Today  Treatment   THERAPEUTIC EXERCISE: (25 minutes):  Exercises per grid below to improve mobility, strength and balance. Required minimal-moderate visual and verbal cues to promote proper body alignment and promote proper body posture. Progressed resistance, range and complexity of movement as indicated. Date:  2/7/23 Date:  2/10/23 Date:  2/13/23 Date:  2/16/23   Activity/Exercise       Heel slides       Quad set       bridges 15 X 2 15 X 2 15 X 3  15 X 2    ASLR 10 X 2  10 X 3  10 X 3  #2 10 X 2    SAQ #2 15 X 2 #2 15 X 2 #2 15 X 2  #3 10 X 3    clamshell 15 X 2  15 X 2 15 X 2  15 X 2    S/l hip abduction 10 X 2  10 X 2  10 X 2  #2 10 X 2    LAQ #2 15 X 2  #2 15 X 2  #2 15 X 2  #3 10 X 3    Standing HS curl #2 10 X 2  #2 10 X 2  #2 15 X 2     Heel raises 15 X 2  15 X 2  15 X 2  15 X 2    SLS 30 seconds 3 x 30 seconds 3X 30 seconds 3X 30 seconds 3X    Slantboard stretch 30 seconds 3 X  30 seconds 3X 30 seconds 3 X  30 seconds 3 X    Tandem balance  30 seconds 3X Foam 30 seconds 3 X Foam 30 seconds 3 X     Gait on track no AD       Step ups  4\" 10 X 2 1 UE support  6\" 10 X 2 no UE support 6\" 10 X 2 no UE support 6\" 10 X 2 no UE support   Forward tap downs         MANUAL THERAPY: (15 minutes): Joint mobilization, Soft tissue mobilization and Manipulation was utilized and necessary because of the patient's restricted joint motion, painful spasm, loss of articular motion and restricted motion of soft tissue.    +STM lateral aspect of knee, distal ITB  +manual stretching L ITB, HS, gastroc  +gentle overpressure PROM knee flexion, extension    (Used abbreviations: MET - muscle energy technique; PNF - proprioceptive neuromuscular facilitation; NMR - neuromuscular re-education; AP - anterior to posterior; PA - posterior to anterior)    MODALITIES: (0 minutes):        none      Treatment/Session Summary: Treatment Assessment: Pt tolerated progression of exercises well. TTP along proximal gastroc this visit. Continue to monitor. Demonstrating  Communication/Consultation:   on objective findings and HEP  Equipment provided today:  None  Recommendations/Intent for next treatment session: Next visit will focus on Advancements to more challenging activities. Progress repetitions, weight, and complexity of movement per pt tolerance and as indicated. .    Total Treatment Billable Duration: 40 minutes  Time In: 1573  Time Out: 411 [facility], PT       Charge Capture  }Post Session Pain  PT Visit Info  [employer] Portal  MD Guidelines  Scanned Media  Benefits  MyChart    Future Appointments   Date Time Provider Seamus Christiansen   2/21/2023  2:30 PM Josefina Farris, PT [facility]   2/23/2023  2:30 PM Josefina Farris, PT [facility] SFD   2/25/2023  3:00 PM SFE [facility] ROOM 4 SFERM SFE   2/27/2023  9:15 AM Christie Galdamez MD [facility] AMB   6/20/2023  3:15 PM Jenifer Sargent MD [facility] GVL AMB   7/10/2023  9:45 AM Christie Galdamez MD PV[facility] AMB

## 2023-02-21 ENCOUNTER — HOSPITAL ENCOUNTER (OUTPATIENT)
Dept: PHYSICAL THERAPY | Age: 73
Setting detail: RECURRING SERIES
Discharge: HOME | End: 2023-02-24
Payer: MEDICARE

## 2023-02-21 PROCEDURE — 97110 THERAPEUTIC EXERCISES: CPT

## 2023-02-21 PROCEDURE — 97140 MANUAL THERAPY 1/> REGIONS: CPT

## 2023-02-21 NOTE — PROGRESS NOTES
Merced Vargas  : 1950  Primary: Medicare Part A And B (Medicare)  Secondary: Nas Leon @ [address] [place] [address]  Phone: [phone]  Fax: [phone] Plan Frequency: 2x week for 90 days    Plan of Care/Certification Expiration Date: 23      PT Visit Info:  Plan Frequency: 2x week for 90 days  Plan of Care/Certification Expiration Date: 23  Total # of Visits Approved: 99  Total # of Visits to Date: 5  PT Insurance Information: MED A and B / [place]   Progress Note Counter: 9      Visit Count:  9    OUTPATIENT PHYSICAL THERAPY:OP NOTE TYPE: Treatment Note 2023       Episode  }Appt Desk             Treatment Diagnosis:    Pain in Left Knee (M25.562)  Stiffness of Left Knee, Not elsewhere classified (M25.662)  Difficulty in walking, Not elsewhere classified (R26.2)  Generalized Muscle Weakness (M62.81)  Medical/Referring Diagnosis:  Status post left knee replacement [S10.645]  Referring Physician:  Severa Catchings., MD MD Orders:  PT Eval and Treat   Date of Onset:  Onset Date: 22     Allergies:   Latex, Acyclovir, Seasonal, and Adhesive tape  Restrictions/Precautions:  Restrictions/Precautions: Weight Bearing; Fall Risk  Required Braces or Orthoses?: No  Right Lower Extremity Weight Bearing: Weight Bearing As Tolerated  Left Lower Extremity Weight Bearing: Weight Bearing As Tolerated     Interventions Planned (Treatment may consist of any combination of the following):    Current Treatment Recommendations: Balance training; ROM; Strengthening; Functional mobility training; Endurance training; Gait training; Stair training; Neuromuscular re-education; Manual; Pain management; Home exercise program; Patient/Caregiver education & training; Modalities; Therapeutic activities     Subjective Comments: Pt reports feeling much better today. No pain currently.  Unsure if she wants to continue PT after this week.     Initial:}  0  /10Post Session:       2 /10  Medications Last Reviewed:  2/21/2023  Updated Objective Findings:  None Today  Treatment   THERAPEUTIC EXERCISE: (23 minutes):  Exercises per grid below to improve mobility, strength and balance. Required minimal-moderate visual and verbal cues to promote proper body alignment and promote proper body posture. Progressed resistance, range and complexity of movement as indicated. Date:  2/7/23 Date:  2/10/23 Date:  2/13/23 Date:  2/16/23 Date:  2/21/23   Activity/Exercise        Heel slides        Quad set        bridges 15 X 2 15 X 2 15 X 3  15 X 2  10 X 3   ASLR 10 X 2  10 X 3  10 X 3  #2 10 X 2  #2 10 X 3    SAQ #2 15 X 2 #2 15 X 2 #2 15 X 2  #3 10 X 3  #3 10 X 3    clamshell 15 X 2  15 X 2 15 X 2  15 X 2  10 X 3   S/l hip abduction 10 X 2  10 X 2  10 X 2  #2 10 X 2  #2 10 X 2    LAQ #2 15 X 2  #2 15 X 2  #2 15 X 2  #3 10 X 3  #3 10 X 32   Standing HS curl #2 10 X 2  #2 10 X 2  #2 15 X 2      Heel raises 15 X 2  15 X 2  15 X 2  15 X 2  10 X 3    SLS 30 seconds 3 x 30 seconds 3X 30 seconds 3X 30 seconds 3X     Slantboard stretch 30 seconds 3 X  30 seconds 3X 30 seconds 3 X  30 seconds 3 X  30 seconds 3 X    Tandem balance  30 seconds 3X Foam 30 seconds 3 X Foam 30 seconds 3 X      Gait on track no AD        Step ups  4\" 10 X 2 1 UE support  6\" 10 X 2 no UE support 6\" 10 X 2 no UE support 6\" 10 X 2 no UE support 6\" 10 X 2 no UE support   Forward tap downs     4\" 10 X 2 B UE support     MANUAL THERAPY: (15 minutes): Joint mobilization, Soft tissue mobilization and Manipulation was utilized and necessary because of the patient's restricted joint motion, painful spasm, loss of articular motion and restricted motion of soft tissue.    +STM lateral aspect of knee, distal ITB  +manual stretching L ITB, HS, gastroc  +gentle overpressure PROM knee flexion, extension    (Used abbreviations: MET - muscle energy technique; PNF - proprioceptive neuromuscular facilitation; NMR - neuromuscular re-education; AP - anterior to posterior; PA - posterior to anterior)    MODALITIES: (0 minutes):        none      Treatment/Session Summary:    Treatment Assessment: Progress note due next visit. Pt tolerated progression of repetitions well today. Requiring minimal cues for technique demonstrating good form. Communication/Consultation:   on objective findings and HEP  Equipment provided today:  None  Recommendations/Intent for next treatment session: Next visit will focus on Advancements to more challenging activities. Progress repetitions, weight, and complexity of movement per pt tolerance and as indicated. .    Total Treatment Billable Duration: 38 minutes  Time In: 1430  Time Out: 1508    Leah Singh, PT       Charge Capture  }Post Session Pain  PT Visit Info  [employer] Portal  MD Guidelines  Scanned Media  Benefits  MyChart    Future Appointments   Date Time Provider Seamus Christiansen   2/23/2023  2:30 PM [facility]   2/25/2023  3:00 PM SFE [facility] ROOM 4 [facility]   2/27/2023  9:15 AM Junior Zonia MD [facility] G[facility] AMB   6/20/2023  3:15 PM Yohannes Vela MD [facility] G[facility] AMB   7/10/2023  9:45 AM Junior Zonia MD [facility] AMB

## 2023-02-23 ENCOUNTER — OFFICE VISIT (OUTPATIENT)
Dept: ORTHOPEDIC SURGERY | Age: 73
End: 2023-02-23

## 2023-02-23 ENCOUNTER — HOSPITAL ENCOUNTER (OUTPATIENT)
Dept: PHYSICAL THERAPY | Age: 73
Setting detail: RECURRING SERIES
Discharge: HOME | End: 2023-02-26
Payer: MEDICARE

## 2023-02-23 DIAGNOSIS — M25.561 RIGHT KNEE PAIN, UNSPECIFIED CHRONICITY: Primary | ICD-10-CM

## 2023-02-23 DIAGNOSIS — M17.11 PRIMARY OSTEOARTHRITIS OF RIGHT KNEE: ICD-10-CM

## 2023-02-23 PROCEDURE — 97140 MANUAL THERAPY 1/> REGIONS: CPT

## 2023-02-23 PROCEDURE — 97110 THERAPEUTIC EXERCISES: CPT

## 2023-02-23 RX ORDER — METHYLPREDNISOLONE ACETATE 40 MG/ML
80 INJECTION, SUSPENSION INTRA-ARTICULAR; INTRALESIONAL; INTRAMUSCULAR; SOFT TISSUE ONCE
Status: CANCELLED | OUTPATIENT
Start: 2023-02-23 | End: 2023-02-23

## 2023-02-23 RX ORDER — METHYLPREDNISOLONE ACETATE 40 MG/ML
80 INJECTION, SUSPENSION INTRA-ARTICULAR; INTRALESIONAL; INTRAMUSCULAR; SOFT TISSUE ONCE
Status: COMPLETED | OUTPATIENT
Start: 2023-02-23 | End: 2023-02-23

## 2023-02-23 RX ADMIN — METHYLPREDNISOLONE ACETATE 80 MG: 40 INJECTION, SUSPENSION INTRA-ARTICULAR; INTRALESIONAL; INTRAMUSCULAR; SOFT TISSUE at 10:57

## 2023-02-23 NOTE — PROGRESS NOTES
Herminia Weber  : 1950  Primary: Medicare Part A And B (Medicare)  Secondary: Nas Leon @ [address] [address]  Phone: [phone]  Fax: [phone] Plan Frequency: 2x week for 90 days    Plan of Care/Certification Expiration Date: 23      PT Visit Info:  Plan Frequency: 2x week for 90 days  Plan of Care/Certification Expiration Date: 23  Total # of Visits Approved: 99  Total # of Visits to Date: 10  PT Insurance Information: MED A and B / [place]   Progress Note Counter: 10      Visit Count:  10    OUTPATIENT PHYSICAL THERAPY:OP NOTE TYPE: Treatment Note 2023       Episode  }Appt Desk             Treatment Diagnosis:    Pain in Left Knee (M25.562)  Stiffness of Left Knee, Not elsewhere classified (M25.662)  Difficulty in walking, Not elsewhere classified (R26.2)  Generalized Muscle Weakness (M62.81)  Medical/Referring Diagnosis:  Status post left knee replacement [W39.864]  Referring Physician:  Javier Yu MD MD Orders:  PT Eval and Treat   Date of Onset:  Onset Date: 22     Allergies:   Latex, Acyclovir, Seasonal, and Adhesive tape  Restrictions/Precautions:  Restrictions/Precautions: Weight Bearing; Fall Risk  Required Braces or Orthoses?: No  Right Lower Extremity Weight Bearing: Weight Bearing As Tolerated  Left Lower Extremity Weight Bearing: Weight Bearing As Tolerated     Interventions Planned (Treatment may consist of any combination of the following):    Current Treatment Recommendations: Balance training; ROM; Strengthening; Functional mobility training; Endurance training; Gait training; Stair training; Neuromuscular re-education; Manual; Pain management; Home exercise program; Patient/Caregiver education & training; Modalities; Therapeutic activities     Subjective Comments: Pt reports feeling much better today. No pain currently.  Unsure if she wants to continue PT after this week. Initial:}  3  /10Post Session:       2 /10  Medications Last Reviewed:  2/23/2023  Updated Objective Findings:  None Today  Treatment   THERAPEUTIC EXERCISE: (25 minutes):  Exercises per grid below to improve mobility, strength and balance. Required minimal-moderate visual and verbal cues to promote proper body alignment and promote proper body posture. Progressed resistance, range and complexity of movement as indicated. Date:  2/7/23 Date:  2/10/23 Date:  2/13/23 Date:  2/16/23 Date:  2/21/23 Date:  2/23/23   Activity/Exercise         Heel slides         Quad set         bridges 15 X 2 15 X 2 15 X 3  15 X 2  10 X 3 10 X 3   ASLR 10 X 2  10 X 3  10 X 3  #2 10 X 2  #2 10 X 3  10 X 3   SAQ #2 15 X 2 #2 15 X 2 #2 15 X 2  #3 10 X 3  #3 10 X 3     clamshell 15 X 2  15 X 2 15 X 2  15 X 2  10 X 3 10 X 3   S/l hip abduction 10 X 2  10 X 2  10 X 2  #2 10 X 2  #2 10 X 2  10 X 3    LAQ #2 15 X 2  #2 15 X 2  #2 15 X 2  #3 10 X 3  #3 10 X 32 10 X 3   Standing HS curl #2 10 X 2  #2 10 X 2  #2 15 X 2    10 X 3   Heel raises 15 X 2  15 X 2  15 X 2  15 X 2  10 X 3  10 X 3   SLS 30 seconds 3 x 30 seconds 3X 30 seconds 3X 30 seconds 3X   30 seconds 3 X    Slantboard stretch 30 seconds 3 X  30 seconds 3X 30 seconds 3 X  30 seconds 3 X  30 seconds 3 X  30 seconds 3 X      Tandem balance  30 seconds 3X Foam 30 seconds 3 X Foam 30 seconds 3 X       Gait on track no AD         Step ups  4\" 10 X 2 1 UE support  6\" 10 X 2 no UE support 6\" 10 X 2 no UE support 6\" 10 X 2 no UE support 6\" 10 X 2 no UE support    Forward tap downs     4\" 10 X 2 B UE support      MANUAL THERAPY: (15 minutes): Joint mobilization, Soft tissue mobilization and Manipulation was utilized and necessary because of the patient's restricted joint motion, painful spasm, loss of articular motion and restricted motion of soft tissue.    +STM lateral aspect of knee, distal ITB  +manual stretching L ITB, HS, gastroc  +gentle overpressure PROM knee flexion, extension    (Used abbreviations: MET - muscle energy technique; PNF - proprioceptive neuromuscular facilitation; NMR - neuromuscular re-education; AP - anterior to posterior; PA - posterior to anterior)    MODALITIES: (0 minutes):        none      Treatment/Session Summary:    Treatment Assessment: Pt met all STG this visit. Making good steady progress towards all goals. Requesting discharge with HEP this visit. Reviewed and provided HEP   .     Total Treatment Billable Duration: 43 minutes  Time In: 1430  Time Out: 1513    Emma Lofton, PT       Charge Capture  }Post Session Pain  PT Visit Info  [employer] Portal  MD Guidelines  Scanned Media  Benefits  MyChart    Future Appointments   Date Time Provider [place]   2/25/2023  3:00 PM SFE [facility] ROOM 4 SFERMAM SFE   2/27/2023  9:15 AM MD YANNI Gonzalez GVL AMB   3/10/2023  9:00 AM Michael Fleet, PA [facility] AMB   3/17/2023  9:00 AM Michael Fleet, PA [facility] AMB   3/24/2023  9:00 AM Michael Fleet, PA [facility] AMB   3/29/2023  2:45 PM Michael Fleet, PA [facility] AMB   4/5/2023  2:45 PM Michael Fleet, PA [facility] AMB   4/12/2023  2:45 PM Michael Fleet, PA [facility] AMB   6/20/2023  3:15 PM MD MICHAEL Waldrop [facility] AMB   7/10/2023  9:45 AM Evelyne Longoria MD PVLILLIAN [facility] AMB

## 2023-02-23 NOTE — PROGRESS NOTES
Procedure Note    Patient Name: Jerel Espana    Date of Procedure: February 23, 2023    Preoperative Diagnosis:     ICD-10-CM    1. Right knee pain, unspecified chronicity  M25.561 XR KNEE RIGHT (MIN 4 VIEWS)        XRAY:  AP, lateral, sunrise, and 45 degree  views of the right knee are reviewed. There is no dramatic joint space loss, eburnated bone or osteophyte formation. There is some joint space narrowing present. X-ray impression:  early degenerative changes of the right knee    Post Operative Diagnosis: same    Procedure: right knee joint Injection    Consent: The patient was given the opportunity to ask questions regarding the procedure and its associated risks. Procedure: The patient was placed in a sitting position on the exam table and the right knee was prepped in the usual sterile manner. The knee was anesthestized with 3 cc of xylocaine with a 25 gauge needle, the needle was left in place a total of 2cc of 40mg methylprednisolone and 5 cc of Lidocaine was slowly injected. The patient tolerated the procedure well. The injection area was cleaned and Band-Aid applied. No excessive bleeding was noted. Patient dressed and was discharged to home with instructions. Discussion:  The patient tolerated the procedure well.     Follow up: four months        FRAIDA Campos  February 23, 2023

## 2023-02-23 NOTE — THERAPY DISCHARGE
Nikki Guzman  : 1950  Primary: Medicare Part A And B (Medicare)  Secondary: Nas Leon @ [address] [address]  Phone: [phone]  Fax: [phone] Plan Frequency: 2x week for 90 days    Plan of Care/Certification Expiration Date: 23      PT Visit Info:  Plan Frequency: 2x week for 90 days  Plan of Care/Certification Expiration Date: 23  Total # of Visits Approved: 99  Total # of Visits to Date: 10  PT Insurance Information: MED A and B / [place]   Progress Note Counter: 10      Visit Count:  10                OUTPATIENT PHYSICAL THERAPY:             OP NOTE TYPE: Discharge Summary 2023               Episode (L TKA) Appt Desk         Treatment Diagnosis:  Pain in Left Knee (M25.562)  Stiffness of Left Knee, Not elsewhere classified (M25.662)  Difficulty in walking, Not elsewhere classified (R26.2)  Generalized Muscle Weakness (M62.81)  Medical/Referring Diagnosis:  Status post left knee replacement [E11.417]  Referring Physician:  Kathleen Rendon MD MD Orders:  PT Eval and Treat   Return MD Appt:  23  Date of Onset:  Onset Date: 22      Allergies:  Latex, Acyclovir, Seasonal, and Adhesive tape  Restrictions/Precautions:    Restrictions/Precautions: Weight Bearing; Fall Risk  Required Braces or Orthoses?: No  Right Lower Extremity Weight Bearing: Weight Bearing As Tolerated  Left Lower Extremity Weight Bearing: Weight Bearing As Tolerated     Medications Last Reviewed:  2023     SUBJECTIVE   History of Injury/Illness (Reason for Referral):  Pt is s/p L TKA. Pt reports finishing up with HHPT on 23. Pt reports pain has ranged from 2-3/10 to 6/10. Pt was using SPC prior to surgery due to L knee pain but has continued with SPC at this time.  Able to ambulate household distances without AD And SPC for community distances  Patient Stated Goal(s):  \"to be less stiff after surgery\"  Initial:     3 /10 Post Session:     4 /10  Past Medical History/Comorbidities:   Ms. Yfn Benavidez  has a past medical history of Arrhythmia, Arthritis, Hypertension, Lipedema, Lipedema, Obesity (BMI 30-39.9), and Thyroid disease. Ms. Yfn Benavidez  has a past surgical history that includes gyn (2007); heent; Urological Surgery; heent (2003); heent (2008, 2009); and Total knee arthroplasty (Left, 12/12/2022). 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   Leisure & Hobbies: yardwork  ADL Assistance: Independent  Homemaking Assistance: Independent  Ambulation Assistance: Independent  Transfer Assistance: Independent        Learning:   Does the patient/guardian have any barriers to learning?: No barriers  Will there be a co-learner?: No  What is the preferred language of the patient/guardian?: English  Is an  required?: No  How does the patient/guardian prefer to learn new concepts?: Listening; Reading; Demonstration; Pictures/Videos     Fall Risk Scale:    Potter Total Score: 39  Potter Fall Risk: High (45 and higher)           OBJECTIVE   Observation:    LE Structure:               Left: Increased edema    Gait: Antalgic L LE, decreased stance time    Assistive Device: SPC          Edema: Increased L    Range Of Motion  Joint: Active Active Active Left 2/23/23    Right (Degrees) Left (Degrees)    Hip Flexion      Hip Extension      Hip Adduction      Hip Abduction      Hip Internal Rotation      Hip External Rotation      Knee Flexion 105 degrees  90 degrees  104 degrees   Knee Extension 0 degrees  -5 degrees 0 degrees   Ankle Dorsiflexion      Ankle Plantarflexion        Strength  Joint:   Left 2/23/23    RIGHT LEFT    Hip Flexion 5/5 3+/5 4+/5    Hip Extension NT/5 NT    Hip Internal Rotation 5/5 3/5 5/5   Hip External Rotation 5/5 3/5 4-4+/5   Hip Abduction NT/5 NT    Hip Adduction NT/5 NT    Knee Flexion 5/5 3+/5 4+/5   Knee Extension 5/5 5/5 5/5     BALANCE Date: 1/24/23 Date: 2/23/23   Right NT    Left NT 20 seconds        Palpation: TTP along lateral aspect of knee, distal ITB  Joint Mobilization: slight patellar hypomobility noted    ASSESSMENT   Progress Note/Discharge Summary:  Pt has been seen for a total of 10 PT sessions to date. Pt reports pain has ranged from 0/10-6/10 over the past week. Pt demonstrating improvements in strength, ROM and balance. Pt does still take her [facility] for community distances more for safety purposes. Pt would continue to benefit from skilled PT interventions at this time however pt requesting discharge with [facility] at this time. Pt plans to continue with aquatic exercises 3X per week. Initial Assessment:  Ms. Patricia Parmar presents to physical therapy with s/p L TKA. Pt demonstrating decreased strength, ROM, joint mobility, functional mobility affecting participation in ADLs and functional mobility at this time. Patient will benefit from skilled physical therapy for manual therapeutic techniques (as appropriate), therapeutic exercises and activities, balance and comprehensive home exercises program to address current impairments and functional limitations. Florentino Hensley will benefit from skilled PT (medically necessary) in order to address above deficits affecting participation in basic ADLs and overall functional tolerance. Problem List: (Impacting functional limitations):     Body Structures, Functions, Activity Limitations Requiring Skilled Therapeutic Intervention: Decreased functional mobility ; Decreased ROM; Decreased body mechanics; Decreased tolerance to work activity; Decreased strength; Decreased coordination     Therapy Prognosis:   Therapy Prognosis: Good     Initial Assessment Complexity:   Decision Making: Low Complexity    PLAN   Effective Dates: 1/24/23 TO Plan of Care/Certification Expiration Date: 04/24/23     Frequency/Duration: Plan Frequency: 2x week for 90 days     Interventions Planned (Treatment may consist of any combination of the following):    Current Treatment Recommendations: Balance training; ROM; Strengthening; Functional mobility training; Endurance training; Gait training; Stair training; Neuromuscular re-education; Manual; Pain management; Home exercise program; Patient/Caregiver education & training; Modalities; Therapeutic activities     Goals: (Goals have been discussed and agreed upon with patient.)  Short-Term Functional Goals: Time Frame: 4 weeks  Alyssia Jackson will be compliant with HEP. Goal met 2/23/23  Alyssia Jackson will report 4/10 pain in order to return to gait without AD and compensations Progressing 2/23/23  Alyssia Jackson will demonstrate 0-100 degrees knee ROM in order to ascend/descend step with reciprocal pattern and without compensations Goal met 2/23/23  Alyssia Jackson will demonstrate 4/5 LE strength in order to improve standing and walking tolerance Goal met 2/23/23  Alyssia Jackson will maintain SLS for up to 15 seconds in order to reduce falls risk Goal met 2/23/23  Alyssia Jackson will score 45/80 on LEFS demonstrating overall improvements in functional mobility Goal met 2/23/23  Discharge Goals: Time Frame: 12 weeks  Alyssia Jackson will be independent with HEP.    Alyssia Jackson will report 2/10 pain in order to return to gait without AD and compensations Progressing 2/23/23  Alyssia Jackson will demonstrate 0-110 degrees knee ROM in order to ascend/descend step with reciprocal pattern and without compensations Progressing 2/23/23  Evelio Salas will demonstrate 5/5 LE strength in order to improve standing and walking tolerance Progressing 2/23/23  Evelio Salas will maintain SLS for up to 30 seconds in order to reduce falls risk Progressing 2/23/23  Evelio Salas will score 60/80 on LEFS demonstrating overall improvements in functional mobility Progressing 2/23/23           Outcome Measure: Tool Used: Knee injury and Osteoarthritis Outcome Score for Joint Replacement (KOOS, JR)  Score:  Initial: 9 (Interval: 63.776) 1/24/2023 Most Recent: 10 (Interval: 61.583) Date:    (2/23/23)   Interpretation of Score: The KOOS, JR contains 7 items from the original KOOS survey. Items are coded from 0 to 4, none to extreme respectively. Guy Ovens is scored by summing the raw response (range 0-28) and then converting it to an interval score using the table provided below. The interval score ranges from 0 to 100 where 0 represents total knee disability and 100 represents perfect knee health. Tool Used: Lower Extremity Functional Scale (LEFS)  Score:  Initial: 35/80 Most Recent: 45/80 (Date: 2/23/23 )   Interpretation of Score: 20 questions each scored on a 5 point scale with 0 representing \"extreme difficulty or unable to perform\" and 4 representing \"no difficulty\". The lower the score, the greater the functional disability. 80/80 represents no disability. Minimal detectable change is 9 points. Regarding Zacarias Jolly's therapy, I certify that the treatment plan above will be carried out by a therapist or under their direction. Thank you for this referral,  Winnie Farr, PT     Referring Physician Signature: Tia Conti., * No Signature is Required for this note.         Post Session Pain  Charge Capture  PT Visit Info MD Kody Downsharjoey

## 2023-02-25 ENCOUNTER — HOSPITAL ENCOUNTER (OUTPATIENT)
Dept: MAMMOGRAPHY | Age: 73
End: 2023-02-25
Payer: MEDICARE

## 2023-02-25 DIAGNOSIS — Z12.31 VISIT FOR SCREENING MAMMOGRAM: ICD-10-CM

## 2023-02-25 PROCEDURE — 77067 SCR MAMMO BI INCL CAD: CPT

## 2023-02-26 SDOH — ECONOMIC STABILITY: INCOME INSECURITY: HOW HARD IS IT FOR YOU TO PAY FOR THE VERY BASICS LIKE FOOD, HOUSING, MEDICAL CARE, AND HEATING?: NOT VERY HARD

## 2023-02-26 SDOH — ECONOMIC STABILITY: FOOD INSECURITY: WITHIN THE PAST 12 MONTHS, THE FOOD YOU BOUGHT JUST DIDN'T LAST AND YOU DIDN'T HAVE MONEY TO GET MORE.: NEVER TRUE

## 2023-02-26 SDOH — ECONOMIC STABILITY: TRANSPORTATION INSECURITY
IN THE PAST 12 MONTHS, HAS LACK OF TRANSPORTATION KEPT YOU FROM MEETINGS, WORK, OR FROM GETTING THINGS NEEDED FOR DAILY LIVING?: NO

## 2023-02-26 SDOH — ECONOMIC STABILITY: HOUSING INSECURITY
IN THE LAST 12 MONTHS, WAS THERE A TIME WHEN YOU DID NOT HAVE A STEADY PLACE TO SLEEP OR SLEPT IN A SHELTER (INCLUDING NOW)?: NO

## 2023-02-26 SDOH — ECONOMIC STABILITY: FOOD INSECURITY: WITHIN THE PAST 12 MONTHS, YOU WORRIED THAT YOUR FOOD WOULD RUN OUT BEFORE YOU GOT MONEY TO BUY MORE.: NEVER TRUE

## 2023-02-27 ENCOUNTER — OFFICE VISIT (OUTPATIENT)
Dept: FAMILY MEDICINE CLINIC | Facility: CLINIC | Age: 73
End: 2023-02-27
Payer: MEDICARE

## 2023-02-27 VITALS
TEMPERATURE: 97.8 F | DIASTOLIC BLOOD PRESSURE: 70 MMHG | SYSTOLIC BLOOD PRESSURE: 110 MMHG | HEIGHT: 66 IN | WEIGHT: 243.8 LBS | HEART RATE: 64 BPM | OXYGEN SATURATION: 98 % | BODY MASS INDEX: 39.18 KG/M2

## 2023-02-27 DIAGNOSIS — F33.0 MAJOR DEPRESSIVE DISORDER, RECURRENT, MILD (HCC): ICD-10-CM

## 2023-02-27 DIAGNOSIS — E03.9 HYPOTHYROIDISM, UNSPECIFIED TYPE: Primary | ICD-10-CM

## 2023-02-27 PROCEDURE — 1090F PRES/ABSN URINE INCON ASSESS: CPT | Performed by: FAMILY MEDICINE

## 2023-02-27 PROCEDURE — G8427 DOCREV CUR MEDS BY ELIG CLIN: HCPCS | Performed by: FAMILY MEDICINE

## 2023-02-27 PROCEDURE — 99213 OFFICE O/P EST LOW 20 MIN: CPT | Performed by: FAMILY MEDICINE

## 2023-02-27 PROCEDURE — G8417 CALC BMI ABV UP PARAM F/U: HCPCS | Performed by: FAMILY MEDICINE

## 2023-02-27 PROCEDURE — G8484 FLU IMMUNIZE NO ADMIN: HCPCS | Performed by: FAMILY MEDICINE

## 2023-02-27 PROCEDURE — 1036F TOBACCO NON-USER: CPT | Performed by: FAMILY MEDICINE

## 2023-02-27 PROCEDURE — 1123F ACP DISCUSS/DSCN MKR DOCD: CPT | Performed by: FAMILY MEDICINE

## 2023-02-27 PROCEDURE — G8399 PT W/DXA RESULTS DOCUMENT: HCPCS | Performed by: FAMILY MEDICINE

## 2023-02-27 PROCEDURE — 3017F COLORECTAL CA SCREEN DOC REV: CPT | Performed by: FAMILY MEDICINE

## 2023-02-27 RX ORDER — AMOXICILLIN 500 MG/1
CAPSULE ORAL
COMMUNITY
Start: 2022-12-30

## 2023-02-27 ASSESSMENT — PATIENT HEALTH QUESTIONNAIRE - PHQ9
2. FEELING DOWN, DEPRESSED OR HOPELESS: 0
1. LITTLE INTEREST OR PLEASURE IN DOING THINGS: 0
SUM OF ALL RESPONSES TO PHQ QUESTIONS 1-9: 0
SUM OF ALL RESPONSES TO PHQ9 QUESTIONS 1 & 2: 0

## 2023-02-27 NOTE — PROGRESS NOTES
[address] [place] [address] (: 1950) is a 81 y.o. female, established patient, here for evaluation of the following chief complaint(s):  Thyroid Problem       ASSESSMENT/PLAN:  1. Hypothyroidism, unspecified type  -     TSH; Future  2. Major depressive disorder, recurrent, mild ([address]  Recheck for thyroid, lost some weight, still fatigued, dealing with knee arthritis   Return to office in 6 months for f/u chronic issues in 2023. SUBJECTIVE/OBJECTIVE:  Thyroid Problem    Increased levothyroxine last visit, doing well overall, some fatigue  Father in hospital, dealing with stress with family situation    Physical Exam  Vitals and nursing note reviewed. Constitutional:       General: She is not in acute distress. Appearance: Normal appearance. She is not ill-appearing. HENT:      Head: Normocephalic and atraumatic. Right Ear: External ear normal.      Left Ear: External ear normal.      Nose: Nose normal.      Mouth/Throat:      Mouth: Mucous membranes are dry. Eyes:      Extraocular Movements: Extraocular movements intact. Pupils: Pupils are equal, round, and reactive to light. Cardiovascular:      Rate and Rhythm: Normal rate. Pulses: Normal pulses. Pulmonary:      Effort: Pulmonary effort is normal.      Breath sounds: Normal breath sounds. Abdominal:      General: There is no distension. Musculoskeletal:         General: Normal range of motion. Cervical back: Normal range of motion and neck supple. Skin:     General: Skin is warm and dry. Neurological:      General: No focal deficit present. Mental Status: She is alert and oriented to person, place, and time.    Psychiatric:         Mood and Affect: Mood normal.     On this date 23  I have spent 20 minutes reviewing previous notes, test results and face to face with the patient discussing the diagnosis and importance of compliance with the treatment plan as well as documenting on the day of the visit. An electronic signature was used to authenticate this note.   -- Brii Garcia MD

## 2023-02-28 LAB — TSH, 3RD GENERATION: 2.01 UIU/ML (ref 0.36–3.74)

## 2023-03-29 ENCOUNTER — OFFICE VISIT (OUTPATIENT)
Dept: ORTHOPEDIC SURGERY | Age: 73
End: 2023-03-29

## 2023-03-29 DIAGNOSIS — M17.11 PRIMARY OSTEOARTHRITIS OF RIGHT KNEE: Primary | ICD-10-CM

## 2023-03-29 RX ORDER — HYALURONATE SODIUM 10 MG/ML
20 SYRINGE (ML) INTRAARTICULAR ONCE
Status: COMPLETED | OUTPATIENT
Start: 2023-03-29 | End: 2023-03-29

## 2023-03-29 RX ADMIN — Medication 20 MG: at 15:15

## 2023-03-29 NOTE — PROGRESS NOTES
Name: Fátima Song  YOB: 1950  Gender: female  MRN: [id number]      CC: Right Knee Pain     PROCEDURE: 1 of 3 HP    DIAGNOSIS:    Encounter Diagnosis   Name Primary? Primary osteoarthritis of right knee Yes       [employer] US unit with variable frequency (6.0-15.0 MHz) linear transducer was used visualize the intracondylar notch, retropatellar fat pad, patella tendon, patella, tibia, and to ensure proper intra-articular needle placement. Injection image was saved to patient's permanent chart. Procedure Note: The patient was placed in upright position with knee hanging freely from exam table. The right  knee was prepped in sterile fashion using alcohol wipe. Using Mindray ultrasound guidance, a 22 gauge needle was then introduced into the knee joint from an infrapatellar approach and  2 mL of Euflexxa was injected freely. The needle was then removed, pressure hemostatis achieved, injection site was cleansed with alcohol wipe and dressed with band aid. The patient tolerated the procedure without complication.   The patient  will follow up as scheduled with Dr. Alex Umanzor team

## 2023-04-05 ENCOUNTER — OFFICE VISIT (OUTPATIENT)
Dept: ORTHOPEDIC SURGERY | Age: 73
End: 2023-04-05
Payer: MEDICARE

## 2023-04-05 VITALS — BODY MASS INDEX: 39.05 KG/M2 | HEIGHT: 66 IN | WEIGHT: 243 LBS

## 2023-04-05 DIAGNOSIS — M17.11 PRIMARY OSTEOARTHRITIS OF RIGHT KNEE: Primary | ICD-10-CM

## 2023-04-05 PROCEDURE — 20610 DRAIN/INJ JOINT/BURSA W/O US: CPT | Performed by: PHYSICIAN ASSISTANT

## 2023-04-05 RX ORDER — HYALURONATE SODIUM 10 MG/ML
20 SYRINGE (ML) INTRAARTICULAR ONCE
Status: COMPLETED | OUTPATIENT
Start: 2023-04-05 | End: 2023-04-05

## 2023-04-05 RX ADMIN — Medication 20 MG: at 14:31

## 2023-04-05 NOTE — PROGRESS NOTES
Name: Daryn Song  YOB: 1950  Gender: female  MRN: [id number]    Allergies   Allergen Reactions    Latex Rash    Acyclovir Other (See Comments) and Rash     fever    Seasonal     Adhesive Tape Rash       CC:  right knee pain, Osteoarthritis    PROCEDURE: 2 of 3 HA injection(s). All questions answered. Procedure Note: The patient was placed in upright position with both knees hanging freely from exam table. The right knee was prepped in sterile fashion using alcohol wipe(s). Using an infrapatellar approach, 2.5cc of Euflexa was injected freely. The needle was then removed, pressure hemostatis achieved, injection site was cleansed with alcohol wipe and dressed with band aid. The patient tolerated the procedure without complication. The patient  will follow up as scheduled.     FARIDA Forman  04/05/23

## 2023-05-10 DIAGNOSIS — F41.8 SITUATIONAL ANXIETY: ICD-10-CM

## 2023-05-10 RX ORDER — HYDROXYZINE HYDROCHLORIDE 25 MG/1
25 TABLET, FILM COATED ORAL 4 TIMES DAILY PRN
Qty: 120 TABLET | Refills: 1 | Status: SHIPPED | OUTPATIENT
Start: 2023-05-10

## 2023-06-20 ENCOUNTER — OFFICE VISIT (OUTPATIENT)
Dept: ORTHOPEDIC SURGERY | Age: 73
End: 2023-06-20
Payer: MEDICARE

## 2023-06-20 DIAGNOSIS — Z96.652 STATUS POST LEFT KNEE REPLACEMENT: Primary | ICD-10-CM

## 2023-06-20 DIAGNOSIS — M17.11 PRIMARY OSTEOARTHRITIS OF RIGHT KNEE: ICD-10-CM

## 2023-06-20 PROCEDURE — 1123F ACP DISCUSS/DSCN MKR DOCD: CPT | Performed by: ORTHOPAEDIC SURGERY

## 2023-06-20 PROCEDURE — 1036F TOBACCO NON-USER: CPT | Performed by: ORTHOPAEDIC SURGERY

## 2023-06-20 PROCEDURE — G8417 CALC BMI ABV UP PARAM F/U: HCPCS | Performed by: ORTHOPAEDIC SURGERY

## 2023-06-20 PROCEDURE — G8399 PT W/DXA RESULTS DOCUMENT: HCPCS | Performed by: ORTHOPAEDIC SURGERY

## 2023-06-20 PROCEDURE — G8428 CUR MEDS NOT DOCUMENT: HCPCS | Performed by: ORTHOPAEDIC SURGERY

## 2023-06-20 PROCEDURE — 3017F COLORECTAL CA SCREEN DOC REV: CPT | Performed by: ORTHOPAEDIC SURGERY

## 2023-06-20 PROCEDURE — 99213 OFFICE O/P EST LOW 20 MIN: CPT | Performed by: ORTHOPAEDIC SURGERY

## 2023-06-20 PROCEDURE — 1090F PRES/ABSN URINE INCON ASSESS: CPT | Performed by: ORTHOPAEDIC SURGERY

## 2023-06-20 NOTE — PROGRESS NOTES
Post-op TKA Visit      06/20/23     Allergies   Allergen Reactions    Latex Rash    Acyclovir Other (See Comments) and Rash     fever    Seasonal     Adhesive Tape Rash     Current Outpatient Medications on File Prior to Visit   Medication Sig Dispense Refill    hydrOXYzine HCl (ATARAX) 25 MG tablet TAKE 1 TABLET BY MOUTH 4 TIMES DAILY AS NEEDED FOR ANXIETY 120 tablet 1    amoxicillin (AMOXIL) 500 MG capsule TAKE 4 TABLETS 1 HOUR PRIOR PROCEDURE (Patient not taking: Reported on 2/27/2023)      levothyroxine (SYNTHROID) 88 MCG tablet Take 1 tablet by mouth every morning (before breakfast) 90 tablet 3    oxybutynin (DITROPAN-XL) 10 MG extended release tablet Take 1 tablet by mouth at bedtime 90 tablet 1    omeprazole (PRILOSEC) 20 MG delayed release capsule Take 1 capsule by mouth every morning (before breakfast) 90 capsule 1    promethazine (PHENERGAN) 25 MG tablet Take 1 tablet by mouth every 8 hours as needed for Nausea 30 tablet 1    cyclobenzaprine (FLEXERIL) 5 MG tablet Take 1 tablet by mouth 2 times daily as needed for Muscle spasms 30 tablet 1    cetirizine (ZYRTEC) 10 MG tablet Take 10 mg by mouth nightly      Glycerin-Polysorbate 80 (REFRESH DRY EYE THERAPY OP) Apply to eye as needed One drop in each eye PRN for dry eyes. diphenhydrAMINE (BENADRYL) 25 MG tablet Take 25 mg by mouth One HS for allergies      Multiple Vitamins-Minerals (THERAPEUTIC MULTIVITAMIN-MINERALS) tablet Take 1 tablet by mouth daily      Cholecalciferol 50 MCG (2000 UT) TABS Take 2,000 Units by mouth daily       No current facility-administered medications on file prior to visit. 6 months Status Post left TKA     History: The patient returns today for post-op visit following left TKA. Their pain is improving. They are ambulating without any walking aid. They have resumed most of their normal activities. They are pleased with their results thus far. Denies any new complaints today.      Physical Exam:  The patient's

## 2023-07-10 ENCOUNTER — OFFICE VISIT (OUTPATIENT)
Dept: FAMILY MEDICINE CLINIC | Facility: CLINIC | Age: 73
End: 2023-07-10
Payer: MEDICARE

## 2023-07-10 VITALS
DIASTOLIC BLOOD PRESSURE: 70 MMHG | BODY MASS INDEX: 39.41 KG/M2 | HEART RATE: 65 BPM | WEIGHT: 245.2 LBS | SYSTOLIC BLOOD PRESSURE: 130 MMHG | OXYGEN SATURATION: 98 % | HEIGHT: 66 IN | TEMPERATURE: 97.9 F

## 2023-07-10 DIAGNOSIS — F41.8 SITUATIONAL ANXIETY: Primary | ICD-10-CM

## 2023-07-10 DIAGNOSIS — F33.0 MAJOR DEPRESSIVE DISORDER, RECURRENT, MILD (HCC): ICD-10-CM

## 2023-07-10 DIAGNOSIS — E03.9 HYPOTHYROIDISM, UNSPECIFIED TYPE: ICD-10-CM

## 2023-07-10 LAB — TSH, 3RD GENERATION: 1.47 UIU/ML (ref 0.36–3.74)

## 2023-07-10 PROCEDURE — 1090F PRES/ABSN URINE INCON ASSESS: CPT | Performed by: FAMILY MEDICINE

## 2023-07-10 PROCEDURE — 99214 OFFICE O/P EST MOD 30 MIN: CPT | Performed by: FAMILY MEDICINE

## 2023-07-10 PROCEDURE — G8417 CALC BMI ABV UP PARAM F/U: HCPCS | Performed by: FAMILY MEDICINE

## 2023-07-10 PROCEDURE — 3017F COLORECTAL CA SCREEN DOC REV: CPT | Performed by: FAMILY MEDICINE

## 2023-07-10 PROCEDURE — 1123F ACP DISCUSS/DSCN MKR DOCD: CPT | Performed by: FAMILY MEDICINE

## 2023-07-10 PROCEDURE — G8427 DOCREV CUR MEDS BY ELIG CLIN: HCPCS | Performed by: FAMILY MEDICINE

## 2023-07-10 PROCEDURE — 1036F TOBACCO NON-USER: CPT | Performed by: FAMILY MEDICINE

## 2023-07-10 PROCEDURE — G8399 PT W/DXA RESULTS DOCUMENT: HCPCS | Performed by: FAMILY MEDICINE

## 2023-07-10 RX ORDER — CYCLOSPORINE 0.5 MG/ML
EMULSION OPHTHALMIC
COMMUNITY
Start: 2023-07-07

## 2023-07-10 RX ORDER — PREDNISOLONE ACETATE 10 MG/ML
SUSPENSION/ DROPS OPHTHALMIC
COMMUNITY
Start: 2023-06-24

## 2023-07-10 ASSESSMENT — PATIENT HEALTH QUESTIONNAIRE - PHQ9
1. LITTLE INTEREST OR PLEASURE IN DOING THINGS: 0
SUM OF ALL RESPONSES TO PHQ QUESTIONS 1-9: 1
SUM OF ALL RESPONSES TO PHQ QUESTIONS 1-9: 1
SUM OF ALL RESPONSES TO PHQ9 QUESTIONS 1 & 2: 1
SUM OF ALL RESPONSES TO PHQ QUESTIONS 1-9: 1
SUM OF ALL RESPONSES TO PHQ QUESTIONS 1-9: 1
2. FEELING DOWN, DEPRESSED OR HOPELESS: 1
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
## 2023-07-18 RX ORDER — OMEPRAZOLE 20 MG/1
20 CAPSULE, DELAYED RELEASE ORAL
Qty: 90 CAPSULE | Refills: 1 | Status: SHIPPED | OUTPATIENT
Start: 2023-07-18

## 2023-07-18 RX ORDER — OXYBUTYNIN CHLORIDE 10 MG/1
10 TABLET, EXTENDED RELEASE ORAL NIGHTLY
Qty: 90 TABLET | Refills: 1 | Status: SHIPPED | OUTPATIENT
Start: 2023-07-18

## 2023-09-25 ENCOUNTER — HOSPITAL ENCOUNTER (EMERGENCY)
Age: 73
Discharge: HOME | End: 2023-09-25
Attending: EMERGENCY MEDICINE
Payer: MEDICARE

## 2023-09-25 ENCOUNTER — APPOINTMENT (OUTPATIENT)
Dept: GENERAL RADIOLOGY | Age: 73
End: 2023-09-25
Payer: MEDICARE

## 2023-09-25 ENCOUNTER — APPOINTMENT (OUTPATIENT)
Dept: CT IMAGING | Age: 73
End: 2023-09-25
Payer: MEDICARE

## 2023-09-25 VITALS
OXYGEN SATURATION: 98 % | BODY MASS INDEX: 38.45 KG/M2 | SYSTOLIC BLOOD PRESSURE: 151 MMHG | HEART RATE: 65 BPM | WEIGHT: 245 LBS | DIASTOLIC BLOOD PRESSURE: 66 MMHG | TEMPERATURE: 97.8 F | RESPIRATION RATE: 16 BRPM | HEIGHT: 67 IN

## 2023-09-25 DIAGNOSIS — R07.9 CHEST PAIN, UNSPECIFIED TYPE: Primary | ICD-10-CM

## 2023-09-25 LAB
ALBUMIN SERPL-MCNC: 3.8 G/DL (ref 3.2–4.6)
ALBUMIN/GLOB SERPL: 0.8 (ref 0.4–1.6)
ALP SERPL-CCNC: 184 U/L (ref 50–136)
ALT SERPL-CCNC: 37 U/L (ref 12–65)
ANION GAP SERPL CALC-SCNC: 4 MMOL/L (ref 2–11)
AST SERPL-CCNC: 35 U/L (ref 15–37)
BASOPHILS # BLD: 0.1 K/UL (ref 0–0.2)
BASOPHILS NFR BLD: 1 % (ref 0–2)
BILIRUB SERPL-MCNC: 0.4 MG/DL (ref 0.2–1.1)
BUN SERPL-MCNC: 25 MG/DL (ref 8–23)
CALCIUM SERPL-MCNC: 9.8 MG/DL (ref 8.3–10.4)
CHLORIDE SERPL-SCNC: 110 MMOL/L (ref 101–110)
CO2 SERPL-SCNC: 26 MMOL/L (ref 21–32)
CREAT SERPL-MCNC: 0.8 MG/DL (ref 0.6–1)
D DIMER PPP FEU-MCNC: 0.98 UG/ML(FEU)
DIFFERENTIAL METHOD BLD: NORMAL
EKG ATRIAL RATE: 64 BPM
EKG DIAGNOSIS: NORMAL
EKG P AXIS: 50 DEGREES
EKG P-R INTERVAL: 142 MS
EKG Q-T INTERVAL: 410 MS
EKG QRS DURATION: 78 MS
EKG QTC CALCULATION (BAZETT): 422 MS
EKG R AXIS: 66 DEGREES
EKG T AXIS: 26 DEGREES
EKG VENTRICULAR RATE: 64 BPM
EOSINOPHIL # BLD: 0.3 K/UL (ref 0–0.8)
EOSINOPHIL NFR BLD: 4 % (ref 0.5–7.8)
ERYTHROCYTE [DISTWIDTH] IN BLOOD BY AUTOMATED COUNT: 13.7 % (ref 11.9–14.6)
GLOBULIN SER CALC-MCNC: 4.6 G/DL (ref 2.8–4.5)
GLUCOSE SERPL-MCNC: 88 MG/DL (ref 65–100)
HCT VFR BLD AUTO: 41.6 % (ref 35.8–46.3)
HGB BLD-MCNC: 13.4 G/DL (ref 11.7–15.4)
IMM GRANULOCYTES # BLD AUTO: 0 K/UL (ref 0–0.5)
IMM GRANULOCYTES NFR BLD AUTO: 0 % (ref 0–5)
LIPASE SERPL-CCNC: 83 U/L (ref 73–393)
LYMPHOCYTES # BLD: 1.8 K/UL (ref 0.5–4.6)
LYMPHOCYTES NFR BLD: 29 % (ref 13–44)
MCH RBC QN AUTO: 30.2 PG (ref 26.1–32.9)
MCHC RBC AUTO-ENTMCNC: 32.2 G/DL (ref 31.4–35)
MCV RBC AUTO: 93.9 FL (ref 82–102)
MONOCYTES # BLD: 0.4 K/UL (ref 0.1–1.3)
MONOCYTES NFR BLD: 7 % (ref 4–12)
NEUTS SEG # BLD: 3.6 K/UL (ref 1.7–8.2)
NEUTS SEG NFR BLD: 59 % (ref 43–78)
NRBC # BLD: 0 K/UL (ref 0–0.2)
PLATELET # BLD AUTO: 287 K/UL (ref 150–450)
PMV BLD AUTO: 10.8 FL (ref 9.4–12.3)
POTASSIUM SERPL-SCNC: 4.9 MMOL/L (ref 3.5–5.1)
PROT SERPL-MCNC: 8.4 G/DL (ref 6.3–8.2)
RBC # BLD AUTO: 4.43 M/UL (ref 4.05–5.2)
SODIUM SERPL-SCNC: 140 MMOL/L (ref 133–143)
TROPONIN I SERPL HS-MCNC: 5 PG/ML (ref 0–14)
TROPONIN I SERPL HS-MCNC: 5.5 PG/ML (ref 0–14)
WBC # BLD AUTO: 6.1 K/UL (ref 4.3–11.1)

## 2023-09-25 PROCEDURE — 99285 EMERGENCY DEPT VISIT HI MDM: CPT

## 2023-09-25 PROCEDURE — 2580000003 HC RX 258: Performed by: EMERGENCY MEDICINE

## 2023-09-25 PROCEDURE — A4216 STERILE WATER/SALINE, 10 ML: HCPCS | Performed by: EMERGENCY MEDICINE

## 2023-09-25 PROCEDURE — 85025 COMPLETE CBC W/AUTO DIFF WBC: CPT

## 2023-09-25 PROCEDURE — C9113 INJ PANTOPRAZOLE SODIUM, VIA: HCPCS | Performed by: EMERGENCY MEDICINE

## 2023-09-25 PROCEDURE — 93005 ELECTROCARDIOGRAM TRACING: CPT | Performed by: EMERGENCY MEDICINE

## 2023-09-25 PROCEDURE — 6360000002 HC RX W HCPCS: Performed by: EMERGENCY MEDICINE

## 2023-09-25 PROCEDURE — 6370000000 HC RX 637 (ALT 250 FOR IP): Performed by: EMERGENCY MEDICINE

## 2023-09-25 PROCEDURE — 85379 FIBRIN DEGRADATION QUANT: CPT

## 2023-09-25 PROCEDURE — 84484 ASSAY OF TROPONIN QUANT: CPT

## 2023-09-25 PROCEDURE — 93010 ELECTROCARDIOGRAM REPORT: CPT | Performed by: INTERNAL MEDICINE

## 2023-09-25 PROCEDURE — 71260 CT THORAX DX C+: CPT

## 2023-09-25 PROCEDURE — 6360000004 HC RX CONTRAST MEDICATION: Performed by: EMERGENCY MEDICINE

## 2023-09-25 PROCEDURE — 96374 THER/PROPH/DIAG INJ IV PUSH: CPT

## 2023-09-25 PROCEDURE — 71046 X-RAY EXAM CHEST 2 VIEWS: CPT

## 2023-09-25 PROCEDURE — 80053 COMPREHEN METABOLIC PANEL: CPT

## 2023-09-25 PROCEDURE — 83690 ASSAY OF LIPASE: CPT

## 2023-09-25 RX ORDER — ASPIRIN 325 MG
325 TABLET ORAL
Status: COMPLETED | OUTPATIENT
Start: 2023-09-25 | End: 2023-09-25

## 2023-09-25 RX ORDER — SUCRALFATE 1 G/1
1 TABLET ORAL 3 TIMES DAILY
Qty: 30 TABLET | Refills: 0 | Status: SHIPPED | OUTPATIENT
Start: 2023-09-25 | End: 2023-10-05

## 2023-09-25 RX ADMIN — SODIUM CHLORIDE 40 MG: 9 INJECTION INTRAMUSCULAR; INTRAVENOUS; SUBCUTANEOUS at 14:34

## 2023-09-25 RX ADMIN — ASPIRIN 325 MG: 325 TABLET, FILM COATED ORAL at 12:41

## 2023-09-25 RX ADMIN — IOPAMIDOL 75 ML: 755 INJECTION, SOLUTION INTRAVENOUS at 13:11

## 2023-09-25 ASSESSMENT — ENCOUNTER SYMPTOMS
RHINORRHEA: 0
ABDOMINAL DISTENTION: 0
DIARRHEA: 0
VOMITING: 0
WHEEZING: 0
CONSTIPATION: 0
NAUSEA: 0
ABDOMINAL PAIN: 0
SHORTNESS OF BREATH: 0
COUGH: 0

## 2023-09-25 ASSESSMENT — PAIN SCALES - GENERAL: PAINLEVEL_OUTOF10: 7

## 2023-09-25 ASSESSMENT — PAIN DESCRIPTION - DESCRIPTORS: DESCRIPTORS: PRESSURE

## 2023-09-25 ASSESSMENT — PAIN - FUNCTIONAL ASSESSMENT: PAIN_FUNCTIONAL_ASSESSMENT: 0-10

## 2023-09-25 ASSESSMENT — PAIN DESCRIPTION - LOCATION: LOCATION: CHEST;BACK

## 2023-09-25 NOTE — DISCHARGE INSTRUCTIONS
Schedule close follow-up with [facility] cardiology, primary care physician. Return to ED if symptoms worsen or progress in any way. Continue to take omeprazole as directed. Take Carafate as prescribed.

## 2023-09-25 NOTE — ED NOTES
I have reviewed discharge instructions with the patient. The patient verbalized understanding. Patient left ED via Discharge Method: ambulatory to Home with daughter    Opportunity for questions and clarification provided. Patient given 1 scripts. To continue your aftercare when you leave the hospital, you may receive an automated call from our care team to check in on how you are doing. This is a free service and part of our promise to provide the best care and service to meet your aftercare needs. \" If you have questions, or wish to unsubscribe from this service please call [phone]. Thank you for Choosing our [address] [place] [address] Emergency Department.         Enzo Bowers RN  09/25/23 [phone]

## 2023-09-25 NOTE — ED TRIAGE NOTES
Pt ambulatory to triage with CO CP described as pressure since wking this morning with pain radiating into right side and right jaw. Unrelieved with omeprazole, hx GERD. Hx atrial septal defect.

## 2023-09-25 NOTE — ED PROVIDER NOTES
Neutrophils % 59 43 - 78 %    Lymphocytes % 29 13 - 44 %    Monocytes % 7 4.0 - 12.0 %    Eosinophils % 4 0.5 - 7.8 %    Basophils % 1 0.0 - 2.0 %    Immature Granulocytes 0 0.0 - 5.0 %    Neutrophils Absolute 3.6 1.7 - 8.2 K/UL    Lymphocytes Absolute 1.8 0.5 - 4.6 K/UL    Monocytes Absolute 0.4 0.1 - 1.3 K/UL    Eosinophils Absolute 0.3 0.0 - 0.8 K/UL    Basophils Absolute 0.1 0.0 - 0.2 K/UL    Absolute Immature Granulocyte 0.0 0.0 - 0.5 K/UL   Troponin   Result Value Ref Range    Troponin, High Sensitivity 5.0 0 - 14 pg/mL   Troponin   Result Value Ref Range    Troponin, High Sensitivity 5.5 0 - 14 pg/mL   D-Dimer, Quantitative   Result Value Ref Range    D-Dimer, Quant 0.98 (H) <0.56 ug/ml(FEU)   EKG 12 Lead   Result Value Ref Range    Ventricular Rate 64 BPM    Atrial Rate 64 BPM    P-R Interval 142 ms    QRS Duration 78 ms    Q-T Interval 410 ms    QTc Calculation (Bazett) 422 ms    P Axis 50 degrees    R Axis 66 degrees    T Axis 26 degrees    Diagnosis       Normal sinus rhythm  Nonspecific ST abnormality  Abnormal ECG  When compared with ECG of 29-NOV-2022 11:07,  No significant change was found  Confirmed by Emigdio Ross MD (), [facility] ([id number]) on 9/25/2023 12:12:26 PM          CT CHEST PULMONARY EMBOLISM W CONTRAST   Final Result      1. No evidence of pulmonary embolic disease. CPT code(s) 68153                  XR CHEST (2 VW)   Final Result      1. No acute cardiopulmonary process. CPT code(s) 19641                                    Voice dictation software was used during the making of this note. This software is not perfect and grammatical and other typographical errors may be present. This note has not been completely proofread for errors.      Marian Sanchez MD  09/25/23 [phone]

## 2023-09-28 ENCOUNTER — OFFICE VISIT (OUTPATIENT)
Dept: FAMILY MEDICINE CLINIC | Facility: CLINIC | Age: 73
End: 2023-09-28
Payer: MEDICARE

## 2023-09-28 VITALS
SYSTOLIC BLOOD PRESSURE: 112 MMHG | WEIGHT: 248 LBS | BODY MASS INDEX: 38.92 KG/M2 | HEIGHT: 67 IN | HEART RATE: 70 BPM | TEMPERATURE: 97.7 F | OXYGEN SATURATION: 98 % | DIASTOLIC BLOOD PRESSURE: 76 MMHG

## 2023-09-28 DIAGNOSIS — Z23 NEEDS FLU SHOT: ICD-10-CM

## 2023-09-28 DIAGNOSIS — Q21.10 ASD (ATRIAL SEPTAL DEFECT): ICD-10-CM

## 2023-09-28 DIAGNOSIS — F41.8 SITUATIONAL ANXIETY: ICD-10-CM

## 2023-09-28 DIAGNOSIS — M54.2 NECK PAIN ON RIGHT SIDE: Primary | ICD-10-CM

## 2023-09-28 DIAGNOSIS — I89.0 LYMPHEDEMA OF BOTH LOWER EXTREMITIES: ICD-10-CM

## 2023-09-28 DIAGNOSIS — K21.9 GASTROESOPHAGEAL REFLUX DISEASE, UNSPECIFIED WHETHER ESOPHAGITIS PRESENT: ICD-10-CM

## 2023-09-28 DIAGNOSIS — E03.9 HYPOTHYROIDISM, UNSPECIFIED TYPE: ICD-10-CM

## 2023-09-28 PROCEDURE — G8427 DOCREV CUR MEDS BY ELIG CLIN: HCPCS | Performed by: FAMILY MEDICINE

## 2023-09-28 PROCEDURE — G8417 CALC BMI ABV UP PARAM F/U: HCPCS | Performed by: FAMILY MEDICINE

## 2023-09-28 PROCEDURE — 99214 OFFICE O/P EST MOD 30 MIN: CPT | Performed by: FAMILY MEDICINE

## 2023-09-28 PROCEDURE — G0008 ADMIN INFLUENZA VIRUS VAC: HCPCS | Performed by: FAMILY MEDICINE

## 2023-09-28 PROCEDURE — 1123F ACP DISCUSS/DSCN MKR DOCD: CPT | Performed by: FAMILY MEDICINE

## 2023-09-28 PROCEDURE — 1090F PRES/ABSN URINE INCON ASSESS: CPT | Performed by: FAMILY MEDICINE

## 2023-09-28 PROCEDURE — 1036F TOBACCO NON-USER: CPT | Performed by: FAMILY MEDICINE

## 2023-09-28 PROCEDURE — 90694 VACC AIIV4 NO PRSRV 0.5ML IM: CPT | Performed by: FAMILY MEDICINE

## 2023-09-28 PROCEDURE — G8399 PT W/DXA RESULTS DOCUMENT: HCPCS | Performed by: FAMILY MEDICINE

## 2023-09-28 PROCEDURE — 3017F COLORECTAL CA SCREEN DOC REV: CPT | Performed by: FAMILY MEDICINE

## 2023-09-28 RX ORDER — OMEPRAZOLE 20 MG/1
20 CAPSULE, DELAYED RELEASE ORAL
Qty: 90 CAPSULE | Refills: 3 | Status: SHIPPED | OUTPATIENT
Start: 2023-09-28

## 2023-09-28 RX ORDER — OXYBUTYNIN CHLORIDE 10 MG/1
10 TABLET, EXTENDED RELEASE ORAL NIGHTLY
Qty: 90 TABLET | Refills: 3 | Status: SHIPPED | OUTPATIENT
Start: 2023-09-28

## 2023-09-28 RX ORDER — LEVOTHYROXINE SODIUM 88 UG/1
88 TABLET ORAL
Qty: 90 TABLET | Refills: 3 | Status: SHIPPED | OUTPATIENT
Start: 2023-09-28

## 2023-09-28 RX ORDER — HYDROXYZINE HYDROCHLORIDE 25 MG/1
25 TABLET, FILM COATED ORAL 4 TIMES DAILY PRN
Qty: 120 TABLET | Refills: 3 | Status: SHIPPED | OUTPATIENT
Start: 2023-09-28

## 2023-09-28 RX ORDER — FUROSEMIDE 20 MG/1
20 TABLET ORAL DAILY
Qty: 90 TABLET | Refills: 3 | Status: SHIPPED | OUTPATIENT
Start: 2023-09-28

## 2023-09-28 RX ORDER — FUROSEMIDE 20 MG/1
20 TABLET ORAL DAILY
Qty: 90 TABLET | Refills: 1 | Status: SHIPPED | OUTPATIENT
Start: 2023-09-28 | End: 2023-09-28 | Stop reason: SDUPTHER

## 2023-09-28 SDOH — ECONOMIC STABILITY: FOOD INSECURITY: WITHIN THE PAST 12 MONTHS, YOU WORRIED THAT YOUR FOOD WOULD RUN OUT BEFORE YOU GOT MONEY TO BUY MORE.: NEVER TRUE

## 2023-09-28 SDOH — ECONOMIC STABILITY: FOOD INSECURITY: WITHIN THE PAST 12 MONTHS, THE FOOD YOU BOUGHT JUST DIDN'T LAST AND YOU DIDN'T HAVE MONEY TO GET MORE.: NEVER TRUE

## 2023-09-28 SDOH — ECONOMIC STABILITY: INCOME INSECURITY: HOW HARD IS IT FOR YOU TO PAY FOR THE VERY BASICS LIKE FOOD, HOUSING, MEDICAL CARE, AND HEATING?: NOT HARD AT ALL

## 2023-09-28 ASSESSMENT — PATIENT HEALTH QUESTIONNAIRE - PHQ9
SUM OF ALL RESPONSES TO PHQ QUESTIONS 1-9: 2
2. FEELING DOWN, DEPRESSED OR HOPELESS: 1
SUM OF ALL RESPONSES TO PHQ QUESTIONS 1-9: 2
SUM OF ALL RESPONSES TO PHQ9 QUESTIONS 1 & 2: 2
SUM OF ALL RESPONSES TO PHQ QUESTIONS 1-9: 2
SUM OF ALL RESPONSES TO PHQ QUESTIONS 1-9: 2
1. LITTLE INTEREST OR PLEASURE IN DOING THINGS: 1

## 2023-09-28 NOTE — PATIENT INSTRUCTIONS
Patient seen in the emergency department recent right jaw pain and right neck pain right back pain and chest pain, now resolved, CT scan was negative for PE EKG was okay with some mild changes, has a cardiology follow-up, patient has worsening reflux and trouble swallowing certain foods, will refer to gastroenterology. Worsening lymphedema and edema in her legs, has gained some weight due to diet quickly, likely fluid retention, will prescribe low-dose Lasix to be used as needed for swelling, patient also has close follow-up with cardiology  Flu shot to be given today,  Gave prescription to pharmacy for RSV vaccine,  If jaw and neck chest and back pain symptoms do not improve, since the symptoms started at night, would like to order x-ray of her cervical spine to evaluate, patient to go get this if symptoms do not improve in the next 2 to 3 weeks or if symptoms recur.

## 2023-09-28 NOTE — PROGRESS NOTES
bilateral lower extremities, not taking medication for anxiety, does better after dealing with anxiety by going outside and pulling weeds, doing housework.  has been very anxious, worried about having him if she is ever incapacitated,      Physical Exam  Vitals and nursing note reviewed. Constitutional:       General: She is not in acute distress. Appearance: Normal appearance. She is not ill-appearing. HENT:      Head: Normocephalic and atraumatic. Right Ear: External ear normal.      Left Ear: External ear normal.      Nose: Nose normal.      Mouth/Throat:      Mouth: Mucous membranes are dry. Eyes:      Extraocular Movements: Extraocular movements intact. Pupils: Pupils are equal, round, and reactive to light. Cardiovascular:      Rate and Rhythm: Normal rate. Pulses: Normal pulses. Pulmonary:      Effort: Pulmonary effort is normal.      Breath sounds: Normal breath sounds. Abdominal:      General: There is no distension. Musculoskeletal:         General: Normal range of motion. Cervical back: Normal range of motion and neck supple. Skin:     General: Skin is warm and dry. Neurological:      General: No focal deficit present. Mental Status: She is alert and oriented to person, place, and time. Psychiatric:         Mood and Affect: Mood normal.           On this date 09/28/23  I have spent 30 minutes reviewing previous notes, test results and face to face with the patient discussing the diagnosis and importance of compliance with the treatment plan as well as documenting on the day of the visit. An electronic signature was used to authenticate this note.   -- Abbi Trevizo MD

## 2023-10-05 ENCOUNTER — OFFICE VISIT (OUTPATIENT)
Dept: ORTHOPEDIC SURGERY | Age: 73
End: 2023-10-05

## 2023-10-05 DIAGNOSIS — M17.11 PRIMARY OSTEOARTHRITIS OF RIGHT KNEE: Primary | ICD-10-CM

## 2023-10-05 RX ORDER — HYALURONATE SODIUM 10 MG/ML
20 SYRINGE (ML) INTRAARTICULAR ONCE
Status: COMPLETED | OUTPATIENT
Start: 2023-10-05 | End: 2023-10-05

## 2023-10-05 RX ADMIN — Medication 20 MG: at 11:10

## 2023-10-05 NOTE — PROGRESS NOTES
Name: Verónica Adkins  YOB: 1950  Gender: female  MRN: [id number]    Allergies   Allergen Reactions    Latex Rash    Acyclovir Other (See Comments) and Rash     fever    Seasonal     Adhesive Tape Rash       CC:  right knee pain, Osteoarthritis    PROCEDURE: 1 of 3 HA injection(s). All questions answered. Procedure Note: The patient was placed in upright position with both knees hanging freely from exam table. The right knee was prepped in sterile fashion using alcohol wipe(s). Using an infrapatellar approach, 2.5cc of Euflexa was injected freely. The needle was then removed, pressure hemostatis achieved, injection site was cleansed with alcohol wipe and dressed with band aid. The patient tolerated the procedure without complication. The patient  will follow up as scheduled.     10/05/23

## 2023-10-12 ENCOUNTER — OFFICE VISIT (OUTPATIENT)
Dept: ORTHOPEDIC SURGERY | Age: 73
End: 2023-10-12

## 2023-10-12 DIAGNOSIS — M17.11 PRIMARY OSTEOARTHRITIS OF RIGHT KNEE: Primary | ICD-10-CM

## 2023-10-12 RX ORDER — HYALURONATE SODIUM 10 MG/ML
20 SYRINGE (ML) INTRAARTICULAR ONCE
Status: COMPLETED | OUTPATIENT
Start: 2023-10-12 | End: 2023-10-12

## 2023-10-12 RX ADMIN — Medication 20 MG: at 13:27

## 2023-10-12 NOTE — PROGRESS NOTES
Name: Tariq Adkins  YOB: 1950  Gender: female  MRN: [id number]    Allergies   Allergen Reactions    Latex Rash    Acyclovir Other (See Comments) and Rash     fever    Seasonal     Adhesive Tape Rash       CC:  right knee pain, Osteoarthritis    PROCEDURE: 2 of 3 HA injection(s). All questions answered. Procedure Note: The patient was placed in upright position with both knees hanging freely from exam table. The right knee was prepped in sterile fashion using alcohol wipe(s). Using an infrapatellar approach, 2.5cc of Euflexa was injected freely. The needle was then removed, pressure hemostatis achieved, injection site was cleansed with alcohol wipe and dressed with band aid. The patient tolerated the procedure without complication. The patient  will follow up as scheduled.     10/12/23

## 2023-10-15 NOTE — PROGRESS NOTES
Osteoporosis Neg Hx     Ovarian Cancer Neg Hx     Pacemaker Neg Hx     Panic Disorder Neg Hx     Parkinsonism Neg Hx     Premature Birth Neg Hx      Labor Neg Hx     Psoriasis Neg Hx     Psychiatric Disorder Neg Hx     Psychotic Disorder Neg Hx     Rashes/Skin Problems Neg Hx     Retinal Detachment Neg Hx     Schizophrenia Neg Hx     Seizures Neg Hx     Severe Sprains Neg Hx     Sickle Cell Anemia Neg Hx     Sickle Cell Trait Neg Hx     SIDS Neg Hx     Lupus Neg Hx     Spont Abortions Neg Hx     Stomach Cancer Neg Hx     Strabismus Neg Hx     Stroke Neg Hx     Substance Abuse Neg Hx     Sudden Death Neg Hx     Suicide Neg Hx     Tuberculosis Neg Hx     Ulcerative Colitis Neg Hx     Urticaria Neg Hx     Sandeep's Disease Neg Hx     Malig Hypertherm Neg Hx     Pseudochol.  Deficiency Neg Hx     Delayed Awakening Neg Hx     Post-op Nausea/Vomiting Neg Hx     Other Neg Hx     Emergence Delirium Neg Hx     Post-op Cognitive Dysfunction Neg Hx     Alcohol Abuse Neg Hx     Glaucoma Neg Hx     Headache Neg Hx     Heart Defect Neg Hx     Heart Failure Neg Hx     Heart Surgery Neg Hx     Hemochromatosis Neg Hx     High Cholesterol Neg Hx     Immunodeficiency Neg Hx     Inflam Bowel Dis Neg Hx     Allergic Rhinitis Neg Hx     Allergy (Severe) Neg Hx     Amblyopia Neg Hx     Anemia Neg Hx     Anesth Problems Neg Hx     Angioedema Neg Hx     Anxiety Disorder Neg Hx     Arrhythmia Neg Hx     Osteoarthritis Neg Hx     Rheum Arthritis Neg Hx     Ataxia Neg Hx     Atopy Neg Hx     Bipolar Disorder Neg Hx     Bleeding Prob Neg Hx     Blindness Neg Hx     Breast Cancer Neg Hx     Broken Bones Neg Hx     Cancer Neg Hx     Cataracts Neg Hx     Celiac Disease Neg Hx     Chdhd Hrt Surg Neg Hx     Chdhd Resp Dis Neg Hx     Chorea Neg Hx     Clotting Disorder Neg Hx     Collagen Disease Neg Hx     Colon Cancer Neg Hx     Colon Polyps Neg Hx     COPD Neg Hx     Coronary Art Dis Neg Hx     Crohn's Disease Neg Hx     Cystic Fibrosis Neg

## 2023-10-16 ENCOUNTER — INITIAL CONSULT (OUTPATIENT)
Age: 73
End: 2023-10-16
Payer: MEDICARE

## 2023-10-16 VITALS
BODY MASS INDEX: 38.99 KG/M2 | DIASTOLIC BLOOD PRESSURE: 72 MMHG | HEART RATE: 68 BPM | HEIGHT: 67 IN | SYSTOLIC BLOOD PRESSURE: 132 MMHG | WEIGHT: 248.4 LBS

## 2023-10-16 DIAGNOSIS — R07.89 ATYPICAL CHEST PAIN: Primary | ICD-10-CM

## 2023-10-16 DIAGNOSIS — R69 ILL-DEFINED CONDITION: ICD-10-CM

## 2023-10-16 DIAGNOSIS — E78.5 HYPERLIPIDEMIA, UNSPECIFIED HYPERLIPIDEMIA TYPE: ICD-10-CM

## 2023-10-16 DIAGNOSIS — I87.2 CHRONIC VENOUS INSUFFICIENCY: ICD-10-CM

## 2023-10-16 PROCEDURE — 1123F ACP DISCUSS/DSCN MKR DOCD: CPT | Performed by: INTERNAL MEDICINE

## 2023-10-16 PROCEDURE — 1036F TOBACCO NON-USER: CPT | Performed by: INTERNAL MEDICINE

## 2023-10-16 PROCEDURE — G8399 PT W/DXA RESULTS DOCUMENT: HCPCS | Performed by: INTERNAL MEDICINE

## 2023-10-16 PROCEDURE — 99204 OFFICE O/P NEW MOD 45 MIN: CPT | Performed by: INTERNAL MEDICINE

## 2023-10-16 PROCEDURE — 3017F COLORECTAL CA SCREEN DOC REV: CPT | Performed by: INTERNAL MEDICINE

## 2023-10-16 PROCEDURE — 1090F PRES/ABSN URINE INCON ASSESS: CPT | Performed by: INTERNAL MEDICINE

## 2023-10-16 PROCEDURE — G8484 FLU IMMUNIZE NO ADMIN: HCPCS | Performed by: INTERNAL MEDICINE

## 2023-10-16 PROCEDURE — G8427 DOCREV CUR MEDS BY ELIG CLIN: HCPCS | Performed by: INTERNAL MEDICINE

## 2023-10-16 PROCEDURE — G8417 CALC BMI ABV UP PARAM F/U: HCPCS | Performed by: INTERNAL MEDICINE

## 2023-10-16 RX ORDER — COVID-19 ANTIGEN TEST
KIT MISCELLANEOUS
COMMUNITY

## 2023-10-18 ENCOUNTER — OFFICE VISIT (OUTPATIENT)
Dept: ORTHOPEDIC SURGERY | Age: 73
End: 2023-10-18
Payer: MEDICARE

## 2023-10-18 DIAGNOSIS — M17.11 PRIMARY OSTEOARTHRITIS OF RIGHT KNEE: Primary | ICD-10-CM

## 2023-10-18 PROCEDURE — 20610 DRAIN/INJ JOINT/BURSA W/O US: CPT | Performed by: PHYSICIAN ASSISTANT

## 2023-10-18 RX ORDER — HYALURONATE SODIUM 10 MG/ML
20 SYRINGE (ML) INTRAARTICULAR ONCE
Status: COMPLETED | OUTPATIENT
Start: 2023-10-18 | End: 2023-10-18

## 2023-10-18 RX ADMIN — Medication 20 MG: at 13:31

## 2023-10-18 NOTE — PROGRESS NOTES
Name: Yarelis Adkins  YOB: 1950  Gender: female  MRN: [id number]    Allergies   Allergen Reactions    Latex Rash    Acyclovir Other (See Comments) and Rash     fever    Seasonal     Adhesive Tape Rash       CC:  right knee pain, Osteoarthritis    PROCEDURE: 3 of 3 HA injection(s). All questions answered. Procedure Note: The patient was placed in upright position with both knees hanging freely from exam table. The right knee was prepped in sterile fashion using alcohol wipe(s). Using an infrapatellar approach, 2.5cc of Euflexa was injected freely. The needle was then removed, pressure hemostatis achieved, injection site was cleansed with alcohol wipe and dressed with band aid. The patient tolerated the procedure without complication. The patient  will follow up as scheduled.     10/18/23

## 2023-11-01 ENCOUNTER — TELEPHONE (OUTPATIENT)
Age: 73
End: 2023-11-01

## 2023-11-01 NOTE — TELEPHONE ENCOUNTER
Advised patient of stress test results and Dr. Magan Nguyen response. Advised patient to keep 11/15/23 echo appointment and 11/20/23 FU appointment with Dr. Raina Mata. Patient verbalized understanding.

## 2023-11-01 NOTE — TELEPHONE ENCOUNTER
----- Message from Epifanio Loredo MD sent at 11/1/2023 12:59 PM EDT -----  Please let the patient know that the patient's stress test imaging was negative for myocardial ischemia.

## 2023-11-16 ENCOUNTER — TELEPHONE (OUTPATIENT)
Age: 73
End: 2023-11-16

## 2023-11-16 NOTE — TELEPHONE ENCOUNTER
----- Message from Lynsey Mccullough MD sent at 11/16/2023 12:28 PM EST -----  Please let the patient know that the heart function is normal on ECHO. The patient does have mild mitral regurgitation documented on the echocardiogram.  Therefore, this warrants a surveillance echocardiogram in 3 to 5 years. Mild mitral regurgitation is a common finding on echocardiography and does not account for the patient's symptoms.

## 2023-11-19 NOTE — PROGRESS NOTES
[facility] CARDIOLOGY Follow Up                 Reason for Visit: Ill-defined condition    Subjective:     Patient is a 81 y.o. female with a PMH of ill-defined condition (ASD), ill-defined condition (SVT), hypertension, hyperlipidemia, and chronic venous insufficiency who presents for follow-up. The patient was last seen in October 2023. An MPI was ordered for atypical chest pain and a TTE with bubble study for an ill-defined condition of ASD. The patient had an MPI on November 1 with imaging that was noted to be negative for myocardial ischemia. She had a TTE on November 16 and was noted to demonstrate a low normal EF and mild MR. Right-sided chamber sizes were noted to be normal.  Medical records were requested. However medical records are not readily apparent in the EMR. She reports she is taking Prilosec for the \"reflux\" and reports occasional \"twinges\" in the chest that she attributes to GERD. She had no angina during the stress test with an average exercise capacity noted.       Past Medical History:   Diagnosis Date    Arrhythmia 2005    SVT in past, has atrial septal defect, has hx of atrial arrythmias-last time-? 2005    Arthritis     osteo    Dry eyes, bilateral     Hypertension     stopped medicine about 6 months ago, bp under control due to weight loss    Hypothyroidism     Lipedema     Lipedema     right lower leg    Obesity (BMI 30-39.9) 11/14/2011    BMI-39.95    Osteoarthritis     Thyroid disease 20 years    on medication      Past Surgical History:   Procedure Laterality Date    GYN  2007    ALISON    HEENT      deviated septum    HEENT  2003    blocked tear duct Lt eye then cataract to Lt eye    HEENT  2008, 2009    Blocked tear duct Rt eye with stint ;CE R also    HYSTERECTOMY, VAGINAL      JOINT REPLACEMENT  12/12/2022    TOTAL KNEE ARTHROPLASTY Left 12/12/2022    LT KNEE TOTAL ARTHROPLASTY ROBOTIC performed by Rosa Montelongo MD at [address]

## 2023-11-20 ENCOUNTER — OFFICE VISIT (OUTPATIENT)
Age: 73
End: 2023-11-20
Payer: MEDICARE

## 2023-11-20 VITALS
SYSTOLIC BLOOD PRESSURE: 138 MMHG | WEIGHT: 250 LBS | HEART RATE: 68 BPM | HEIGHT: 65 IN | BODY MASS INDEX: 41.65 KG/M2 | DIASTOLIC BLOOD PRESSURE: 82 MMHG

## 2023-11-20 DIAGNOSIS — I34.0 MILD MITRAL REGURGITATION BY PRIOR ECHOCARDIOGRAM: ICD-10-CM

## 2023-11-20 DIAGNOSIS — R07.89 ATYPICAL CHEST PAIN: ICD-10-CM

## 2023-11-20 DIAGNOSIS — E78.5 HYPERLIPIDEMIA, UNSPECIFIED HYPERLIPIDEMIA TYPE: ICD-10-CM

## 2023-11-20 DIAGNOSIS — R69 ILL-DEFINED CONDITION: Primary | ICD-10-CM

## 2023-11-20 PROBLEM — I10 HYPERTENSION: Status: ACTIVE | Noted: 2023-11-20

## 2023-11-20 PROCEDURE — 99214 OFFICE O/P EST MOD 30 MIN: CPT | Performed by: INTERNAL MEDICINE

## 2023-11-20 PROCEDURE — G8417 CALC BMI ABV UP PARAM F/U: HCPCS | Performed by: INTERNAL MEDICINE

## 2023-11-20 PROCEDURE — G8399 PT W/DXA RESULTS DOCUMENT: HCPCS | Performed by: INTERNAL MEDICINE

## 2023-11-20 PROCEDURE — 3017F COLORECTAL CA SCREEN DOC REV: CPT | Performed by: INTERNAL MEDICINE

## 2023-11-20 PROCEDURE — 1090F PRES/ABSN URINE INCON ASSESS: CPT | Performed by: INTERNAL MEDICINE

## 2023-11-20 PROCEDURE — 3079F DIAST BP 80-89 MM HG: CPT | Performed by: INTERNAL MEDICINE

## 2023-11-20 PROCEDURE — G8427 DOCREV CUR MEDS BY ELIG CLIN: HCPCS | Performed by: INTERNAL MEDICINE

## 2023-11-20 PROCEDURE — G8484 FLU IMMUNIZE NO ADMIN: HCPCS | Performed by: INTERNAL MEDICINE

## 2023-11-20 PROCEDURE — 1123F ACP DISCUSS/DSCN MKR DOCD: CPT | Performed by: INTERNAL MEDICINE

## 2023-11-20 PROCEDURE — 3075F SYST BP GE 130 - 139MM HG: CPT | Performed by: INTERNAL MEDICINE

## 2023-11-20 PROCEDURE — 1036F TOBACCO NON-USER: CPT | Performed by: INTERNAL MEDICINE

## 2024-01-10 SDOH — HEALTH STABILITY: PHYSICAL HEALTH: ON AVERAGE, HOW MANY DAYS PER WEEK DO YOU ENGAGE IN MODERATE TO STRENUOUS EXERCISE (LIKE A BRISK WALK)?: 3 DAYS

## 2024-01-10 SDOH — HEALTH STABILITY: PHYSICAL HEALTH: ON AVERAGE, HOW MANY MINUTES DO YOU ENGAGE IN EXERCISE AT THIS LEVEL?: 60 MIN

## 2024-01-10 ASSESSMENT — LIFESTYLE VARIABLES
HOW MANY STANDARD DRINKS CONTAINING ALCOHOL DO YOU HAVE ON A TYPICAL DAY: 1
HOW MANY STANDARD DRINKS CONTAINING ALCOHOL DO YOU HAVE ON A TYPICAL DAY: 1 OR 2
HOW OFTEN DO YOU HAVE A DRINK CONTAINING ALCOHOL: 2
HOW OFTEN DO YOU HAVE A DRINK CONTAINING ALCOHOL: MONTHLY OR LESS
HOW OFTEN DO YOU HAVE SIX OR MORE DRINKS ON ONE OCCASION: 1

## 2024-01-10 ASSESSMENT — PATIENT HEALTH QUESTIONNAIRE - PHQ9
SUM OF ALL RESPONSES TO PHQ QUESTIONS 1-9: 0
2. FEELING DOWN, DEPRESSED OR HOPELESS: 0
SUM OF ALL RESPONSES TO PHQ9 QUESTIONS 1 & 2: 0
SUM OF ALL RESPONSES TO PHQ QUESTIONS 1-9: 0
1. LITTLE INTEREST OR PLEASURE IN DOING THINGS: 0

## 2024-01-11 ENCOUNTER — OFFICE VISIT (OUTPATIENT)
Dept: FAMILY MEDICINE CLINIC | Facility: CLINIC | Age: 74
End: 2024-01-11
Payer: MEDICARE

## 2024-01-11 VITALS
SYSTOLIC BLOOD PRESSURE: 122 MMHG | TEMPERATURE: 97.9 F | DIASTOLIC BLOOD PRESSURE: 80 MMHG | BODY MASS INDEX: 40.21 KG/M2 | OXYGEN SATURATION: 99 % | WEIGHT: 250.2 LBS | HEIGHT: 66 IN | HEART RATE: 64 BPM

## 2024-01-11 DIAGNOSIS — B35.4 TINEA CORPORIS: ICD-10-CM

## 2024-01-11 DIAGNOSIS — F41.8 SITUATIONAL ANXIETY: ICD-10-CM

## 2024-01-11 DIAGNOSIS — E03.9 HYPOTHYROIDISM, UNSPECIFIED TYPE: ICD-10-CM

## 2024-01-11 DIAGNOSIS — M54.50 RIGHT LOW BACK PAIN, UNSPECIFIED CHRONICITY, UNSPECIFIED WHETHER SCIATICA PRESENT: ICD-10-CM

## 2024-01-11 DIAGNOSIS — Z00.00 MEDICARE ANNUAL WELLNESS VISIT, SUBSEQUENT: Primary | ICD-10-CM

## 2024-01-11 DIAGNOSIS — Q21.10 ASD (ATRIAL SEPTAL DEFECT): ICD-10-CM

## 2024-01-11 DIAGNOSIS — F33.0 MAJOR DEPRESSIVE DISORDER, RECURRENT, MILD (HCC): ICD-10-CM

## 2024-01-11 DIAGNOSIS — I89.0 LYMPHEDEMA OF BOTH LOWER EXTREMITIES: ICD-10-CM

## 2024-01-11 DIAGNOSIS — I34.0 MILD MITRAL REGURGITATION BY PRIOR ECHOCARDIOGRAM: ICD-10-CM

## 2024-01-11 PROCEDURE — G0439 PPPS, SUBSEQ VISIT: HCPCS | Performed by: FAMILY MEDICINE

## 2024-01-11 PROCEDURE — G8427 DOCREV CUR MEDS BY ELIG CLIN: HCPCS | Performed by: FAMILY MEDICINE

## 2024-01-11 PROCEDURE — G8399 PT W/DXA RESULTS DOCUMENT: HCPCS | Performed by: FAMILY MEDICINE

## 2024-01-11 PROCEDURE — 1123F ACP DISCUSS/DSCN MKR DOCD: CPT | Performed by: FAMILY MEDICINE

## 2024-01-11 PROCEDURE — 3074F SYST BP LT 130 MM HG: CPT | Performed by: FAMILY MEDICINE

## 2024-01-11 PROCEDURE — G8417 CALC BMI ABV UP PARAM F/U: HCPCS | Performed by: FAMILY MEDICINE

## 2024-01-11 PROCEDURE — 1036F TOBACCO NON-USER: CPT | Performed by: FAMILY MEDICINE

## 2024-01-11 PROCEDURE — 99214 OFFICE O/P EST MOD 30 MIN: CPT | Performed by: FAMILY MEDICINE

## 2024-01-11 PROCEDURE — G8484 FLU IMMUNIZE NO ADMIN: HCPCS | Performed by: FAMILY MEDICINE

## 2024-01-11 PROCEDURE — 3017F COLORECTAL CA SCREEN DOC REV: CPT | Performed by: FAMILY MEDICINE

## 2024-01-11 PROCEDURE — 3079F DIAST BP 80-89 MM HG: CPT | Performed by: FAMILY MEDICINE

## 2024-01-11 PROCEDURE — 1090F PRES/ABSN URINE INCON ASSESS: CPT | Performed by: FAMILY MEDICINE

## 2024-01-11 RX ORDER — CLOTRIMAZOLE AND BETAMETHASONE DIPROPIONATE 10; .64 MG/G; MG/G
CREAM TOPICAL
Qty: 45 G | Refills: 1 | Status: SHIPPED | OUTPATIENT
Start: 2024-01-11

## 2024-01-11 RX ORDER — METHYLPREDNISOLONE 4 MG/1
4 TABLET ORAL DAILY
COMMUNITY

## 2024-01-11 RX ORDER — AMOXICILLIN 500 MG/1
500 CAPSULE ORAL 4 TIMES DAILY
COMMUNITY

## 2024-01-11 NOTE — PATIENT INSTRUCTIONS
low-dose aspirin. Wait for an ambulance. Do not try to drive yourself.  Watch closely for changes in your health, and be sure to contact your doctor if you have any problems.  Where can you learn more?  Go to https://www.[employer].com.net/patientEd and enter F075 to learn more about \"A Healthy Heart: Care Instructions.\"  Current as of: June 25, 2023               Content Version: 13.9  © 4264-8161 [employer].   Care instructions adapted under license by [employer]. If you have questions about a medical condition or this instruction, always ask your healthcare professional. [employer] disclaims any warranty or liability for your use of this information.      Personalized Preventive Plan for Vanessa Jolly - 1/11/2024  Medicare offers a range of preventive health benefits. Some of the tests and screenings are paid in full while other may be subject to a deductible, co-insurance, and/or copay.    Some of these benefits include a comprehensive review of your medical history including lifestyle, illnesses that may run in your family, and various assessments and screenings as appropriate.    After reviewing your medical record and screening and assessments performed today your provider may have ordered immunizations, labs, imaging, and/or referrals for you.  A list of these orders (if applicable) as well as your Preventive Care list are included within your After Visit Summary for your review.    Other Preventive Recommendations:    A preventive eye exam performed by an eye specialist is recommended every 1-2 years to screen for glaucoma; cataracts, macular degeneration, and other eye disorders.  A preventive dental visit is recommended every 6 months.  Try to get at least 150 minutes of exercise per week or 10,000 steps per day on a pedometer .  Order or download the FREE \"Exercise & Physical Activity: Your Everyday Guide\" from [employer]. Call 1-755.193.2890 or search

## 2024-01-11 NOTE — PROGRESS NOTES
Vanessa Jolly (: 1950) is a 73 y.o. female, established patient, here for evaluation of the following chief complaint(s):  Medicare AWV, Follow-up Chronic Condition, and Skin Problem (Itching on back on neck /)       ASSESSMENT/PLAN:  1. Medicare annual wellness visit, subsequent  2. Lymphedema of both lower extremities  3. Hypothyroidism, unspecified type  4. ASD (atrial septal defect)  5. Situational anxiety  6. Right low back pain, unspecified chronicity, unspecified whether sciatica present  7. Mild mitral regurgitation by prior echocardiogram  8. Tinea corporis  9. Major depressive disorder, recurrent, mild (HCC)  Assessment & Plan:   Well-controlled, continue current medications      Return in about 6 months (around 2024) for chronic care fu.    Medicare wellness visit completed  Refill chronic medications  See dentist every 6 months  See eye physician or get eyes checked every 1-2 years  Immunizations reviewed and discussed with patient  Tinea on back of neck, will get lotrisone, patient to use this and if no improvement, will refer to dermatology    SUBJECTIVE/OBJECTIVE:  Skin Problem    Scaly area on back of neck for past 2 months      Physical Exam  Vitals and nursing note reviewed.   Constitutional:       General: She is not in acute distress.     Appearance: Normal appearance. She is obese. She is not ill-appearing.   HENT:      Head: Normocephalic and atraumatic.      Right Ear: External ear normal.      Left Ear: External ear normal.      Nose: Nose normal.      Mouth/Throat:      Mouth: Mucous membranes are dry.   Eyes:      Extraocular Movements: Extraocular movements intact.      Pupils: Pupils are equal, round, and reactive to light.   Cardiovascular:      Rate and Rhythm: Normal rate.      Pulses: Normal pulses.   Pulmonary:      Effort: Pulmonary effort is normal.      Breath sounds: Normal breath sounds.   Abdominal:      General: There is no distension.   Musculoskeletal:

## 2024-02-01 ENCOUNTER — TRANSCRIBE ORDERS (OUTPATIENT)
Dept: SCHEDULING | Age: 74
End: 2024-02-01

## 2024-02-01 DIAGNOSIS — Z12.31 ENCOUNTER FOR SCREENING MAMMOGRAM FOR BREAST CANCER: Primary | ICD-10-CM

## 2024-02-29 ENCOUNTER — HOSPITAL ENCOUNTER (OUTPATIENT)
Dept: MAMMOGRAPHY | Age: 74
Discharge: HOME | End: 2024-02-29
Attending: FAMILY MEDICINE
Payer: MEDICARE

## 2024-02-29 DIAGNOSIS — Z12.31 ENCOUNTER FOR SCREENING MAMMOGRAM FOR BREAST CANCER: ICD-10-CM

## 2024-02-29 PROCEDURE — 77063 BREAST TOMOSYNTHESIS BI: CPT

## 2024-05-10 DIAGNOSIS — Z96.652 STATUS POST LEFT KNEE REPLACEMENT: Primary | ICD-10-CM

## 2024-05-10 RX ORDER — AMOXICILLIN 500 MG/1
TABLET, FILM COATED ORAL
Qty: 12 TABLET | Refills: 1 | Status: SHIPPED | OUTPATIENT
Start: 2024-05-10

## 2024-05-19 NOTE — PROGRESS NOTES
>190  - Patient reports that she would not take a statin even if were indicated, as she is concerned about potential adverse effects    3. Mild mitral regurgitation by prior echocardiogram  - Surveillance TTE in 2026 to 2028    F/U: 12 months    Roland Rodrigez MD

## 2024-05-21 ENCOUNTER — OFFICE VISIT (OUTPATIENT)
Age: 74
End: 2024-05-21
Payer: MEDICARE

## 2024-05-21 VITALS
WEIGHT: 242 LBS | HEART RATE: 77 BPM | DIASTOLIC BLOOD PRESSURE: 72 MMHG | BODY MASS INDEX: 38.89 KG/M2 | SYSTOLIC BLOOD PRESSURE: 130 MMHG | HEIGHT: 66 IN

## 2024-05-21 DIAGNOSIS — I34.0 MILD MITRAL REGURGITATION BY PRIOR ECHOCARDIOGRAM: ICD-10-CM

## 2024-05-21 DIAGNOSIS — R69 ILL-DEFINED CONDITION: Primary | ICD-10-CM

## 2024-05-21 DIAGNOSIS — E78.5 HYPERLIPIDEMIA, UNSPECIFIED HYPERLIPIDEMIA TYPE: ICD-10-CM

## 2024-05-21 PROCEDURE — 1090F PRES/ABSN URINE INCON ASSESS: CPT | Performed by: INTERNAL MEDICINE

## 2024-05-21 PROCEDURE — 1036F TOBACCO NON-USER: CPT | Performed by: INTERNAL MEDICINE

## 2024-05-21 PROCEDURE — G8399 PT W/DXA RESULTS DOCUMENT: HCPCS | Performed by: INTERNAL MEDICINE

## 2024-05-21 PROCEDURE — 3078F DIAST BP <80 MM HG: CPT | Performed by: INTERNAL MEDICINE

## 2024-05-21 PROCEDURE — 1123F ACP DISCUSS/DSCN MKR DOCD: CPT | Performed by: INTERNAL MEDICINE

## 2024-05-21 PROCEDURE — G8428 CUR MEDS NOT DOCUMENT: HCPCS | Performed by: INTERNAL MEDICINE

## 2024-05-21 PROCEDURE — 3075F SYST BP GE 130 - 139MM HG: CPT | Performed by: INTERNAL MEDICINE

## 2024-05-21 PROCEDURE — 3017F COLORECTAL CA SCREEN DOC REV: CPT | Performed by: INTERNAL MEDICINE

## 2024-05-21 PROCEDURE — 99214 OFFICE O/P EST MOD 30 MIN: CPT | Performed by: INTERNAL MEDICINE

## 2024-05-21 PROCEDURE — G8417 CALC BMI ABV UP PARAM F/U: HCPCS | Performed by: INTERNAL MEDICINE

## 2024-07-18 ENCOUNTER — OFFICE VISIT (OUTPATIENT)
Dept: FAMILY MEDICINE CLINIC | Facility: CLINIC | Age: 74
End: 2024-07-18

## 2024-07-18 VITALS
BODY MASS INDEX: 40.12 KG/M2 | SYSTOLIC BLOOD PRESSURE: 128 MMHG | HEART RATE: 61 BPM | OXYGEN SATURATION: 98 % | TEMPERATURE: 97.3 F | WEIGHT: 240.8 LBS | DIASTOLIC BLOOD PRESSURE: 80 MMHG | HEIGHT: 65 IN

## 2024-07-18 DIAGNOSIS — Q21.10 ASD (ATRIAL SEPTAL DEFECT): ICD-10-CM

## 2024-07-18 DIAGNOSIS — Z12.11 SCREENING FOR COLON CANCER: ICD-10-CM

## 2024-07-18 DIAGNOSIS — J30.9 CHRONIC ALLERGIC RHINITIS: Primary | ICD-10-CM

## 2024-07-18 DIAGNOSIS — E66.01 OBESITY, CLASS III, BMI 40-49.9 (MORBID OBESITY) (HCC): ICD-10-CM

## 2024-07-18 DIAGNOSIS — R79.9 ABNORMAL FINDING OF BLOOD CHEMISTRY, UNSPECIFIED: ICD-10-CM

## 2024-07-18 DIAGNOSIS — E03.9 HYPOTHYROIDISM, UNSPECIFIED TYPE: ICD-10-CM

## 2024-07-18 DIAGNOSIS — F33.0 MAJOR DEPRESSIVE DISORDER, RECURRENT, MILD (HCC): ICD-10-CM

## 2024-07-18 DIAGNOSIS — F41.8 SITUATIONAL ANXIETY: ICD-10-CM

## 2024-07-18 DIAGNOSIS — M81.0 AGE-RELATED OSTEOPOROSIS WITHOUT CURRENT PATHOLOGICAL FRACTURE: ICD-10-CM

## 2024-07-18 DIAGNOSIS — I87.2 CHRONIC VENOUS INSUFFICIENCY: ICD-10-CM

## 2024-07-18 DIAGNOSIS — N39.3 STRESS INCONTINENCE OF URINE: ICD-10-CM

## 2024-07-18 LAB
ALBUMIN SERPL-MCNC: 4.1 G/DL (ref 3.2–4.6)
ALBUMIN/GLOB SERPL: 1.1 (ref 1–1.9)
ALP SERPL-CCNC: 175 U/L (ref 35–104)
ALT SERPL-CCNC: 26 U/L (ref 12–65)
ANION GAP SERPL CALC-SCNC: 11 MMOL/L (ref 9–18)
AST SERPL-CCNC: 25 U/L (ref 15–37)
BASOPHILS # BLD: 0.1 K/UL (ref 0–0.2)
BASOPHILS NFR BLD: 1 % (ref 0–2)
BILIRUB SERPL-MCNC: 0.4 MG/DL (ref 0–1.2)
BUN SERPL-MCNC: 22 MG/DL (ref 8–23)
CALCIUM SERPL-MCNC: 9.8 MG/DL (ref 8.8–10.2)
CHLORIDE SERPL-SCNC: 102 MMOL/L (ref 98–107)
CHOLEST SERPL-MCNC: 258 MG/DL (ref 0–200)
CO2 SERPL-SCNC: 28 MMOL/L (ref 20–28)
CREAT SERPL-MCNC: 0.79 MG/DL (ref 0.6–1.1)
DIFFERENTIAL METHOD BLD: ABNORMAL
EOSINOPHIL # BLD: 0.2 K/UL (ref 0–0.8)
EOSINOPHIL NFR BLD: 3 % (ref 0.5–7.8)
ERYTHROCYTE [DISTWIDTH] IN BLOOD BY AUTOMATED COUNT: 13.9 % (ref 11.9–14.6)
EST. AVERAGE GLUCOSE BLD GHB EST-MCNC: 118 MG/DL
GLOBULIN SER CALC-MCNC: 3.8 G/DL (ref 2.3–3.5)
GLUCOSE SERPL-MCNC: 94 MG/DL (ref 70–99)
HBA1C MFR BLD: 5.7 % (ref 0–5.6)
HCT VFR BLD AUTO: 44.1 % (ref 35.8–46.3)
HDLC SERPL-MCNC: 69 MG/DL (ref 40–60)
HDLC SERPL: 3.8 (ref 0–5)
HGB BLD-MCNC: 13.8 G/DL (ref 11.7–15.4)
IMM GRANULOCYTES # BLD AUTO: 0 K/UL (ref 0–0.5)
IMM GRANULOCYTES NFR BLD AUTO: 0 % (ref 0–5)
LDLC SERPL CALC-MCNC: 170 MG/DL (ref 0–100)
LYMPHOCYTES # BLD: 1.6 K/UL (ref 0.5–4.6)
LYMPHOCYTES NFR BLD: 28 % (ref 13–44)
MCH RBC QN AUTO: 30.1 PG (ref 26.1–32.9)
MCHC RBC AUTO-ENTMCNC: 31.3 G/DL (ref 31.4–35)
MCV RBC AUTO: 96.1 FL (ref 82–102)
MONOCYTES # BLD: 0.5 K/UL (ref 0.1–1.3)
MONOCYTES NFR BLD: 9 % (ref 4–12)
NEUTS SEG # BLD: 3.3 K/UL (ref 1.7–8.2)
NEUTS SEG NFR BLD: 59 % (ref 43–78)
NRBC # BLD: 0 K/UL (ref 0–0.2)
PLATELET # BLD AUTO: 318 K/UL (ref 150–450)
PMV BLD AUTO: 11.2 FL (ref 9.4–12.3)
POTASSIUM SERPL-SCNC: 4.3 MMOL/L (ref 3.5–5.1)
PROT SERPL-MCNC: 8 G/DL (ref 6.3–8.2)
RBC # BLD AUTO: 4.59 M/UL (ref 4.05–5.2)
SODIUM SERPL-SCNC: 141 MMOL/L (ref 136–145)
TRIGL SERPL-MCNC: 99 MG/DL (ref 0–150)
TSH, 3RD GENERATION: 1.74 UIU/ML (ref 0.27–4.2)
VLDLC SERPL CALC-MCNC: 20 MG/DL (ref 6–23)
WBC # BLD AUTO: 5.7 K/UL (ref 4.3–11.1)

## 2024-07-18 RX ORDER — OMEPRAZOLE 20 MG/1
20 CAPSULE, DELAYED RELEASE ORAL
Qty: 90 CAPSULE | Refills: 3 | Status: SHIPPED | OUTPATIENT
Start: 2024-07-18

## 2024-07-18 RX ORDER — FUROSEMIDE 20 MG/1
20 TABLET ORAL DAILY
Qty: 90 TABLET | Refills: 3 | Status: SHIPPED | OUTPATIENT
Start: 2024-07-18

## 2024-07-18 RX ORDER — OXYBUTYNIN CHLORIDE 10 MG/1
10 TABLET, EXTENDED RELEASE ORAL NIGHTLY
Qty: 90 TABLET | Refills: 3 | Status: SHIPPED | OUTPATIENT
Start: 2024-07-18

## 2024-07-18 RX ORDER — BISACODYL 5 MG/1
20 TABLET, DELAYED RELEASE ORAL ONCE
COMMUNITY
Start: 2024-02-14

## 2024-07-18 RX ORDER — LEVOTHYROXINE SODIUM 88 UG/1
88 TABLET ORAL
Qty: 90 TABLET | Refills: 3 | Status: SHIPPED | OUTPATIENT
Start: 2024-07-18

## 2024-07-18 NOTE — PROGRESS NOTES
Vanessa Jolly (: 1950) is a 73 y.o. female, established patient, here for evaluation of the following chief complaint(s):  No chief complaint on file.       ASSESSMENT/PLAN:  1. Chronic allergic rhinitis  -     [facility]  2. Screening for colon cancer  -     POCT FECAL IMMUNOCHEMICAL TEST (FIT); Future  -     CBC with Auto Differential; Future  -     Comprehensive Metabolic Panel; Future  -     Lipid Panel; Future  -     TSH; Future  -     Hemoglobin A1C; Future  3. Situational anxiety  -     CBC with Auto Differential; Future  -     Comprehensive Metabolic Panel; Future  -     Lipid Panel; Future  -     TSH; Future  -     Hemoglobin A1C; Future  4. ASD (atrial septal defect)  -     CBC with Auto Differential; Future  -     Comprehensive Metabolic Panel; Future  -     Lipid Panel; Future  -     TSH; Future  -     Hemoglobin A1C; Future  5. Obesity, Class III, BMI 40-49.9 (morbid obesity) (HCC)  -     CBC with Auto Differential; Future  -     Comprehensive Metabolic Panel; Future  -     Lipid Panel; Future  -     TSH; Future  -     Hemoglobin A1C; Future  6. Major depressive disorder, recurrent, mild (HCC)  -     CBC with Auto Differential; Future  -     Comprehensive Metabolic Panel; Future  -     Lipid Panel; Future  -     TSH; Future  -     Hemoglobin A1C; Future  7. Hypothyroidism, unspecified type  -     CBC with Auto Differential; Future  -     Comprehensive Metabolic Panel; Future  -     Lipid Panel; Future  -     TSH; Future  -     Hemoglobin A1C; Future  8. Abnormal finding of blood chemistry, unspecified  -     Hemoglobin A1C; Future  9. Chronic venous insufficiency  10. Age-related osteoporosis without current pathological fracture  11. Stress incontinence of urine    Chronic rhinitis, with nasal post nasal drainage, has tried zyrtec, benadryl as needed, flonase as needed, will refer to allergy for evaluation.    Hypertension - stable, well controlled, takes

## 2024-07-19 RX ORDER — ROSUVASTATIN CALCIUM 5 MG/1
5 TABLET, COATED ORAL NIGHTLY
Qty: 30 TABLET | Refills: 3 | Status: SHIPPED | OUTPATIENT
Start: 2024-07-19

## 2024-08-06 ENCOUNTER — TELEPHONE (OUTPATIENT)
Dept: FAMILY MEDICINE CLINIC | Facility: CLINIC | Age: 74
End: 2024-08-06

## 2024-08-06 NOTE — TELEPHONE ENCOUNTER
Patient informed that she would need to be tested (home test) and then have a vv with a provider.      Patient has had the sneezing and having nose dripping since 8/5/24.  Having other symptoms.      She ordered tests from [facility] and this will be in tomorrow.  She has a virtual appt with you on 8/8/24 at 8:30 am.      She will test before the visit.

## 2024-08-06 NOTE — TELEPHONE ENCOUNTER
Patient called in stating that her  recently tested positive for covid. Patient states she is now starting exhibit symptoms of covid as well. Patient states she has not been tested or been seen for her symptoms. Patient is asking if the provider was send in some medication for her symptoms. Patient was informed that she may need to be seen at the office or an urgent care.

## 2024-08-08 ENCOUNTER — TELEMEDICINE (OUTPATIENT)
Dept: FAMILY MEDICINE CLINIC | Facility: CLINIC | Age: 74
End: 2024-08-08

## 2024-08-08 DIAGNOSIS — U07.1 COVID-19: Primary | ICD-10-CM

## 2024-08-08 NOTE — PROGRESS NOTES
[place] [address].     On this date 8/8/2024 I have spent 20 minutes reviewing previous notes, test results and face to face (virtual) with the patient discussing the diagnosis and importance of compliance with the treatment plan as well as documenting on the day of the visit.      Part of this note was written by using a voice dictation software. The note has been proof read but may still contain some grammatical/other typographical errors.     --Lu Sandoval, DILLAN, FNP-C

## 2024-08-26 ENCOUNTER — TELEPHONE (OUTPATIENT)
Dept: ORTHOPEDIC SURGERY | Age: 74
End: 2024-08-26

## 2024-08-26 NOTE — TELEPHONE ENCOUNTER
Patient wants to move her surgery from 10/16/24 to mid february to late . I will work on it and let her know

## 2024-08-28 DIAGNOSIS — M17.11 PRIMARY OSTEOARTHRITIS OF RIGHT KNEE: Primary | ICD-10-CM

## 2024-09-19 ENCOUNTER — OFFICE VISIT (OUTPATIENT)
Dept: ORTHOPEDIC SURGERY | Age: 74
End: 2024-09-19

## 2024-09-19 DIAGNOSIS — Z96.652 STATUS POST LEFT KNEE REPLACEMENT: ICD-10-CM

## 2024-09-19 DIAGNOSIS — M17.11 PRIMARY OSTEOARTHRITIS OF RIGHT KNEE: Primary | ICD-10-CM

## 2024-09-19 RX ORDER — HYALURONATE SODIUM 10 MG/ML
20 SYRINGE (ML) INTRAARTICULAR ONCE
Status: COMPLETED | OUTPATIENT
Start: 2024-09-19 | End: 2024-09-19

## 2024-09-19 RX ADMIN — Medication 20 MG: at 11:37

## 2024-09-26 ENCOUNTER — OFFICE VISIT (OUTPATIENT)
Dept: ORTHOPEDIC SURGERY | Age: 74
End: 2024-09-26

## 2024-09-26 DIAGNOSIS — M17.11 PRIMARY OSTEOARTHRITIS OF RIGHT KNEE: Primary | ICD-10-CM

## 2024-09-26 RX ORDER — HYALURONATE SODIUM 10 MG/ML
20 SYRINGE (ML) INTRAARTICULAR ONCE
Status: COMPLETED | OUTPATIENT
Start: 2024-09-26 | End: 2024-09-26

## 2024-09-26 RX ADMIN — Medication 20 MG: at 11:03

## 2024-10-03 ENCOUNTER — OFFICE VISIT (OUTPATIENT)
Dept: ORTHOPEDIC SURGERY | Age: 74
End: 2024-10-03

## 2024-10-03 DIAGNOSIS — M17.11 PRIMARY OSTEOARTHRITIS OF RIGHT KNEE: Primary | ICD-10-CM

## 2024-10-03 RX ORDER — HYALURONATE SODIUM 10 MG/ML
20 SYRINGE (ML) INTRAARTICULAR ONCE
Status: COMPLETED | OUTPATIENT
Start: 2024-10-03 | End: 2024-10-03

## 2024-10-03 RX ADMIN — Medication 20 MG: at 11:16

## 2024-10-03 NOTE — PROGRESS NOTES
Name: Vanessa Jolly  YOB: 1950  Gender: female  MRN: [id number]    Allergies   Allergen Reactions    Latex Rash    Acyclovir Other (See Comments) and Rash     fever    Seasonal     Adhesive Tape Rash       CC:  right knee pain, Osteoarthritis    PROCEDURE: 3 of 3 HA injection(s). All questions answered.     Procedure Note: The patient was placed in upright position with both knees hanging freely from exam table.  The right knee was prepped in sterile fashion using alcohol wipe(s).  Using an infrapatellar approach, 2.5cc of Euflexa was injected freely.  The needle was then removed, pressure hemostatis achieved, injection site was cleansed with alcohol wipe and dressed with band aid.    The patient tolerated the procedure without complication.  The patient  will follow up as scheduled.    10/03/24

## 2024-11-05 DIAGNOSIS — M17.11 PRIMARY OSTEOARTHRITIS OF RIGHT KNEE: Primary | ICD-10-CM

## 2024-12-19 ENCOUNTER — OFFICE VISIT (OUTPATIENT)
Dept: FAMILY MEDICINE CLINIC | Facility: CLINIC | Age: 74
End: 2024-12-19

## 2024-12-19 VITALS
SYSTOLIC BLOOD PRESSURE: 124 MMHG | HEIGHT: 66 IN | DIASTOLIC BLOOD PRESSURE: 94 MMHG | BODY MASS INDEX: 40.5 KG/M2 | WEIGHT: 252 LBS | OXYGEN SATURATION: 97 % | TEMPERATURE: 97.9 F | HEART RATE: 56 BPM

## 2024-12-19 DIAGNOSIS — Z01.818 PRE-OPERATIVE CLEARANCE: Primary | ICD-10-CM

## 2024-12-19 DIAGNOSIS — R73.03 PREDIABETES: ICD-10-CM

## 2024-12-19 DIAGNOSIS — E55.9 VITAMIN D DEFICIENCY: ICD-10-CM

## 2024-12-19 DIAGNOSIS — E03.9 ACQUIRED HYPOTHYROIDISM: ICD-10-CM

## 2024-12-19 PROBLEM — R10.9 CHRONIC ABDOMINAL PAIN: Status: ACTIVE | Noted: 2024-02-14

## 2024-12-19 PROBLEM — G89.29 CHRONIC ABDOMINAL PAIN: Status: ACTIVE | Noted: 2024-02-14

## 2024-12-19 PROBLEM — M25.512 ACUTE PAIN OF LEFT SHOULDER: Status: RESOLVED | Noted: 2022-06-09 | Resolved: 2024-12-19

## 2024-12-19 PROBLEM — K21.9 GASTROESOPHAGEAL REFLUX DISEASE: Status: ACTIVE | Noted: 2024-02-14

## 2024-12-19 PROBLEM — R13.10 DYSPHAGIA: Status: ACTIVE | Noted: 2024-02-14

## 2024-12-19 LAB
25(OH)D3 SERPL-MCNC: 36.2 NG/ML (ref 30–100)
ALBUMIN SERPL-MCNC: 4.1 G/DL (ref 3.2–4.6)
ALBUMIN/GLOB SERPL: 1.3 (ref 1–1.9)
ALP SERPL-CCNC: 123 U/L (ref 35–104)
ALT SERPL-CCNC: 18 U/L (ref 8–45)
ANION GAP SERPL CALC-SCNC: 10 MMOL/L (ref 7–16)
AST SERPL-CCNC: 24 U/L (ref 15–37)
BASOPHILS # BLD: 0.1 K/UL (ref 0–0.2)
BASOPHILS NFR BLD: 1 % (ref 0–2)
BILIRUB SERPL-MCNC: 0.5 MG/DL (ref 0–1.2)
BUN SERPL-MCNC: 26 MG/DL (ref 8–23)
CALCIUM SERPL-MCNC: 9.8 MG/DL (ref 8.8–10.2)
CHLORIDE SERPL-SCNC: 102 MMOL/L (ref 98–107)
CO2 SERPL-SCNC: 29 MMOL/L (ref 20–29)
CREAT SERPL-MCNC: 0.74 MG/DL (ref 0.6–1.1)
DIFFERENTIAL METHOD BLD: NORMAL
EOSINOPHIL # BLD: 0.2 K/UL (ref 0–0.8)
EOSINOPHIL NFR BLD: 3 % (ref 0.5–7.8)
ERYTHROCYTE [DISTWIDTH] IN BLOOD BY AUTOMATED COUNT: 13.5 % (ref 11.9–14.6)
EST. AVERAGE GLUCOSE BLD GHB EST-MCNC: 117 MG/DL
GLOBULIN SER CALC-MCNC: 3.2 G/DL (ref 2.3–3.5)
GLUCOSE SERPL-MCNC: 87 MG/DL (ref 70–99)
HBA1C MFR BLD: 5.7 % (ref 0–5.6)
HCT VFR BLD AUTO: 40 % (ref 35.8–46.3)
HGB BLD-MCNC: 12.7 G/DL (ref 11.7–15.4)
IMM GRANULOCYTES # BLD AUTO: 0 K/UL (ref 0–0.5)
IMM GRANULOCYTES NFR BLD AUTO: 0 % (ref 0–5)
LYMPHOCYTES # BLD: 1.7 K/UL (ref 0.5–4.6)
LYMPHOCYTES NFR BLD: 32 % (ref 13–44)
MCH RBC QN AUTO: 30.2 PG (ref 26.1–32.9)
MCHC RBC AUTO-ENTMCNC: 31.8 G/DL (ref 31.4–35)
MCV RBC AUTO: 95 FL (ref 82–102)
MONOCYTES # BLD: 0.5 K/UL (ref 0.1–1.3)
MONOCYTES NFR BLD: 9 % (ref 4–12)
NEUTS SEG # BLD: 3 K/UL (ref 1.7–8.2)
NEUTS SEG NFR BLD: 55 % (ref 43–78)
NRBC # BLD: 0 K/UL (ref 0–0.2)
PLATELET # BLD AUTO: 271 K/UL (ref 150–450)
PMV BLD AUTO: 11.2 FL (ref 9.4–12.3)
POTASSIUM SERPL-SCNC: 4.2 MMOL/L (ref 3.5–5.1)
PROT SERPL-MCNC: 7.3 G/DL (ref 6.3–8.2)
RBC # BLD AUTO: 4.21 M/UL (ref 4.05–5.2)
SODIUM SERPL-SCNC: 141 MMOL/L (ref 136–145)
T4 FREE SERPL-MCNC: 1 NG/DL (ref 0.9–1.7)
TSH W FREE THYROID IF ABNORMAL: 7.07 UIU/ML (ref 0.27–4.2)
WBC # BLD AUTO: 5.4 K/UL (ref 4.3–11.1)

## 2024-12-19 SDOH — ECONOMIC STABILITY: FOOD INSECURITY: WITHIN THE PAST 12 MONTHS, THE FOOD YOU BOUGHT JUST DIDN'T LAST AND YOU DIDN'T HAVE MONEY TO GET MORE.: NEVER TRUE

## 2024-12-19 SDOH — ECONOMIC STABILITY: FOOD INSECURITY: WITHIN THE PAST 12 MONTHS, YOU WORRIED THAT YOUR FOOD WOULD RUN OUT BEFORE YOU GOT MONEY TO BUY MORE.: NEVER TRUE

## 2024-12-19 SDOH — ECONOMIC STABILITY: INCOME INSECURITY: HOW HARD IS IT FOR YOU TO PAY FOR THE VERY BASICS LIKE FOOD, HOUSING, MEDICAL CARE, AND HEATING?: NOT HARD AT ALL

## 2024-12-19 NOTE — PROGRESS NOTES
Vanessa Jolly (: 1950) is a 74 y.o. female, established patient, here for evaluation of the following chief complaint(s):  Pre-op Exam (Would also like thyroid. )       ASSESSMENT/PLAN:  1. Pre-operative clearance  -     EKG 12 Lead; Future  -     Comprehensive Metabolic Panel; Future  -     CBC with Auto Differential; Future  2. Acquired hypothyroidism  -     TSH reflex to FT4; Future  3. Prediabetes  -     Hemoglobin A1C; Future  4. Vitamin D deficiency  -     Vitamin D 25 Hydroxy; Future    She is cleared for her procedure pending lab work. EKG unremarkable today. PE unremarkable.       SUBJECTIVE/OBJECTIVE:  HPI    Medical clearance:     She is here to receive medical clearance today for liposuction on . PMH reviewed. ECHO completed in  with EF 53%. Previous EKG in  with NSR. Will repeat this today. Also including CBC, CMP.     She saw Cardiology in 2024- noted to have hx of ASD. Per note, the right sided chamber size was noted to be normal, no indication for closure.       Overall she is doing well today. She has gained appx 12 lbs since her last visit. She is unsure why she is gaining weight although she has been under a lot of stress. We will repeat a TSH and A1C today as she was pre-diabetic at her last visit.     Vitals:    24 0829 24 0901   BP: (!) 136/98 (!) 124/94   Pulse: 56    Temp: 97.9 °F (36.6 °C)    SpO2: 97%    Weight: 114.3 kg (252 lb)    Height: 1.676 m (5' 6\")       EKG: sinus bradycardia, HR 53.      Physical Exam  Constitutional:       Appearance: She is obese.   Cardiovascular:      Rate and Rhythm: Regular rhythm. Bradycardia present.      Heart sounds: Normal heart sounds.   Pulmonary:      Effort: Pulmonary effort is normal.      Breath sounds: Normal breath sounds.   Skin:     General: Skin is warm and dry.   Neurological:      Mental Status: She is alert and oriented to person, place, and time.       On this date 24  I have spent 30

## 2024-12-20 DIAGNOSIS — E03.9 ACQUIRED HYPOTHYROIDISM: Primary | ICD-10-CM

## 2024-12-20 RX ORDER — LEVOTHYROXINE SODIUM 100 UG/1
100 TABLET ORAL
Qty: 30 TABLET | Refills: 1 | Status: SHIPPED | OUTPATIENT
Start: 2024-12-20

## 2024-12-20 NOTE — RESULT ENCOUNTER NOTE
Please call and let patient know her thyroid level was elevated. This could potentially be the cause for her weight gain. T4 was normal. We can increase to 100 mcg of Levothyroxine and need to repeat labs in 6 weeks. Please see if she would like to proceed with the increase.     Overall, other labs were stable. We will fax her labwork to the surgical facility.

## 2025-01-02 ENCOUNTER — COMMUNITY OUTREACH (OUTPATIENT)
Dept: FAMILY MEDICINE CLINIC | Facility: CLINIC | Age: 75
End: 2025-01-02

## 2025-01-02 NOTE — PROGRESS NOTES
Patient's HM shows they are overdue for Colorectal Screening.   Care Everywhere and  files searched.   updated with 5/20/24 colonoscopy.

## 2025-01-14 ENCOUNTER — PREP FOR PROCEDURE (OUTPATIENT)
Dept: ORTHOPEDIC SURGERY | Age: 75
End: 2025-01-14

## 2025-01-14 DIAGNOSIS — M17.11 PRIMARY OSTEOARTHRITIS OF RIGHT KNEE: Primary | ICD-10-CM

## 2025-01-14 RX ORDER — SODIUM CHLORIDE 0.9 % (FLUSH) 0.9 %
5-40 SYRINGE (ML) INJECTION PRN
Status: CANCELLED | OUTPATIENT
Start: 2025-01-14

## 2025-01-14 RX ORDER — SODIUM CHLORIDE 0.9 % (FLUSH) 0.9 %
5-40 SYRINGE (ML) INJECTION EVERY 12 HOURS SCHEDULED
Status: CANCELLED | OUTPATIENT
Start: 2025-01-14

## 2025-01-14 RX ORDER — SODIUM CHLORIDE 9 MG/ML
INJECTION, SOLUTION INTRAVENOUS PRN
Status: CANCELLED | OUTPATIENT
Start: 2025-01-14

## 2025-01-14 RX ORDER — ACETAMINOPHEN 325 MG/1
1000 TABLET ORAL ONCE
Status: CANCELLED | OUTPATIENT
Start: 2025-01-14 | End: 2025-01-14

## 2025-01-21 ENCOUNTER — HOSPITAL ENCOUNTER (OUTPATIENT)
Dept: REHABILITATION | Age: 75
Discharge: HOME | End: 2025-01-24
Payer: MEDICARE

## 2025-01-21 ENCOUNTER — HOSPITAL ENCOUNTER (OUTPATIENT)
Dept: SURGERY | Age: 75
Discharge: HOME | End: 2025-01-24
Payer: MEDICARE

## 2025-01-21 ENCOUNTER — ANESTHESIA EVENT (OUTPATIENT)
Dept: SURGERY | Age: 75
End: 2025-01-21
Payer: MEDICARE

## 2025-01-21 VITALS
BODY MASS INDEX: 39.76 KG/M2 | DIASTOLIC BLOOD PRESSURE: 74 MMHG | WEIGHT: 247.4 LBS | TEMPERATURE: 97.7 F | SYSTOLIC BLOOD PRESSURE: 130 MMHG | HEART RATE: 69 BPM | OXYGEN SATURATION: 99 % | RESPIRATION RATE: 18 BRPM | HEIGHT: 66 IN

## 2025-01-21 DIAGNOSIS — M17.11 PRIMARY OSTEOARTHRITIS OF RIGHT KNEE: ICD-10-CM

## 2025-01-21 LAB
ALBUMIN SERPL-MCNC: 3.7 G/DL (ref 3.2–4.6)
ALBUMIN/GLOB SERPL: 0.9 (ref 1–1.9)
ALP SERPL-CCNC: 166 U/L (ref 35–104)
ALT SERPL-CCNC: 31 U/L (ref 8–45)
ANION GAP SERPL CALC-SCNC: 10 MMOL/L (ref 7–16)
AST SERPL-CCNC: 34 U/L (ref 15–37)
BASOPHILS # BLD: 0.03 K/UL (ref 0–0.2)
BASOPHILS NFR BLD: 0.5 % (ref 0–2)
BILIRUB SERPL-MCNC: 0.3 MG/DL (ref 0–1.2)
BUN SERPL-MCNC: 27 MG/DL (ref 8–23)
CALCIUM SERPL-MCNC: 9.4 MG/DL (ref 8.8–10.2)
CHLORIDE SERPL-SCNC: 105 MMOL/L (ref 98–107)
CO2 SERPL-SCNC: 26 MMOL/L (ref 20–29)
CREAT SERPL-MCNC: 0.67 MG/DL (ref 0.6–1.1)
DIFFERENTIAL METHOD BLD: ABNORMAL
EOSINOPHIL # BLD: 0.1 K/UL (ref 0–0.8)
EOSINOPHIL NFR BLD: 1.6 % (ref 0.5–7.8)
ERYTHROCYTE [DISTWIDTH] IN BLOOD BY AUTOMATED COUNT: 14.2 % (ref 11.9–14.6)
EST. AVERAGE GLUCOSE BLD GHB EST-MCNC: 104 MG/DL
GLOBULIN SER CALC-MCNC: 3.9 G/DL (ref 2.3–3.5)
GLUCOSE SERPL-MCNC: 78 MG/DL (ref 70–99)
HBA1C MFR BLD: 5.2 % (ref 0–5.6)
HCT VFR BLD AUTO: 37.8 % (ref 35.8–46.3)
HGB BLD-MCNC: 11.9 G/DL (ref 11.7–15.4)
IMM GRANULOCYTES # BLD AUTO: 0.02 K/UL (ref 0–0.5)
IMM GRANULOCYTES NFR BLD AUTO: 0.3 % (ref 0–5)
INR PPP: 1.1
LYMPHOCYTES # BLD: 1.36 K/UL (ref 0.5–4.6)
LYMPHOCYTES NFR BLD: 21.3 % (ref 13–44)
MCH RBC QN AUTO: 30.4 PG (ref 26.1–32.9)
MCHC RBC AUTO-ENTMCNC: 31.5 G/DL (ref 31.4–35)
MCV RBC AUTO: 96.4 FL (ref 82–102)
MONOCYTES # BLD: 0.5 K/UL (ref 0.1–1.3)
MONOCYTES NFR BLD: 7.8 % (ref 4–12)
MRSA DNA SPEC QL NAA+PROBE: DETECTED
NEUTS SEG # BLD: 4.39 K/UL (ref 1.7–8.2)
NEUTS SEG NFR BLD: 68.5 % (ref 43–78)
NRBC # BLD: 0 K/UL (ref 0–0.2)
PLATELET # BLD AUTO: 395 K/UL (ref 150–450)
PMV BLD AUTO: 10.3 FL (ref 9.4–12.3)
POTASSIUM SERPL-SCNC: 3.8 MMOL/L (ref 3.5–5.1)
PROT SERPL-MCNC: 7.6 G/DL (ref 6.3–8.2)
PROTHROMBIN TIME: 14 SEC (ref 11.3–14.9)
RBC # BLD AUTO: 3.92 M/UL (ref 4.05–5.2)
S AUREUS CPE NOSE QL NAA+PROBE: DETECTED
SODIUM SERPL-SCNC: 141 MMOL/L (ref 136–145)
WBC # BLD AUTO: 6.4 K/UL (ref 4.3–11.1)

## 2025-01-21 PROCEDURE — 98960 EDU&TRN PT SELF-MGMT NQHP 1: CPT

## 2025-01-21 PROCEDURE — 97161 PT EVAL LOW COMPLEX 20 MIN: CPT

## 2025-01-21 PROCEDURE — 83036 HEMOGLOBIN GLYCOSYLATED A1C: CPT

## 2025-01-21 PROCEDURE — 85025 COMPLETE CBC W/AUTO DIFF WBC: CPT

## 2025-01-21 PROCEDURE — 87641 MR-STAPH DNA AMP PROBE: CPT

## 2025-01-21 PROCEDURE — 94760 N-INVAS EAR/PLS OXIMETRY 1: CPT

## 2025-01-21 PROCEDURE — 85610 PROTHROMBIN TIME: CPT

## 2025-01-21 PROCEDURE — 80053 COMPREHEN METABOLIC PANEL: CPT

## 2025-01-21 RX ORDER — LEVOCETIRIZINE DIHYDROCHLORIDE 5 MG/1
5 TABLET, FILM COATED ORAL NIGHTLY
COMMUNITY
Start: 2025-01-18

## 2025-01-21 RX ORDER — TRIAMCINOLONE ACETONIDE 55 UG/1
1 SPRAY, METERED NASAL 2 TIMES DAILY
COMMUNITY

## 2025-01-21 RX ORDER — LANOLIN ALCOHOL/MO/W.PET/CERES
1000 CREAM (GRAM) TOPICAL DAILY
COMMUNITY

## 2025-01-21 RX ORDER — B1/B2/B3/B5/B6/IRON/METH/CHOLN 2.5-18/15
1 LIQUID (ML) ORAL DAILY
COMMUNITY

## 2025-01-21 RX ORDER — MONTELUKAST SODIUM 10 MG/1
10 TABLET ORAL NIGHTLY
COMMUNITY

## 2025-01-21 ASSESSMENT — KOOS JR
KOOS JR TOTAL INTERVAL SCORE: 76.332
STANDING UPRIGHT: MILD
STRAIGHTENING KNEE FULLY: MILD
RISING FROM SITTING: MILD
GOING UP OR DOWN STAIRS: MILD

## 2025-01-21 ASSESSMENT — PROMIS GLOBAL HEALTH SCALE
SUM OF RESPONSES TO QUESTIONS 2, 4, 5, & 10: 16
IN GENERAL, WOULD YOU SAY YOUR HEALTH IS...[ON A SCALE OF 1 (POOR) TO 5 (EXCELLENT)]: VERY GOOD
SUM OF RESPONSES TO QUESTIONS 3, 6, 7, & 8: 16
TO WHAT EXTENT ARE YOU ABLE TO CARRY OUT YOUR EVERYDAY PHYSICAL ACTIVITIES SUCH AS WALKING, CLIMBING STAIRS, CARRYING GROCERIES, OR MOVING A CHAIR [ON A SCALE OF 1 (NOT AT ALL) TO 5 (COMPLETELY)]?: COMPLETELY
IN GENERAL, PLEASE RATE HOW WELL YOU CARRY OUT YOUR USUAL SOCIAL ACTIVITIES (INCLUDES ACTIVITIES AT HOME, AT WORK, AND IN YOUR COMMUNITY, AND RESPONSIBILITIES AS A PARENT, CHILD, SPOUSE, EMPLOYEE, FRIEND, ETC) [ON A SCALE OF 1 (POOR) TO 5 (EXCELLENT)]?: VERY GOOD
HOW IS THE PROMIS V1.1 BEING ADMINISTERED?: PAPER
IN THE PAST 7 DAYS, HOW WOULD YOU RATE YOUR FATIGUE ON AVERAGE [ON A SCALE FROM 1 (NONE) TO 5 (VERY SEVERE)]?: MILD
IN GENERAL, HOW WOULD YOU RATE YOUR PHYSICAL HEALTH [ON A SCALE OF 1 (POOR) TO 5 (EXCELLENT)]?: GOOD
WHO IS THE PERSON COMPLETING THE PROMIS V1.1 SURVEY?: SELF
IN GENERAL, WOULD YOU SAY YOUR QUALITY OF LIFE IS...[ON A SCALE OF 1 (POOR) TO 5 (EXCELLENT)]: VERY GOOD
IN GENERAL, HOW WOULD YOU RATE YOUR MENTAL HEALTH, INCLUDING YOUR MOOD AND YOUR ABILITY TO THINK [ON A SCALE OF 1 (POOR) TO 5 (EXCELLENT)]?: VERY GOOD
IN GENERAL, HOW WOULD YOU RATE YOUR SATISFACTION WITH YOUR SOCIAL ACTIVITIES AND RELATIONSHIPS [ON A SCALE OF 1 (POOR) TO 5 (EXCELLENT)]?: VERY GOOD
IN THE PAST 7 DAYS, HOW WOULD YOU RATE YOUR PAIN ON AVERAGE [ON A SCALE FROM 0 (NO PAIN) TO 10 (WORST IMAGINABLE PAIN)]?: 4
IN THE PAST 7 DAYS, HOW OFTEN HAVE YOU BEEN BOTHERED BY EMOTIONAL PROBLEMS, SUCH AS FEELING ANXIOUS, DEPRESSED, OR IRRITABLE [ON A SCALE FROM 1 (NEVER) TO 5 (ALWAYS)]?: RARELY

## 2025-01-21 ASSESSMENT — PAIN SCALES - GENERAL: PAINLEVEL_OUTOF10: 2

## 2025-01-21 ASSESSMENT — PULMONARY FUNCTION TESTS: FEV1 (LITERS): 2.16

## 2025-01-21 ASSESSMENT — PAIN DESCRIPTION - LOCATION
LOCATION: KNEE
LOCATION: KNEE

## 2025-01-21 ASSESSMENT — PAIN DESCRIPTION - ORIENTATION
ORIENTATION: RIGHT
ORIENTATION: RIGHT

## 2025-01-21 ASSESSMENT — PAIN DESCRIPTION - DESCRIPTORS: DESCRIPTORS: ACHING;SHARP;STABBING

## 2025-01-21 NOTE — PERIOP NOTE
Patient verified name and .    Order for consent was found in EHR and does match case posting; patient verified.     Type 3 surgery, joint-camp assessment complete.    Labs per surgeon: CBC, CMP, A1C, PT/INR ; results pending.   Labs per anesthesia protocol: no additional labs needed.   EKG: Found under Cardiology tab in EHR dated 24. Anesthesia to review.     Echo (11/15/23), Stress test (10/31/23), EKG (23), PCP note (24), and [facility] Cardiology note (24) found in EHR for reference.     MRSA/MSSA swab collected per policy. MD to consult pharmacy to dose Vanc if appropriate.     Hospital approved surgical skin cleanser and instructions to return bottle on DOS given per hospital policy.    Patient provided with handouts including Guide to Surgery, Pain Management, Preventing Surgical Site Infections, and [place] [facility] Brochure.    Patient answered medical/surgical history questions at their best of ability. All prior to admission medications documented in Epic. NOT ALL Original medication prescription bottles visualized during patient appointment.     Patient instructed to hold all vitamins 3 weeks prior to surgery and NSAIDS 5 days prior to surgery.     Patient teach back successful and patient demonstrates knowledge of instruction.

## 2025-01-21 NOTE — PERIOP NOTE
PLEASE CONTINUE TAKING ALL PRESCRIPTION MEDICATIONS UP TO THE DAY OF SURGERY UNLESS OTHERWISE DIRECTED BELOW. You may take Tylenol, allergy, and/or indigestion medications.     TAKE ONLY THESE MEDICATIONS ON THE DAY OF SURGERY ON 02/12/25      Eye drops, Hydroxyzine if needed, Levothyroxine, Omeprazole, Nasacort             DISCONTINUE all vitamins, herbals, and supplements 3 weeks prior to surgery. DISCONTINUE Non-Steroidal Anti-Inflammatory (NSAIDS) such as Advil, Ibuprofen, Motrin, Naproxen, and Aleve 5 days prior to surgery unless instructed to hold longer by surgeon.     Home Medications to Hold- please continue all other medications except these.            Comments      On the day before surgery (02/11/25) please take 2 Tylenol in the morning and then again before bed. You may use either regular or extra strength.      Bring: Photo ID, Insurance card, Miya-hex soap, Incentive Spirometer        Please do not bring home medications with you on the day of surgery unless otherwise directed by your nurse.  If you are instructed to bring home medications, please give them to your nurse as they will be administered by the nursing staff.    If you have any questions, please call [facility] [phone].    A copy of this note was provided to the patient for reference.

## 2025-01-21 NOTE — PROGRESS NOTES
01/21/25 1115   Treatment   Treatment Type Bedside spirometry   Breath Sounds   Breath Sounds Bilateral Clear   Oxygen Therapy/Pulse Ox   O2 Therapy Room air   Pulse 69   SpO2 99 %   Pulse Oximeter Device Mode Intermittent   $Pulse Oximeter $Spot check (single)   Bedside Spirometry   FEV-1/Actual (Liters) 2.16 Liters   FEV-1/Predicted (Liters) 98 Liters     Initial respiratory Assessment completed with pt. Pt was interviewed and evaluated in [facility] prior to surgery.  Patient ID:  Vanessa Jolly  [id number]  74 y.o.  1950  Surgeon: Dr. Law  Date of Surgery: [unfilled]2/12/2025  Procedure: Total Right Knee Arthroplasty  Primary Care Physician: Shakira Kirkpatrick [phone]  Specialists:    Pt taught proper COUGH technique  IS REVIEWED WITH PT AS WELL AS BENEFITS OF USING IS IN SEDENTARY PTS.  DIAPHRAGMATIC BREATHING EXERCISE INSTRUCTIONS GIVEN    History of smoking:   DENIES                 Quit date:         Secondhand smoke:FATHER    Past procedures with Oxygen desaturation or delayed awakening:DENIES     Respiratory history:DENIES SOB                                                                Respiratory meds:  DENIES    FAMILY PRESENT:             NO     PAST SLEEP STUDY:                        DENIES  HX OF YONG:                                        DENIES  YONG assessment:     DANGERS OF UNTREATED YONG EXPLAINED TO PT.                                          SLEEPS ON SIDE       &      BACK          PHYSICAL EXAM   Body mass index is 39.93 kg/m².   Vitals:    01/21/25 1115   BP:    Pulse: (P) 69   Resp:    Temp:    SpO2: (P) 99%     Neck circumference:  33    cm    Loud snoring:                                                 YES            Witnessed apnea or wakening gasping or choking:        DENIES      Awakens with headaches:                                               DENIES  Morning or daytime tiredness/ sleepiness:                           TIRED  Dry mouth or sore

## 2025-01-21 NOTE — PROGRESS NOTES
Vanessa Jolly  : 1950  Primary: Medicare Part A And B  Secondary: [facility] at [place] [address]  [phone]      Physical Therapy Prehab Evaluation Summary:2025   Time In/Out   PT Charge Capture  Episode     MEDICAL/REFERRING DIAGNOSIS: Unilateral primary osteoarthritis, right knee [M17.11]  REFERRING PHYSICIAN: Otoniel Law Jr., *    Treatment Diagnosis:   Pain in Right Knee (M25.561)  Stiffness of Right Knee, Not elsewhere classified (M25.661)  Difficulty in walking, Not elsewhere classified (R26.2)  Other abnormalities of gait and mobility (R26.89)    DATE OF SURGERY: 25  Assessment:   COMMENTS:  Ms. Jolly is present for a Prehab Physical Therapy Assessment for their upcoming right TKA. They are here alone. After discussing the surgical admission options and discharge plans, they are planning on discharging after one night in the hospital.    Pt plans to have assistance from daughter following surgery and hospital stay. Pt had left tka two years ago in December.    PROBLEM LIST:   (Impacting functional limitations):  Ms. Jolly presents with the following lower extremity(s) problems:  Transfers  Gait  Strength  Range of Motion  Balance  Home Exercise Program  Pain INTERVENTIONS PLANNED:   (Benefits and precautions of physical therapy have been discussed with the patient.)  Home Exercise Program  Educational Discussion       GOALS: (Goals have been discussed and agreed upon with patient.)  Discharge Goals: Time Frame: 1 Day  Patient will demonstrate independence with a home exercise program designed to increase strength, range of motion, balance, coordination, functional technique, and pain control to minimize functional deficits and optimize patient for total joint replacement.    Subjective:   Past Medical History/Comorbidities:   Ms. Jolly  has a past medical history of Age related osteoporosis, Allergic

## 2025-01-30 ENCOUNTER — OFFICE VISIT (OUTPATIENT)
Dept: FAMILY MEDICINE CLINIC | Facility: CLINIC | Age: 75
End: 2025-01-30

## 2025-01-30 VITALS
OXYGEN SATURATION: 99 % | SYSTOLIC BLOOD PRESSURE: 134 MMHG | BODY MASS INDEX: 40.02 KG/M2 | WEIGHT: 249 LBS | HEIGHT: 66 IN | TEMPERATURE: 97.6 F | DIASTOLIC BLOOD PRESSURE: 82 MMHG | HEART RATE: 62 BPM

## 2025-01-30 DIAGNOSIS — L98.9 FACIAL LESION: ICD-10-CM

## 2025-01-30 DIAGNOSIS — E03.9 ACQUIRED HYPOTHYROIDISM: ICD-10-CM

## 2025-01-30 DIAGNOSIS — Z22.322 POSITIVE NASAL CULTURE FOR METHICILLIN RESISTANT STAPHYLOCOCCUS AUREUS: ICD-10-CM

## 2025-01-30 DIAGNOSIS — Z00.00 MEDICARE ANNUAL WELLNESS VISIT, SUBSEQUENT: Primary | ICD-10-CM

## 2025-01-30 DIAGNOSIS — Z71.89 ACP (ADVANCE CARE PLANNING): ICD-10-CM

## 2025-01-30 PROBLEM — R07.89 ATYPICAL CHEST PAIN: Status: RESOLVED | Noted: 2023-10-16 | Resolved: 2025-01-30

## 2025-01-30 PROBLEM — R69 ILL-DEFINED CONDITION: Status: RESOLVED | Noted: 2023-10-16 | Resolved: 2025-01-30

## 2025-01-30 LAB — TSH W FREE THYROID IF ABNORMAL: 1.18 UIU/ML (ref 0.27–4.2)

## 2025-01-30 RX ORDER — MUPIROCIN 20 MG/G
OINTMENT TOPICAL
Qty: 15 G | Refills: 1 | Status: SHIPPED | OUTPATIENT
Start: 2025-01-30

## 2025-01-30 SDOH — ECONOMIC STABILITY: INCOME INSECURITY: IN THE LAST 12 MONTHS, WAS THERE A TIME WHEN YOU WERE NOT ABLE TO PAY THE MORTGAGE OR RENT ON TIME?: NO

## 2025-01-30 SDOH — ECONOMIC STABILITY: TRANSPORTATION INSECURITY
IN THE PAST 12 MONTHS, HAS THE LACK OF TRANSPORTATION KEPT YOU FROM MEDICAL APPOINTMENTS OR FROM GETTING MEDICATIONS?: NO

## 2025-01-30 SDOH — ECONOMIC STABILITY: FOOD INSECURITY: WITHIN THE PAST 12 MONTHS, YOU WORRIED THAT YOUR FOOD WOULD RUN OUT BEFORE YOU GOT MONEY TO BUY MORE.: NEVER TRUE

## 2025-01-30 SDOH — ECONOMIC STABILITY: FOOD INSECURITY: WITHIN THE PAST 12 MONTHS, THE FOOD YOU BOUGHT JUST DIDN'T LAST AND YOU DIDN'T HAVE MONEY TO GET MORE.: NEVER TRUE

## 2025-01-30 SDOH — HEALTH STABILITY: PHYSICAL HEALTH: ON AVERAGE, HOW MANY DAYS PER WEEK DO YOU ENGAGE IN MODERATE TO STRENUOUS EXERCISE (LIKE A BRISK WALK)?: 2 DAYS

## 2025-01-30 ASSESSMENT — PATIENT HEALTH QUESTIONNAIRE - PHQ9
7. TROUBLE CONCENTRATING ON THINGS, SUCH AS READING THE NEWSPAPER OR WATCHING TELEVISION: NOT AT ALL
SUM OF ALL RESPONSES TO PHQ QUESTIONS 1-9: 0
SUM OF ALL RESPONSES TO PHQ9 QUESTIONS 1 & 2: 0
2. FEELING DOWN, DEPRESSED OR HOPELESS: NOT AT ALL
SUM OF ALL RESPONSES TO PHQ QUESTIONS 1-9: 0
SUM OF ALL RESPONSES TO PHQ9 QUESTIONS 1 & 2: 0
9. THOUGHTS THAT YOU WOULD BE BETTER OFF DEAD, OR OF HURTING YOURSELF: NOT AT ALL
SUM OF ALL RESPONSES TO PHQ QUESTIONS 1-9: 1
8. MOVING OR SPEAKING SO SLOWLY THAT OTHER PEOPLE COULD HAVE NOTICED. OR THE OPPOSITE, BEING SO FIGETY OR RESTLESS THAT YOU HAVE BEEN MOVING AROUND A LOT MORE THAN USUAL: NOT AT ALL
1. LITTLE INTEREST OR PLEASURE IN DOING THINGS: NOT AT ALL
3. TROUBLE FALLING OR STAYING ASLEEP: NOT AT ALL
4. FEELING TIRED OR HAVING LITTLE ENERGY: SEVERAL DAYS
1. LITTLE INTEREST OR PLEASURE IN DOING THINGS: NOT AT ALL
SUM OF ALL RESPONSES TO PHQ QUESTIONS 1-9: 1
SUM OF ALL RESPONSES TO PHQ QUESTIONS 1-9: 1
2. FEELING DOWN, DEPRESSED OR HOPELESS: NOT AT ALL
5. POOR APPETITE OR OVEREATING: NOT AT ALL
SUM OF ALL RESPONSES TO PHQ QUESTIONS 1-9: 1
SUM OF ALL RESPONSES TO PHQ QUESTIONS 1-9: 0
SUM OF ALL RESPONSES TO PHQ QUESTIONS 1-9: 0
10. IF YOU CHECKED OFF ANY PROBLEMS, HOW DIFFICULT HAVE THESE PROBLEMS MADE IT FOR YOU TO DO YOUR WORK, TAKE CARE OF THINGS AT HOME, OR GET ALONG WITH OTHER PEOPLE: NOT DIFFICULT AT ALL
6. FEELING BAD ABOUT YOURSELF - OR THAT YOU ARE A FAILURE OR HAVE LET YOURSELF OR YOUR FAMILY DOWN: NOT AT ALL

## 2025-01-30 ASSESSMENT — LIFESTYLE VARIABLES
HOW OFTEN DO YOU HAVE A DRINK CONTAINING ALCOHOL: MONTHLY OR LESS
HOW OFTEN DO YOU HAVE SIX OR MORE DRINKS ON ONE OCCASION: 1
HOW OFTEN DO YOU HAVE A DRINK CONTAINING ALCOHOL: MONTHLY OR LESS
HOW MANY STANDARD DRINKS CONTAINING ALCOHOL DO YOU HAVE ON A TYPICAL DAY: 1 OR 2
HOW MANY STANDARD DRINKS CONTAINING ALCOHOL DO YOU HAVE ON A TYPICAL DAY: 1
HOW OFTEN DO YOU HAVE A DRINK CONTAINING ALCOHOL: 2
HOW MANY STANDARD DRINKS CONTAINING ALCOHOL DO YOU HAVE ON A TYPICAL DAY: 1 OR 2

## 2025-01-30 NOTE — PROGRESS NOTES
Vanessa Jolly (: 1950) is a 74 y.o. female, established patient, here for evaluation of the following chief complaint(s):  Other (Here for thyroid/Went to [facility] & was positive for MRSA with the nose swab//)       ASSESSMENT/PLAN:  1. Medicare annual wellness visit, subsequent  2. ACP (advance care planning)  -     ADVANCE CARE PLANNING FIRST 30 MINS  3. Facial lesion  -     AFL - Jian Andino MD, PA  4. Acquired hypothyroidism  -     TSH reflex to FT4; Future  5. Positive nasal culture for methicillin resistant Staphylococcus aureus  -     mupirocin (BACTROBAN) 2 % ointment; Apply topically 2 times daily for 5 days., Disp-15 g, R-1, Normal      Return in about 6 months (around 2025) for chronic F/U .    Medicare wellness visit completed  Refill chronic medications  Check labs today  See dentist every 6 months  See eye physician or get eyes checked every 1-2 years  Immunizations reviewed and discussed with patient    SUBJECTIVE/OBJECTIVE:  HPI    Here for medicare wellness.     She was last seen on 24 for preop clearance for liposuction. TSH noted to be above 7.0, T4 normal. She had been experiencing unintentional weight gain. Advised increase of levothyroxine to 100 mcg daily.     At today's visit:     She has a right total knee arthroscopy scheduled on 25.     Overall doing well, her liposuction procedure went well.     Repeating her TSH today. She is not aware of any med refills she needs.     She was positive for MRSA on a nasal swab. Will treat with Bactroban ointment BID x5 days.     She has a lesion to her anterior nose. It is circular and macular in appearance, not concerning for potential cancerous lesion. However advised we refer to Derm for head to toe assessment.     Vitals:    25 1354   BP: 134/82   Pulse: 62   Temp: 97.6 °F (36.4 °C)   SpO2: 99%         Physical Exam  Cardiovascular:      Rate and Rhythm: Normal rate and regular rhythm.      Heart sounds:

## 2025-01-31 RX ORDER — ROSUVASTATIN CALCIUM 5 MG/1
5 TABLET, COATED ORAL NIGHTLY
Qty: 90 TABLET | Refills: 3 | Status: SHIPPED | OUTPATIENT
Start: 2025-01-31

## 2025-02-06 ENCOUNTER — OFFICE VISIT (OUTPATIENT)
Dept: ORTHOPEDIC SURGERY | Age: 75
End: 2025-02-06

## 2025-02-06 DIAGNOSIS — M17.11 PRIMARY OSTEOARTHRITIS OF RIGHT KNEE: Primary | ICD-10-CM

## 2025-02-06 PROCEDURE — 3017F COLORECTAL CA SCREEN DOC REV: CPT | Performed by: ORTHOPAEDIC SURGERY

## 2025-02-06 PROCEDURE — 1036F TOBACCO NON-USER: CPT | Performed by: ORTHOPAEDIC SURGERY

## 2025-02-06 RX ORDER — ASPIRIN 81 MG/1
81 TABLET ORAL 2 TIMES DAILY
Qty: 70 TABLET | Refills: 0 | Status: SHIPPED | OUTPATIENT
Start: 2025-02-10 | End: 2025-03-17

## 2025-02-06 RX ORDER — PROMETHAZINE HYDROCHLORIDE 12.5 MG/1
12.5 TABLET ORAL 4 TIMES DAILY PRN
Qty: 20 TABLET | Refills: 2 | Status: SHIPPED | OUTPATIENT
Start: 2025-02-10

## 2025-02-06 RX ORDER — CELECOXIB 200 MG/1
200 CAPSULE ORAL 2 TIMES DAILY
Qty: 60 CAPSULE | Refills: 0 | Status: SHIPPED | OUTPATIENT
Start: 2025-02-10 | End: 2025-03-12

## 2025-02-06 RX ORDER — TIZANIDINE 2 MG/1
2 TABLET ORAL 3 TIMES DAILY PRN
Qty: 30 TABLET | Refills: 0 | Status: SHIPPED | OUTPATIENT
Start: 2025-02-10

## 2025-02-06 RX ORDER — OXYCODONE HYDROCHLORIDE 5 MG/1
5-10 TABLET ORAL
Qty: 60 TABLET | Refills: 0 | Status: SHIPPED | OUTPATIENT
Start: 2025-02-10 | End: 2025-02-15

## 2025-02-06 NOTE — PROGRESS NOTES
Name: Vanessa Jolly  YOB: 1950  Gender: female  MRN: [id number]    CC:   Chief Complaint   Patient presents with    Pre-op Exam     Rt tka         HPI: Vanessa Jolly is a 74 y.o. female here for evaluation of right knee pain.  The pain has been present for years and is becoming worse. The pain is primarily on the Medial side.   she describes the pain as a constant ache that is intermittently sharp with activity  The pain is worse with going up and/or down stairs, rising after sitting, walking, and at night, often waking them from sleep  The pain does not radiate down the leg.  Treatment so far has been prescription and OTC NSAIDS which have not effectively relieved pain/inflammation, activity modification, Tylenol, cortisone injections, and visco supplementation injections with little relief.      Allergies   Allergen Reactions    Latex Rash    Acyclovir Other (See Comments) and Rash     fever    Fire Ant Other (See Comments)    Seasonal     Adhesive Tape Rash         Review of Systems:  As per HPI.  Pertinent positives and negatives are addressed with the patient, particularly those related to musculoskeletal concerns.   Non-orthopaedic concerns were referred back to the primary care physician.    PHYSICAL EXAMINATION:   The patient appears their stated age and they are in no distress.  There were no vitals taken for this visit.      The lower extremities are as described below.  Circulation is normal with palpable pedal pulses bilaterally and no edema.  There is no lymph adenopathy in the popliteal or malleolar region.  The skin is without stasis disease distally bilaterally.  Sensation is intact to light touch bilaterally.  There is mild tenderness to palpation over the medial joint line of the right knee(s)  The gait is noted to be with a slight trendelenburg and antalgia  Range of motion is 0-120 degrees on the right and 0-120 degrees on the left.  right knee: There is 2mm of

## 2025-02-06 NOTE — PROGRESS NOTES
[facility]  Pre Operative History and Physical Exam    Patient ID:  Vanessa Jolly  [id number]  74 y.o.  1950    Today: February 6, 2025           CC: Right knee pain    HPI:   The patient has end stage arthritis of the right knee. The patient was evaluated and examined during a consultation prior to this office visit.  There have been no changes to the patient's orthopedic condition since the initial consultation. The patient has failed previous conservative treatment for this condition including antiinflammatories , and lifestyle modifications. The necessity for joint replacement is present. The patient will be admitted the day of surgery for right knee replacement    Past Medical/Surgical History:  Past Medical History:   Diagnosis Date    Age related osteoporosis     Allergic rhinitis     Arrhythmia 2005    SVT in past    Arthritis     osteo    ASD (atrial septal defect)     Chronic venous insufficiency     Dry eyes, bilateral     GERD (gastroesophageal reflux disease)     History of echocardiogram 11/15/2023    Normal left ventricular systolic function. EF-53%. Left ventricle size is normal. Increased wall thickness. Normal wall motion. Normal diastolic function. Mitral Valve: Mild regurgitation. Tricuspid Valve: Mildly elevated RVSP. The estimated RVSP is 38 mmHg. Aorta: Normal sized aortic root. Ao root diameter is 2.9 cm. IVC/SVC: IVC diameter is greater  than 50% during inspiration    Hyperlipidemia     Hypertension     Hypothyroidism     Lipedema     Lipedema     right lower leg    Major depressive disorder, recurrent, mild (HCC)     Obesity (BMI 30-39.9) 11/14/2011    BMI-39.93    Osteoarthritis     Situational anxiety     Stress incontinence of urine     Thyroid disease 20 years    on medication     Past Surgical History:   Procedure Laterality Date    CATARACT EXTRACTION Bilateral     COLONOSCOPY  05/20/2024    EYE SURGERY      Cataracts    HYSTERECTOMY, TOTAL ABDOMINAL

## 2025-02-06 NOTE — H&P (VIEW-ONLY)
MPV 01/21/2025 10.3  9.4 - 12.3 FL Final    nRBC 01/21/2025 0.00  0.0 - 0.2 K/uL Final    **Note: Absolute NRBC parameter is now reported with Hemogram**    Differential Type 01/21/2025 AUTOMATED    Final    Neutrophils % 01/21/2025 68.5  43.0 - 78.0 % Final    Lymphocytes % 01/21/2025 21.3  13.0 - 44.0 % Final    Monocytes % 01/21/2025 7.8  4.0 - 12.0 % Final    Eosinophils % 01/21/2025 1.6  0.5 - 7.8 % Final    Basophils % 01/21/2025 0.5  0.0 - 2.0 % Final    Immature Granulocytes % 01/21/2025 0.3  0.0 - 5.0 % Final    Neutrophils Absolute 01/21/2025 4.39  1.70 - 8.20 K/UL Final    Lymphocytes Absolute 01/21/2025 1.36  0.50 - 4.60 K/UL Final    Monocytes Absolute 01/21/2025 0.50  0.10 - 1.30 K/UL Final    Eosinophils Absolute 01/21/2025 0.10  0.00 - 0.80 K/UL Final    Basophils Absolute 01/21/2025 0.03  0.00 - 0.20 K/UL Final    Immature Granulocytes Absolute 01/21/2025 0.02  0.0 - 0.5 K/UL Final    MRSA by PCR 01/21/2025 Detected (A)  NOTD   Final    Comment: A positive test result does not necessarily indicate the presence of a viable organism. A positive result is indicative of the presence of SA or MRSA DNA.  The BD Max StaphSR assay is intended to aid in the prevention and control of MRSA and SA infections in healthcare settings.  It is not intended to diagnose MRSA or SA infections nor guide or monitor treatment for MRSA/SA infections. A negative result does not preclude nasal colonization.      SA by PCR 01/21/2025 Detected (A)  NOTD   Final   Office Visit on 12/19/2024   Component Date Value Ref Range Status    TSH w Free Thyroid if Abnormal 12/19/2024 7.07 (H)  0.27 - 4.20 UIU/ML Final    Vit D, 25-Hydroxy 12/19/2024 36.2  30.0 - 100.0 ng/mL Final    Comment: Deficiency         <20.0 ng/mL  Insufficiency       20.0-29.9 ng/mL      Hemoglobin A1C 12/19/2024 5.7 (H)  0 - 5.6 % Final    Comment: Reference Range  Normal       <5.7%  Prediabetes  5.7-6.4%  Diabetes     >6.4%      Estimated Avg Glucose

## 2025-02-07 DIAGNOSIS — Z12.31 VISIT FOR SCREENING MAMMOGRAM: Primary | ICD-10-CM

## 2025-02-11 RX ORDER — SODIUM CHLORIDE 0.9 % (FLUSH) 0.9 %
5-40 SYRINGE (ML) INJECTION PRN
Status: CANCELLED | OUTPATIENT
Start: 2025-02-11

## 2025-02-11 RX ORDER — SODIUM CHLORIDE 9 MG/ML
INJECTION, SOLUTION INTRAVENOUS PRN
Status: CANCELLED | OUTPATIENT
Start: 2025-02-11

## 2025-02-11 RX ORDER — SODIUM CHLORIDE 0.9 % (FLUSH) 0.9 %
5-40 SYRINGE (ML) INJECTION EVERY 12 HOURS SCHEDULED
Status: CANCELLED | OUTPATIENT
Start: 2025-02-11

## 2025-02-11 NOTE — DISCHARGE INSTRUCTIONS
regular right after your surgery. This is common. Try to avoid constipation and straining with bowel movements. Drinking enough fluids, taking a stool softener, and eating foods that are good sources of fiber can help you avoid constipation. If you have not had a bowel movement after a couple of days, talk to your doctor.   Medicines    Your doctor will tell you if and when you can restart your medicines. You will also get instructions about taking any new medicines.     If you stopped taking aspirin or some other blood thinner, your doctor will tell you when to start taking it again.     Your doctor may give you a blood-thinning medicine to prevent blood clots. If you take a blood thinner, be sure you get instructions about how to take your medicine safely. Blood thinners can cause serious bleeding problems. This medicine could be in pill form or as a shot (injection). If a shot is needed, your doctor will tell you how to do this.     Be safe with medicines. Take pain medicines exactly as directed.  If the doctor gave you a prescription medicine for pain, take it as prescribed.  If you are not taking a prescription pain medicine, ask your doctor if you can take an over-the-counter medicine.  Plan to take your pain medicine 30 minutes before exercises. It is easier to prevent pain before it starts than to stop it after it has started.     If you think your pain medicine is making you sick to your stomach:  Take your medicine after meals (unless your doctor has told you not to).  Ask your doctor for a different pain medicine.     If your doctor prescribed antibiotics, take them as directed. Do not stop taking them just because you feel better. You need to take the full course of antibiotics.   Incision care    If your doctor told you how to care for your cut (incision), follow your doctor's instructions. You will have a dressing over the cut. A dressing helps the incision heal and protects it. Your doctor will tell

## 2025-02-12 ENCOUNTER — HOSPITAL ENCOUNTER (OUTPATIENT)
Age: 75
Discharge: HOME HEALTH | End: 2025-02-13
Attending: ORTHOPAEDIC SURGERY | Admitting: ORTHOPAEDIC SURGERY
Payer: MEDICARE

## 2025-02-12 ENCOUNTER — ANESTHESIA (OUTPATIENT)
Dept: SURGERY | Age: 75
End: 2025-02-12
Payer: MEDICARE

## 2025-02-12 PROBLEM — Z96.651 STATUS POST RIGHT KNEE REPLACEMENT: Status: ACTIVE | Noted: 2025-02-12

## 2025-02-12 PROCEDURE — 2580000003 HC RX 258: Performed by: STUDENT IN AN ORGANIZED HEALTH CARE EDUCATION/TRAINING PROGRAM

## 2025-02-12 PROCEDURE — 2500000003 HC RX 250 WO HCPCS: Performed by: NURSE ANESTHETIST, CERTIFIED REGISTERED

## 2025-02-12 PROCEDURE — 64447 NJX AA&/STRD FEMORAL NRV IMG: CPT | Performed by: ANESTHESIOLOGY

## 2025-02-12 PROCEDURE — 97165 OT EVAL LOW COMPLEX 30 MIN: CPT

## 2025-02-12 PROCEDURE — 6360000002 HC RX W HCPCS: Performed by: NURSE ANESTHETIST, CERTIFIED REGISTERED

## 2025-02-12 PROCEDURE — 97161 PT EVAL LOW COMPLEX 20 MIN: CPT

## 2025-02-12 PROCEDURE — 6360000002 HC RX W HCPCS: Performed by: PHYSICIAN ASSISTANT

## 2025-02-12 PROCEDURE — C1776 JOINT DEVICE (IMPLANTABLE): HCPCS | Performed by: ORTHOPAEDIC SURGERY

## 2025-02-12 PROCEDURE — 3700000000 HC ANESTHESIA ATTENDED CARE: Performed by: ORTHOPAEDIC SURGERY

## 2025-02-12 PROCEDURE — 6370000000 HC RX 637 (ALT 250 FOR IP): Performed by: PHYSICIAN ASSISTANT

## 2025-02-12 PROCEDURE — 3600000005 HC SURGERY LEVEL 5 BASE: Performed by: ORTHOPAEDIC SURGERY

## 2025-02-12 PROCEDURE — 2580000003 HC RX 258: Performed by: ORTHOPAEDIC SURGERY

## 2025-02-12 PROCEDURE — 2500000003 HC RX 250 WO HCPCS: Performed by: PHYSICIAN ASSISTANT

## 2025-02-12 PROCEDURE — 6360000002 HC RX W HCPCS: Performed by: STUDENT IN AN ORGANIZED HEALTH CARE EDUCATION/TRAINING PROGRAM

## 2025-02-12 PROCEDURE — 3700000001 HC ADD 15 MINUTES (ANESTHESIA): Performed by: ORTHOPAEDIC SURGERY

## 2025-02-12 PROCEDURE — 2720000010 HC SURG SUPPLY STERILE: Performed by: ORTHOPAEDIC SURGERY

## 2025-02-12 PROCEDURE — 94761 N-INVAS EAR/PLS OXIMETRY MLT: CPT

## 2025-02-12 PROCEDURE — 6360000002 HC RX W HCPCS: Performed by: ANESTHESIOLOGY

## 2025-02-12 PROCEDURE — C1713 ANCHOR/SCREW BN/BN,TIS/BN: HCPCS | Performed by: ORTHOPAEDIC SURGERY

## 2025-02-12 PROCEDURE — 97535 SELF CARE MNGMENT TRAINING: CPT

## 2025-02-12 PROCEDURE — 2580000003 HC RX 258: Performed by: NURSE ANESTHETIST, CERTIFIED REGISTERED

## 2025-02-12 PROCEDURE — 6360000002 HC RX W HCPCS: Performed by: ORTHOPAEDIC SURGERY

## 2025-02-12 PROCEDURE — 7100000001 HC PACU RECOVERY - ADDTL 15 MIN: Performed by: ORTHOPAEDIC SURGERY

## 2025-02-12 PROCEDURE — 7100000000 HC PACU RECOVERY - FIRST 15 MIN: Performed by: ORTHOPAEDIC SURGERY

## 2025-02-12 PROCEDURE — 3600000015 HC SURGERY LEVEL 5 ADDTL 15MIN: Performed by: ORTHOPAEDIC SURGERY

## 2025-02-12 PROCEDURE — 97530 THERAPEUTIC ACTIVITIES: CPT

## 2025-02-12 PROCEDURE — 2709999900 HC NON-CHARGEABLE SUPPLY: Performed by: ORTHOPAEDIC SURGERY

## 2025-02-12 DEVICE — CRUCIATE RETAINING FEMORAL
Type: IMPLANTABLE DEVICE | Site: KNEE | Status: FUNCTIONAL
Brand: TRIATHLON

## 2025-02-12 DEVICE — TIBIAL BEARING INSERT - CS
Type: IMPLANTABLE DEVICE | Site: KNEE | Status: FUNCTIONAL
Brand: TRIATHLON

## 2025-02-12 DEVICE — COMPONENT PART KNEE CAPPED K1 STRYKER: Type: IMPLANTABLE DEVICE | Status: FUNCTIONAL

## 2025-02-12 DEVICE — CEMENT BONE 40 GM W/ GENTMYCN HI VISC PALACOS R+G: Type: IMPLANTABLE DEVICE | Site: KNEE | Status: FUNCTIONAL

## 2025-02-12 DEVICE — UNIVERSAL TIBIAL BASEPLATE
Type: IMPLANTABLE DEVICE | Site: KNEE | Status: FUNCTIONAL
Brand: TRIATHLON

## 2025-02-12 DEVICE — ASYMMETRIC PATELLA
Type: IMPLANTABLE DEVICE | Site: KNEE | Status: FUNCTIONAL
Brand: TRIATHLON

## 2025-02-12 RX ORDER — LIDOCAINE HYDROCHLORIDE 10 MG/ML
1 INJECTION, SOLUTION INFILTRATION; PERINEURAL
Status: COMPLETED | OUTPATIENT
Start: 2025-02-12 | End: 2025-02-12

## 2025-02-12 RX ORDER — FENTANYL CITRATE 50 UG/ML
25 INJECTION, SOLUTION INTRAMUSCULAR; INTRAVENOUS
Status: COMPLETED | OUTPATIENT
Start: 2025-02-12 | End: 2025-02-12

## 2025-02-12 RX ORDER — ACETAMINOPHEN 500 MG
1000 TABLET ORAL EVERY 6 HOURS PRN
COMMUNITY

## 2025-02-12 RX ORDER — NALOXONE HYDROCHLORIDE 0.4 MG/ML
INJECTION, SOLUTION INTRAMUSCULAR; INTRAVENOUS; SUBCUTANEOUS PRN
Status: DISCONTINUED | OUTPATIENT
Start: 2025-02-12 | End: 2025-02-12 | Stop reason: HOSPADM

## 2025-02-12 RX ORDER — SODIUM CHLORIDE 0.9 % (FLUSH) 0.9 %
5-40 SYRINGE (ML) INJECTION EVERY 12 HOURS SCHEDULED
Status: DISCONTINUED | OUTPATIENT
Start: 2025-02-12 | End: 2025-02-13 | Stop reason: HOSPADM

## 2025-02-12 RX ORDER — ROPIVACAINE HYDROCHLORIDE 2 MG/ML
INJECTION, SOLUTION EPIDURAL; INFILTRATION; PERINEURAL PRN
Status: DISCONTINUED | OUTPATIENT
Start: 2025-02-12 | End: 2025-02-12 | Stop reason: HOSPADM

## 2025-02-12 RX ORDER — IPRATROPIUM BROMIDE AND ALBUTEROL SULFATE 2.5; .5 MG/3ML; MG/3ML
1 SOLUTION RESPIRATORY (INHALATION)
Status: DISCONTINUED | OUTPATIENT
Start: 2025-02-12 | End: 2025-02-12 | Stop reason: HOSPADM

## 2025-02-12 RX ORDER — ASPIRIN 81 MG/1
81 TABLET ORAL 2 TIMES DAILY
Status: DISCONTINUED | OUTPATIENT
Start: 2025-02-12 | End: 2025-02-13 | Stop reason: HOSPADM

## 2025-02-12 RX ORDER — OXYCODONE HYDROCHLORIDE 5 MG/1
10 TABLET ORAL EVERY 4 HOURS PRN
Status: DISCONTINUED | OUTPATIENT
Start: 2025-02-12 | End: 2025-02-13 | Stop reason: HOSPADM

## 2025-02-12 RX ORDER — OXYCODONE HYDROCHLORIDE 5 MG/1
5 TABLET ORAL PRN
Status: DISCONTINUED | OUTPATIENT
Start: 2025-02-12 | End: 2025-02-12 | Stop reason: HOSPADM

## 2025-02-12 RX ORDER — LABETALOL HYDROCHLORIDE 5 MG/ML
10 INJECTION, SOLUTION INTRAVENOUS
Status: DISCONTINUED | OUTPATIENT
Start: 2025-02-12 | End: 2025-02-12 | Stop reason: HOSPADM

## 2025-02-12 RX ORDER — HALOPERIDOL 5 MG/ML
1 INJECTION INTRAMUSCULAR
Status: DISCONTINUED | OUTPATIENT
Start: 2025-02-12 | End: 2025-02-12 | Stop reason: HOSPADM

## 2025-02-12 RX ORDER — ACETAMINOPHEN 500 MG
1000 TABLET ORAL ONCE
Status: COMPLETED | OUTPATIENT
Start: 2025-02-12 | End: 2025-02-12

## 2025-02-12 RX ORDER — DEXAMETHASONE SODIUM PHOSPHATE 10 MG/ML
INJECTION, SOLUTION INTRAMUSCULAR; INTRAVENOUS
Status: COMPLETED | OUTPATIENT
Start: 2025-02-12 | End: 2025-02-12

## 2025-02-12 RX ORDER — MIDAZOLAM HYDROCHLORIDE 2 MG/2ML
2 INJECTION, SOLUTION INTRAMUSCULAR; INTRAVENOUS
Status: COMPLETED | OUTPATIENT
Start: 2025-02-12 | End: 2025-02-12

## 2025-02-12 RX ORDER — OXYCODONE HYDROCHLORIDE 5 MG/1
10 TABLET ORAL PRN
Status: DISCONTINUED | OUTPATIENT
Start: 2025-02-12 | End: 2025-02-12 | Stop reason: HOSPADM

## 2025-02-12 RX ORDER — HYDROMORPHONE HYDROCHLORIDE 1 MG/ML
1 INJECTION, SOLUTION INTRAMUSCULAR; INTRAVENOUS; SUBCUTANEOUS
Status: DISCONTINUED | OUTPATIENT
Start: 2025-02-12 | End: 2025-02-13 | Stop reason: HOSPADM

## 2025-02-12 RX ORDER — SODIUM CHLORIDE 0.9 % (FLUSH) 0.9 %
5-40 SYRINGE (ML) INJECTION PRN
Status: DISCONTINUED | OUTPATIENT
Start: 2025-02-12 | End: 2025-02-13 | Stop reason: HOSPADM

## 2025-02-12 RX ORDER — PROCHLORPERAZINE EDISYLATE 5 MG/ML
5 INJECTION INTRAMUSCULAR; INTRAVENOUS
Status: DISCONTINUED | OUTPATIENT
Start: 2025-02-12 | End: 2025-02-12 | Stop reason: HOSPADM

## 2025-02-12 RX ORDER — VANCOMYCIN HYDROCHLORIDE 1 G/20ML
INJECTION, POWDER, LYOPHILIZED, FOR SOLUTION INTRAVENOUS PRN
Status: DISCONTINUED | OUTPATIENT
Start: 2025-02-12 | End: 2025-02-12 | Stop reason: HOSPADM

## 2025-02-12 RX ORDER — ONDANSETRON 2 MG/ML
4 INJECTION INTRAMUSCULAR; INTRAVENOUS EVERY 6 HOURS PRN
Status: DISCONTINUED | OUTPATIENT
Start: 2025-02-12 | End: 2025-02-13 | Stop reason: HOSPADM

## 2025-02-12 RX ORDER — FENTANYL CITRATE 50 UG/ML
100 INJECTION, SOLUTION INTRAMUSCULAR; INTRAVENOUS
Status: COMPLETED | OUTPATIENT
Start: 2025-02-12 | End: 2025-02-12

## 2025-02-12 RX ORDER — PROPOFOL 10 MG/ML
INJECTION, EMULSION INTRAVENOUS
Status: DISCONTINUED | OUTPATIENT
Start: 2025-02-12 | End: 2025-02-12 | Stop reason: SDUPTHER

## 2025-02-12 RX ORDER — SODIUM CHLORIDE, SODIUM LACTATE, POTASSIUM CHLORIDE, CALCIUM CHLORIDE 600; 310; 30; 20 MG/100ML; MG/100ML; MG/100ML; MG/100ML
INJECTION, SOLUTION INTRAVENOUS CONTINUOUS
Status: DISCONTINUED | OUTPATIENT
Start: 2025-02-12 | End: 2025-02-12 | Stop reason: HOSPADM

## 2025-02-12 RX ORDER — METHOCARBAMOL 750 MG/1
750 TABLET, FILM COATED ORAL EVERY 8 HOURS PRN
Status: DISCONTINUED | OUTPATIENT
Start: 2025-02-12 | End: 2025-02-13 | Stop reason: HOSPADM

## 2025-02-12 RX ORDER — DIPHENHYDRAMINE HYDROCHLORIDE 50 MG/ML
12.5 INJECTION INTRAMUSCULAR; INTRAVENOUS
Status: DISCONTINUED | OUTPATIENT
Start: 2025-02-12 | End: 2025-02-12 | Stop reason: HOSPADM

## 2025-02-12 RX ORDER — HYDRALAZINE HYDROCHLORIDE 20 MG/ML
10 INJECTION INTRAMUSCULAR; INTRAVENOUS
Status: DISCONTINUED | OUTPATIENT
Start: 2025-02-12 | End: 2025-02-12 | Stop reason: HOSPADM

## 2025-02-12 RX ORDER — GLYCOPYRROLATE 0.2 MG/ML
INJECTION INTRAMUSCULAR; INTRAVENOUS
Status: DISCONTINUED | OUTPATIENT
Start: 2025-02-12 | End: 2025-02-12 | Stop reason: SDUPTHER

## 2025-02-12 RX ORDER — ONDANSETRON 4 MG/1
4 TABLET, ORALLY DISINTEGRATING ORAL EVERY 8 HOURS PRN
Status: DISCONTINUED | OUTPATIENT
Start: 2025-02-12 | End: 2025-02-13 | Stop reason: HOSPADM

## 2025-02-12 RX ORDER — ACETAMINOPHEN 500 MG
1000 TABLET ORAL EVERY 6 HOURS
Status: DISCONTINUED | OUTPATIENT
Start: 2025-02-12 | End: 2025-02-13 | Stop reason: HOSPADM

## 2025-02-12 RX ORDER — LIDOCAINE HYDROCHLORIDE 20 MG/ML
INJECTION, SOLUTION EPIDURAL; INFILTRATION; INTRACAUDAL; PERINEURAL
Status: DISCONTINUED | OUTPATIENT
Start: 2025-02-12 | End: 2025-02-12 | Stop reason: SDUPTHER

## 2025-02-12 RX ORDER — DIPHENHYDRAMINE HCL 25 MG
25 CAPSULE ORAL EVERY 6 HOURS PRN
Status: DISCONTINUED | OUTPATIENT
Start: 2025-02-12 | End: 2025-02-13 | Stop reason: HOSPADM

## 2025-02-12 RX ORDER — SENNA AND DOCUSATE SODIUM 50; 8.6 MG/1; MG/1
1 TABLET, FILM COATED ORAL 2 TIMES DAILY
Status: DISCONTINUED | OUTPATIENT
Start: 2025-02-12 | End: 2025-02-13 | Stop reason: HOSPADM

## 2025-02-12 RX ORDER — DIPHENHYDRAMINE HYDROCHLORIDE 50 MG/ML
25 INJECTION INTRAMUSCULAR; INTRAVENOUS EVERY 6 HOURS PRN
Status: DISCONTINUED | OUTPATIENT
Start: 2025-02-12 | End: 2025-02-13 | Stop reason: HOSPADM

## 2025-02-12 RX ORDER — MIDAZOLAM HYDROCHLORIDE 1 MG/ML
INJECTION, SOLUTION INTRAMUSCULAR; INTRAVENOUS
Status: DISCONTINUED | OUTPATIENT
Start: 2025-02-12 | End: 2025-02-12 | Stop reason: SDUPTHER

## 2025-02-12 RX ORDER — KETOROLAC TROMETHAMINE 30 MG/ML
INJECTION, SOLUTION INTRAMUSCULAR; INTRAVENOUS PRN
Status: DISCONTINUED | OUTPATIENT
Start: 2025-02-12 | End: 2025-02-12 | Stop reason: HOSPADM

## 2025-02-12 RX ORDER — OXYCODONE HYDROCHLORIDE 5 MG/1
5 TABLET ORAL EVERY 4 HOURS PRN
Status: DISCONTINUED | OUTPATIENT
Start: 2025-02-12 | End: 2025-02-13 | Stop reason: HOSPADM

## 2025-02-12 RX ORDER — EPHEDRINE SULFATE 50 MG/ML
INJECTION INTRAVENOUS
Status: DISCONTINUED | OUTPATIENT
Start: 2025-02-12 | End: 2025-02-12 | Stop reason: SDUPTHER

## 2025-02-12 RX ORDER — SODIUM CHLORIDE 9 MG/ML
INJECTION, SOLUTION INTRAVENOUS PRN
Status: DISCONTINUED | OUTPATIENT
Start: 2025-02-12 | End: 2025-02-13 | Stop reason: HOSPADM

## 2025-02-12 RX ADMIN — DEXAMETHASONE SODIUM PHOSPHATE 4 MG: 10 INJECTION, SOLUTION INTRAMUSCULAR; INTRAVENOUS at 12:12

## 2025-02-12 RX ADMIN — MEPIVACAINE HYDROCHLORIDE 60 MG: 20 INJECTION, SOLUTION EPIDURAL; INFILTRATION at 12:45

## 2025-02-12 RX ADMIN — MIDAZOLAM 1 MG: 1 INJECTION INTRAMUSCULAR; INTRAVENOUS at 12:46

## 2025-02-12 RX ADMIN — MIDAZOLAM 2 MG: 1 INJECTION INTRAMUSCULAR; INTRAVENOUS at 12:12

## 2025-02-12 RX ADMIN — OXYCODONE 10 MG: 5 TABLET ORAL at 16:44

## 2025-02-12 RX ADMIN — SENNOSIDES AND DOCUSATE SODIUM 1 TABLET: 50; 8.6 TABLET ORAL at 21:01

## 2025-02-12 RX ADMIN — PHENYLEPHRINE HYDROCHLORIDE 100 MCG: 10 INJECTION INTRAVENOUS at 12:57

## 2025-02-12 RX ADMIN — PROPOFOL 60 MG: 10 INJECTION, EMULSION INTRAVENOUS at 12:52

## 2025-02-12 RX ADMIN — ACETAMINOPHEN 1000 MG: 500 TABLET, FILM COATED ORAL at 21:01

## 2025-02-12 RX ADMIN — Medication 1000 MG: at 13:00

## 2025-02-12 RX ADMIN — PROPOFOL 100 MCG/KG/MIN: 10 INJECTION, EMULSION INTRAVENOUS at 12:53

## 2025-02-12 RX ADMIN — ASPIRIN 81 MG: 81 TABLET, COATED ORAL at 21:01

## 2025-02-12 RX ADMIN — SODIUM CHLORIDE, SODIUM LACTATE, POTASSIUM CHLORIDE, AND CALCIUM CHLORIDE: 600; 310; 30; 20 INJECTION, SOLUTION INTRAVENOUS at 12:33

## 2025-02-12 RX ADMIN — VANCOMYCIN HYDROCHLORIDE 2000 MG: 10 INJECTION, POWDER, LYOPHILIZED, FOR SOLUTION INTRAVENOUS at 10:30

## 2025-02-12 RX ADMIN — SODIUM CHLORIDE, PRESERVATIVE FREE 10 ML: 5 INJECTION INTRAVENOUS at 21:02

## 2025-02-12 RX ADMIN — PHENYLEPHRINE HYDROCHLORIDE 100 MCG: 10 INJECTION INTRAVENOUS at 12:58

## 2025-02-12 RX ADMIN — CEFAZOLIN 2000 MG: 2 INJECTION, POWDER, FOR SOLUTION INTRAMUSCULAR; INTRAVENOUS at 12:58

## 2025-02-12 RX ADMIN — ROPIVACAINE HYDROCHLORIDE 20 ML: 2 INJECTION, SOLUTION EPIDURAL; INFILTRATION at 12:12

## 2025-02-12 RX ADMIN — EPHEDRINE SULFATE 10 MG: 50 INJECTION, SOLUTION INTRAVENOUS at 13:32

## 2025-02-12 RX ADMIN — Medication 1000 MG: at 14:20

## 2025-02-12 RX ADMIN — OXYCODONE 10 MG: 5 TABLET ORAL at 21:01

## 2025-02-12 RX ADMIN — EPHEDRINE SULFATE 10 MG: 50 INJECTION, SOLUTION INTRAVENOUS at 13:59

## 2025-02-12 RX ADMIN — ACETAMINOPHEN 1000 MG: 500 TABLET, FILM COATED ORAL at 10:15

## 2025-02-12 RX ADMIN — LIDOCAINE HYDROCHLORIDE 1 ML: 10 INJECTION, SOLUTION INFILTRATION; PERINEURAL at 10:30

## 2025-02-12 RX ADMIN — GLYCOPYRROLATE 0.2 MG: 0.2 INJECTION INTRAMUSCULAR; INTRAVENOUS at 14:02

## 2025-02-12 RX ADMIN — SODIUM CHLORIDE, SODIUM LACTATE, POTASSIUM CHLORIDE, AND CALCIUM CHLORIDE: 600; 310; 30; 20 INJECTION, SOLUTION INTRAVENOUS at 10:30

## 2025-02-12 RX ADMIN — ACETAMINOPHEN 1000 MG: 500 TABLET, FILM COATED ORAL at 16:44

## 2025-02-12 RX ADMIN — LIDOCAINE HYDROCHLORIDE 80 MG: 20 INJECTION, SOLUTION EPIDURAL; INFILTRATION; INTRACAUDAL; PERINEURAL at 12:52

## 2025-02-12 RX ADMIN — WATER 2000 MG: 1 INJECTION INTRAMUSCULAR; INTRAVENOUS; SUBCUTANEOUS at 21:02

## 2025-02-12 RX ADMIN — FENTANYL CITRATE 100 MCG: 50 INJECTION, SOLUTION INTRAMUSCULAR; INTRAVENOUS at 12:12

## 2025-02-12 RX ADMIN — SODIUM CHLORIDE, SODIUM LACTATE, POTASSIUM CHLORIDE, AND CALCIUM CHLORIDE: 600; 310; 30; 20 INJECTION, SOLUTION INTRAVENOUS at 13:40

## 2025-02-12 ASSESSMENT — PAIN DESCRIPTION - DESCRIPTORS
DESCRIPTORS: DISCOMFORT
DESCRIPTORS: ACHING

## 2025-02-12 ASSESSMENT — KOOS JR
KOOS JR TOTAL INTERVAL SCORE: 52.465
STANDING UPRIGHT: MODERATE
BENDING TO THE FLOOR TO PICK UP OBJECT: MODERATE
GOING UP OR DOWN STAIRS: MODERATE
HOW SEVERE IS YOUR KNEE STIFFNESS AFTER FIRST WAKING IN MORNING: MODERATE
TWISING OR PIVOTING ON KNEE: MODERATE
RISING FROM SITTING: MODERATE
STRAIGHTENING KNEE FULLY: MODERATE

## 2025-02-12 ASSESSMENT — PAIN SCALES - GENERAL
PAINLEVEL_OUTOF10: 4
PAINLEVEL_OUTOF10: 8
PAINLEVEL_OUTOF10: 7

## 2025-02-12 ASSESSMENT — PAIN DESCRIPTION - ORIENTATION: ORIENTATION: RIGHT

## 2025-02-12 ASSESSMENT — PAIN - FUNCTIONAL ASSESSMENT
PAIN_FUNCTIONAL_ASSESSMENT: ACTIVITIES ARE NOT PREVENTED
PAIN_FUNCTIONAL_ASSESSMENT: 0-10

## 2025-02-12 ASSESSMENT — PAIN DESCRIPTION - LOCATION: LOCATION: KNEE

## 2025-02-12 ASSESSMENT — PAIN DESCRIPTION - PAIN TYPE: TYPE: SURGICAL PAIN

## 2025-02-12 ASSESSMENT — PAIN DESCRIPTION - FREQUENCY: FREQUENCY: INTERMITTENT

## 2025-02-12 ASSESSMENT — PAIN DESCRIPTION - ONSET: ONSET: ON-GOING

## 2025-02-12 NOTE — ANESTHESIA POSTPROCEDURE EVALUATION
Department of Anesthesiology  Postprocedure Note    Patient: Vanessa Jolly  MRN: [id number]  YOB: 1950  Date of evaluation: 2/12/2025    Procedure Summary       Date: 02/12/25 Room / Location: [facility] MAIN OR 08 / [facility] MAIN OR    Anesthesia Start: 1233 Anesthesia Stop: 1454    Procedure: rt KNEE TOTAL ARTHROPLASTY ROBOTIC (Right: Knee) Diagnosis:       Primary osteoarthritis of right knee      (Primary osteoarthritis of right knee [M17.11])    Surgeons: Otoniel Law Jr., MD Responsible Provider: Ruben Hernandez MD    Anesthesia Type: spinal ASA Status: 3            Anesthesia Type: No value filed.    Ying Phase I: Ying Score: 9    Ying Phase II:      Anesthesia Post Evaluation    Patient location during evaluation: PACU  Patient participation: complete - patient participated  Level of consciousness: awake and alert  Airway patency: patent  Nausea & Vomiting: no nausea and no vomiting  Cardiovascular status: hemodynamically stable  Respiratory status: acceptable, nonlabored ventilation and spontaneous ventilation  Hydration status: euvolemic  Comments: BP (!) 126/57   Pulse 73   Temp 97.6 °F (36.4 °C) (Temporal)   Resp 22   Ht 1.676 m (5' 6\")   Wt 112.4 kg (247 lb 12.8 oz)   SpO2 95%   BMI 40.00 kg/m²     Multimodal analgesia pain management approach  Pain management: adequate and satisfactory to patient    No notable events documented.

## 2025-02-12 NOTE — ANESTHESIA PROCEDURE NOTES
Spinal Block    Patient location during procedure: OR  End time: 2/12/2025 12:50 PM  Reason for block: primary anesthetic  Staffing  Performed: anesthesiologist   Anesthesiologist: Ruben Hernandez MD  Performed by: Ruben Hernandez MD  Authorized by: Ruben Hernandez MD    Spinal Block  Patient position: sitting  Prep: ChloraPrep  Patient monitoring: cardiac monitor, continuous pulse ox and frequent blood pressure checks  Approach: midline  Location: L3/L4  Provider prep: mask and sterile gloves  Needle  Needle type: pencil-tip   Needle gauge: 25 G  Needle length: 3.5 in  Assessment  Events: SAB placement uncomplicated.  No paresthesia.  Swirl obtained: Yes  CSF: clear  Attempts: 1  Hemodynamics: stable  Additional Notes  3 cc 1% lidocaine local injected at needle insertion site.  Risks/benefits/alternatives discussed including damage to muscle or nerve, bleeding, infection, and a reaction to our medications.    Preanesthetic Checklist  Completed: patient identified, IV checked, risks and benefits discussed, equipment checked, pre-op evaluation, timeout performed, anesthesia consent given, oxygen available and monitors applied/VS acknowledged

## 2025-02-12 NOTE — ANESTHESIA PROCEDURE NOTES
Peripheral Block    Patient location during procedure: pre-op  Reason for block: post-op pain management and at surgeon's request  Start time: 2/12/2025 12:12 PM  End time: 2/12/2025 12:14 PM  Staffing  Performed: anesthesiologist   Anesthesiologist: Ruben Hernandez MD  Performed by: Ruben Hernandez MD  Authorized by: Ruben Hernandez MD    Preanesthetic Checklist  Completed: patient identified, IV checked, site marked, risks and benefits discussed, surgical/procedural consents, equipment checked, pre-op evaluation, timeout performed, anesthesia consent given, oxygen available and monitors applied/VS acknowledged  Peripheral Block   Patient position: supine  Prep: ChloraPrep  Provider prep: mask and sterile gloves  Patient monitoring: cardiac monitor, continuous pulse ox, frequent blood pressure checks, IV access, oxygen and responsive to questions  Block type: Femoral  Adductor canal  Laterality: right  Injection technique: single-shot  Guidance: ultrasound guided    Needle   Needle type: insulated echogenic nerve stimulator needle   Needle gauge: 20 G  Needle localization: ultrasound guidance  Needle length: 10 cm  Assessment   Injection assessment: negative aspiration for heme, no paresthesia on injection, no intravascular symptoms and local visualized surrounding nerve on ultrasound  Slow fractionated injection: yes  Hemodynamics: stable  Outcomes: patient tolerated procedure well and uncomplicated    Additional Notes  3 cc 1% lidocaine local at needle insertion site.  Risks/benefits/alternatives discussed including damage to muscle or nerve, bleeding, infection, and a reaction to our medications.  Needle inserted and placed in close proximity to the nerve under real time ultrasound guidance.  Ultrasound was used to visualize the spread of local anesthetic in close proximity to the nerve being blocked.  All structures appeared anatomically normal and there were no abnormal findings.  Ultrasound

## 2025-02-12 NOTE — ANESTHESIA PRE PROCEDURE
Medication Dose Route Frequency Provider Last Rate Last Admin   • fentaNYL (SUBLIMAZE) injection 25 mcg  25 mcg IntraVENous Once PRN Mahesh Epps MD        Or   • fentaNYL (SUBLIMAZE) injection 100 mcg  100 mcg IntraVENous Once PRN Mahesh Epps MD       • lactated ringers infusion   IntraVENous Continuous Mahesh Epps  mL/hr at 02/12/25 1030 New Bag at 02/12/25 1030   • midazolam PF (VERSED) injection 2 mg  2 mg IntraVENous Once PRN Mahesh Epps MD       • vancomycin (VANCOCIN) 2000 mg in 0.9% sodium chloride 500 mL IVPB  2,000 mg IntraVENous Once Otoniel Law Jr.,  mL/hr at 02/12/25 1030 2,000 mg at 02/12/25 1030   • ceFAZolin (ANCEF) 2,000 mg in sterile water 20 mL IV syringe  2,000 mg IntraVENous On Call to OR Clarissa Henriquez PA           Allergies:    Allergies   Allergen Reactions   • Latex Rash   • Acyclovir Other (See Comments) and Rash     fever   • Fire Ant Other (See Comments)   • Seasonal    • Adhesive Tape Rash       Problem List:    Patient Active Problem List   Diagnosis Code   • Age-related osteoporosis without current pathological fracture M81.0   • Major depressive disorder, recurrent, mild (HCC) F33.0   • Lipedema R60.9   • Lymphedema of both lower extremities I89.0   • Pain in both lower extremities M79.604, M79.605   • Spinal stenosis of lumbar region without neurogenic claudication M48.061   • Situational anxiety F41.8   • Home help needed Z74.2   • Degenerative arthritis of left knee M17.12   • Primary osteoarthritis of left knee M17.12   • Status post left knee replacement Z96.652   • Hyperlipidemia E78.5   • Chronic venous insufficiency I87.2   • Hypertension I10   • Mild mitral regurgitation by prior echocardiogram I34.0   • Primary osteoarthritis of right knee M17.11   • Chronic abdominal pain R10.9, G89.29   • Dysphagia R13.10   • Gastroesophageal reflux disease K21.9   • Hypothyroidism, unspecified E03.9   • Prediabetes R73.03   • Vitamin D deficiency E55.9

## 2025-02-12 NOTE — CARE COORDINATION
Patient is a 74 y.o. year old female admitted for Right TKA . Patient plans to return home on discharge. Order received to arrange home health. Patient without preference towards agency. [facility] to [facility] which was arranged by the surgeon's office. Patient denies any equipment needs as patient has a walker.  Pt without preference towards provider.  Will follow until discharge.      02/12/25 0346   Service Assessment   Patient Orientation Alert and Oriented   Cognition Alert   History Provided By Patient   Services At/After Discharge   Transition of Care Consult (CM Consult) Home Health   Internal Home Health No   Reason Outside Agency Chosen Physician referred to specific agency   Services At/After Discharge Home Health;PT   Mode of Transport at Discharge Self   Confirm Follow Up Transport Self   Condition of Participation: Discharge Planning   The Plan for Transition of Care is related to the following treatment goals: improve mobility   The Patient and/or Patient Representative was provided with a Choice of Provider? Patient   The Patient and/Or Patient Representative agree with the Discharge Plan? Yes   Freedom of Choice list was provided with basic dialogue that supports the patient's individualized plan of care/goals, treatment preferences, and shares the quality data associated with the providers?  Yes

## 2025-02-12 NOTE — OP NOTE
ORTHOPEDICS [facility] [id number] Right 1 Implanted         Signed By: Otoniel Law MD   2/12/2025,  2:16 PM

## 2025-02-12 NOTE — INTERVAL H&P NOTE
Update History & Physical    The patient's History and Physical of February 6, 2025 was reviewed with the patient and I examined the patient. There was no change. The surgical site was confirmed by the patient and me.     Plan: The risks, benefits, expected outcome, and alternative to the recommended procedure have been discussed with the patient. Patient understands and wants to proceed with the procedure.     Electronically signed by NASIR GLEASON JR, MD on 2/12/2025 at 11:36 AM

## 2025-02-12 NOTE — PERIOP NOTE
TRANSFER - OUT REPORT:    Verbal report given to JOSE ALEJANDRO Loera on Vanessa Jolly  being transferred to [facility] for routine post-op       Report consisted of patient's Situation, Background, Assessment and   Recommendations(SBAR).     Information from the following report(s) Nurse Handoff Report and Cardiac Rhythm SR  was reviewed with the receiving nurse.           Lines:   Peripheral IV 02/12/25 Left;Posterior Hand (Active)   Site Assessment Clean, dry & intact 02/12/25 1452   Line Status Infusing 02/12/25 1452   Phlebitis Assessment No symptoms 02/12/25 1452   Infiltration Assessment 0 02/12/25 1452   Alcohol Cap Used No 02/12/25 1452   Dressing Status Clean, dry & intact 02/12/25 1452   Dressing Type Transparent 02/12/25 1452        Opportunity for questions and clarification was provided.      Patient transported with:  O2 @ room air lpm

## 2025-02-13 VITALS
OXYGEN SATURATION: 95 % | TEMPERATURE: 97.7 F | BODY MASS INDEX: 39.82 KG/M2 | HEART RATE: 59 BPM | DIASTOLIC BLOOD PRESSURE: 49 MMHG | WEIGHT: 247.8 LBS | SYSTOLIC BLOOD PRESSURE: 104 MMHG | RESPIRATION RATE: 16 BRPM | HEIGHT: 66 IN

## 2025-02-13 LAB
HCT VFR BLD AUTO: 31.1 % (ref 35.8–46.3)
HGB BLD-MCNC: 10 G/DL (ref 11.7–15.4)

## 2025-02-13 PROCEDURE — 97530 THERAPEUTIC ACTIVITIES: CPT

## 2025-02-13 PROCEDURE — 36415 COLL VENOUS BLD VENIPUNCTURE: CPT

## 2025-02-13 PROCEDURE — 6360000002 HC RX W HCPCS: Performed by: PHYSICIAN ASSISTANT

## 2025-02-13 PROCEDURE — 85018 HEMOGLOBIN: CPT

## 2025-02-13 PROCEDURE — 2500000003 HC RX 250 WO HCPCS: Performed by: PHYSICIAN ASSISTANT

## 2025-02-13 PROCEDURE — 97535 SELF CARE MNGMENT TRAINING: CPT

## 2025-02-13 PROCEDURE — 6370000000 HC RX 637 (ALT 250 FOR IP): Performed by: PHYSICIAN ASSISTANT

## 2025-02-13 PROCEDURE — 85014 HEMATOCRIT: CPT

## 2025-02-13 RX ORDER — PROMETHAZINE HYDROCHLORIDE 25 MG/ML
25 INJECTION, SOLUTION INTRAMUSCULAR; INTRAVENOUS EVERY 6 HOURS PRN
Status: DISCONTINUED | OUTPATIENT
Start: 2025-02-13 | End: 2025-02-13 | Stop reason: HOSPADM

## 2025-02-13 RX ORDER — LEVOTHYROXINE SODIUM 100 UG/1
100 TABLET ORAL
Status: DISCONTINUED | OUTPATIENT
Start: 2025-02-13 | End: 2025-02-13 | Stop reason: HOSPADM

## 2025-02-13 RX ORDER — GABAPENTIN 100 MG/1
100 CAPSULE ORAL 3 TIMES DAILY PRN
Status: DISCONTINUED | OUTPATIENT
Start: 2025-02-13 | End: 2025-02-13 | Stop reason: HOSPADM

## 2025-02-13 RX ORDER — BISACODYL 5 MG/1
5 TABLET, DELAYED RELEASE ORAL DAILY PRN
Status: DISCONTINUED | OUTPATIENT
Start: 2025-02-13 | End: 2025-02-13 | Stop reason: HOSPADM

## 2025-02-13 RX ORDER — ROSUVASTATIN CALCIUM 5 MG/1
5 TABLET, COATED ORAL NIGHTLY
Status: DISCONTINUED | OUTPATIENT
Start: 2025-02-13 | End: 2025-02-13 | Stop reason: HOSPADM

## 2025-02-13 RX ORDER — PANTOPRAZOLE SODIUM 40 MG/1
40 TABLET, DELAYED RELEASE ORAL DAILY PRN
Status: DISCONTINUED | OUTPATIENT
Start: 2025-02-13 | End: 2025-02-13 | Stop reason: HOSPADM

## 2025-02-13 RX ORDER — FUROSEMIDE 20 MG/1
20 TABLET ORAL DAILY
Status: DISCONTINUED | OUTPATIENT
Start: 2025-02-13 | End: 2025-02-13 | Stop reason: HOSPADM

## 2025-02-13 RX ORDER — SENNA AND DOCUSATE SODIUM 50; 8.6 MG/1; MG/1
2 TABLET, FILM COATED ORAL 2 TIMES DAILY PRN
Status: DISCONTINUED | OUTPATIENT
Start: 2025-02-13 | End: 2025-02-13 | Stop reason: HOSPADM

## 2025-02-13 RX ADMIN — ACETAMINOPHEN 1000 MG: 500 TABLET, FILM COATED ORAL at 04:37

## 2025-02-13 RX ADMIN — HYDROMORPHONE HYDROCHLORIDE 1 MG: 1 INJECTION, SOLUTION INTRAMUSCULAR; INTRAVENOUS; SUBCUTANEOUS at 04:50

## 2025-02-13 RX ADMIN — LEVOTHYROXINE SODIUM 100 MCG: 0.1 TABLET ORAL at 08:04

## 2025-02-13 RX ADMIN — WATER 2000 MG: 1 INJECTION INTRAMUSCULAR; INTRAVENOUS; SUBCUTANEOUS at 04:37

## 2025-02-13 RX ADMIN — SENNOSIDES AND DOCUSATE SODIUM 1 TABLET: 50; 8.6 TABLET ORAL at 07:59

## 2025-02-13 RX ADMIN — OXYCODONE 10 MG: 5 TABLET ORAL at 01:52

## 2025-02-13 RX ADMIN — ASPIRIN 81 MG: 81 TABLET, COATED ORAL at 07:59

## 2025-02-13 RX ADMIN — ONDANSETRON 4 MG: 4 TABLET, ORALLY DISINTEGRATING ORAL at 07:59

## 2025-02-13 RX ADMIN — METHOCARBAMOL 750 MG: 750 TABLET ORAL at 04:37

## 2025-02-13 RX ADMIN — FUROSEMIDE 20 MG: 20 TABLET ORAL at 07:59

## 2025-02-13 ASSESSMENT — PAIN DESCRIPTION - PAIN TYPE
TYPE: SURGICAL PAIN
TYPE: SURGICAL PAIN

## 2025-02-13 ASSESSMENT — PAIN DESCRIPTION - ORIENTATION
ORIENTATION: RIGHT
ORIENTATION: RIGHT

## 2025-02-13 ASSESSMENT — PAIN SCALES - GENERAL
PAINLEVEL_OUTOF10: 3
PAINLEVEL_OUTOF10: 5
PAINLEVEL_OUTOF10: 7
PAINLEVEL_OUTOF10: 9

## 2025-02-13 ASSESSMENT — PAIN DESCRIPTION - LOCATION
LOCATION: KNEE
LOCATION: KNEE

## 2025-02-13 ASSESSMENT — PAIN - FUNCTIONAL ASSESSMENT
PAIN_FUNCTIONAL_ASSESSMENT: ACTIVITIES ARE NOT PREVENTED
PAIN_FUNCTIONAL_ASSESSMENT: PREVENTS OR INTERFERES SOME ACTIVE ACTIVITIES AND ADLS

## 2025-02-13 ASSESSMENT — PAIN DESCRIPTION - DESCRIPTORS
DESCRIPTORS: DISCOMFORT
DESCRIPTORS: DISCOMFORT;HEAVINESS

## 2025-02-13 ASSESSMENT — PAIN DESCRIPTION - ONSET
ONSET: ON-GOING
ONSET: ON-GOING

## 2025-02-13 ASSESSMENT — PAIN DESCRIPTION - FREQUENCY
FREQUENCY: CONTINUOUS
FREQUENCY: INTERMITTENT

## 2025-02-13 NOTE — PROGRESS NOTES
02/12/25 2111   Oxygen Therapy/Pulse Ox   O2 Therapy Room air   O2 Device None (Room air)   Pulse 62   SpO2 97 %   Pulse Oximeter Device Mode Continuous   Pulse Oximeter Device Location Left;Finger   $Pulse Oximeter $Spot check (multiple/continuous)     Pt on continuous monitor for HS. Alarm limits set. Pt working on IS.  
ACUTE PHYSICAL THERAPY GOALS:   (Developed with and agreed upon by patient and/or caregiver.)  GOALS (1-4 days):  (1.)Ms. Jolly will move from supine to sit and sit to supine  in bed with STAND BY ASSIST.  (2.)Ms. Jolly will transfer from bed to chair and chair to bed with SUPERVISION using the least restrictive device.  (3.)Ms. Jolly will ambulate with SUPERVISION for 200 feet with the least restrictive device.  (4.)Ms. Jolly will ambulate up/down 3 steps with bilateral  railing with CONTACT GUARD ASSIST.  (5.)Ms. Jolly will increase right knee ROM to 5-85°.  ________________________________________________________________________________________________           PHYSICAL THERAPY: TOTAL KNEE ARTHROPLASTY Initial Assessment and PM  (Link to Caseload Tracking: PT Visit Days : 1  Acknowledge Orders  Time In/Out  PT Charge Capture  Rehab Caseload Tracker  Episode   Vanessa Jolly is a 74 y.o. female   PRIMARY DIAGNOSIS: Status post right knee replacement  Primary osteoarthritis of right knee [M17.11]  Procedure(s) (LRB):  rt KNEE TOTAL ARTHROPLASTY ROBOTIC (Right)  Day of Surgery  Reason for Referral: Pain in Right Knee (M25.561)  Stiffness of Right Knee, Not elsewhere classified (M25.661)  Difficulty in walking, Not elsewhere classified (R26.2)  Outpatient in a bed: Payor: MEDICARE / Plan: MEDICARE PART A AND B / Product Type: *No Product type* /     REHAB RECOMMENDATIONS:   Recommendation to date pending progress:  Setting:  Home Health Therapy    Equipment:    To Be Determined     RANGE OF MOTION:   Will assess at next session     GAIT: I Mod I S SBA CGA Min Mod Max Total  NT x2 Comments:   Level of Assistance [] [] [] [x] [] [] [] [] [] [] []            Weightbearing Status  Right Lower Extremity Weight Bearing: Weight Bearing As Tolerated    Distance  40 feet    Gait Quality Ataxic, Decreased angel , Decreased step clearance, Decreased step length, and Step-to    DME Rolling Walker     Stairs 
Had therapy. Prescriptions were sent to pharmacy. Has follow up appt scheduled with Dr Law. Home therapy arranged. Given extra aquacel for home. Discharge instructions given. Going to car via wheelchair.    
OCCUPATIONAL THERAPY Daily Note, Discharge, and AM      (Link to Caseload Tracking: OT Visit Days: 2  OT Orders   Time  OT Charge Capture  Rehab Caseload Tracker  Episode     Vanessa Jolly is a 74 y.o. female   PRIMARY DIAGNOSIS: Status post right knee replacement  Primary osteoarthritis of right knee [M17.11]  Procedure(s) (LRB):  rt KNEE TOTAL ARTHROPLASTY ROBOTIC (Right)  1 Day Post-Op  Reason for Referral: Pain in Right Knee (M25.561)  Stiffness of Right Knee, Not elsewhere classified (M25.661)  Outpatient in a bed: Payor: MEDICARE / Plan: MEDICARE PART A AND B / Product Type: *No Product type* /     ASSESSMENT:     REHAB RECOMMENDATIONS:   Recommendation to date pending progress:  Setting:  No further skilled occupational therapy after discharge from hospital    Equipment:    Rolling Walker     ASSESSMENT:  Ms. Jolly is s/p right TKA and presents with decreased independence with functional mobility and activities of daily living as compared to baseline level of function and safety. Patient would benefit from skilled Occupational Therapy to maximize independence and safety with self-care task and functional mobility.   Patient seen in room with supportive friend present. Pt worked on bed mobility, edge of bed sitting tolerance,  in room mobility, bathroom mobility, donning clothes and grooming at sink; all with verbal cues for safety and technique. Pt to recliner and left with PT. Pt moves well and should progress well tomorrow. Patient is hopeful to spend the night to better prepare for safe discharge home    2/13/2025   Ms. Jolly is s/p right TKA. Patient seen in room with supportive family present and completed shower and dressing as charted below in ADL grid and is ambulating with rolling walker and stand by assist; all with verbal cues for safety and technique. Pt and family educated on safety with all self care and functional mobility at home, as well as education on Hibiclens to reduce risk of 
Orthopedic Joint Progress Note    2025  Admit Date: 2025  Admit Diagnosis: Primary osteoarthritis of right knee [M17.11]    1 Day Post-Op    Subjective:     Vanessa Jolly is doing well. Pain well-controlled. Ready to go home.       Objective:     PT/OT:     PATIENT MOBILITY    Good    Vital Signs:    Blood pressure (!) 134/57, pulse 60, temperature 98 °F (36.7 °C), temperature source Oral, resp. rate 18, height 1.676 m (5' 6\"), weight 112.4 kg (247 lb 12.8 oz), SpO2 95%.  Temp (24hrs), Av.8 °F (36.6 °C), Min:97.3 °F (36.3 °C), Max:98.6 °F (37 °C)      Pain Control:        Meds:  Current Facility-Administered Medications   Medication Dose Route Frequency    furosemide (LASIX) tablet 20 mg  20 mg Oral Daily    levothyroxine (SYNTHROID) tablet 100 mcg  100 mcg Oral QAM AC    rosuvastatin (CRESTOR) tablet 5 mg  5 mg Oral Nightly    pantoprazole (PROTONIX) tablet 40 mg  40 mg Oral Daily PRN    phenol 1.4 % mouth spray 1 spray  1 spray Mouth/Throat Q2H PRN    melatonin tablet 3 mg  3 mg Oral Nightly PRN    Benzocaine-Menthol (CEPACOL SORE THROAT) 15-2.6 MG lozenge 1 lozenge  1 lozenge Oral Q2H PRN    bisacodyl (DULCOLAX) EC tablet 5 mg  5 mg Oral Daily PRN    promethazine (PHENERGAN) injection 25 mg  25 mg IntraMUSCular Q6H PRN    sennosides-docusate sodium (SENOKOT-S) 8.6-50 MG tablet 2 tablet  2 tablet Oral BID PRN    gabapentin (NEURONTIN) capsule 100 mg  100 mg Oral TID PRN    sodium chloride flush 0.9 % injection 5-40 mL  5-40 mL IntraVENous 2 times per day    sodium chloride flush 0.9 % injection 5-40 mL  5-40 mL IntraVENous PRN    0.9 % sodium chloride infusion   IntraVENous PRN    acetaminophen (TYLENOL) tablet 1,000 mg  1,000 mg Oral Q6H    oxyCODONE (ROXICODONE) immediate release tablet 5 mg  5 mg Oral Q4H PRN    Or    oxyCODONE (ROXICODONE) immediate release tablet 10 mg  10 mg Oral Q4H PRN    HYDROmorphone HCl PF (DILAUDID) injection 1 mg  1 mg IntraVENous Q3H PRN    methocarbamol 
Orthopedic Joint Progress Note    2025  Admit Date: 2025  Admit Diagnosis: Primary osteoarthritis of right knee [M17.11]    1 Day Post-Op    Subjective:     Vanessa Jolly is doing well. Pain well-controlled. Ready to go home.       Objective:     PT/OT:     PATIENT MOBILITY    Good    Vital Signs:    Blood pressure (!) 134/57, pulse 60, temperature 98 °F (36.7 °C), temperature source Oral, resp. rate 18, height 1.676 m (5' 6\"), weight 112.4 kg (247 lb 12.8 oz), SpO2 95%.  Temp (24hrs), Av.8 °F (36.6 °C), Min:97.3 °F (36.3 °C), Max:98.6 °F (37 °C)      Pain Control:        Meds:  Current Facility-Administered Medications   Medication Dose Route Frequency    furosemide (LASIX) tablet 20 mg  20 mg Oral Daily    levothyroxine (SYNTHROID) tablet 100 mcg  100 mcg Oral QAM AC    rosuvastatin (CRESTOR) tablet 5 mg  5 mg Oral Nightly    pantoprazole (PROTONIX) tablet 40 mg  40 mg Oral Daily PRN    phenol 1.4 % mouth spray 1 spray  1 spray Mouth/Throat Q2H PRN    melatonin tablet 3 mg  3 mg Oral Nightly PRN    Benzocaine-Menthol (CEPACOL SORE THROAT) 15-2.6 MG lozenge 1 lozenge  1 lozenge Oral Q2H PRN    bisacodyl (DULCOLAX) EC tablet 5 mg  5 mg Oral Daily PRN    promethazine (PHENERGAN) injection 25 mg  25 mg IntraMUSCular Q6H PRN    sennosides-docusate sodium (SENOKOT-S) 8.6-50 MG tablet 2 tablet  2 tablet Oral BID PRN    gabapentin (NEURONTIN) capsule 100 mg  100 mg Oral TID PRN    sodium chloride flush 0.9 % injection 5-40 mL  5-40 mL IntraVENous 2 times per day    sodium chloride flush 0.9 % injection 5-40 mL  5-40 mL IntraVENous PRN    0.9 % sodium chloride infusion   IntraVENous PRN    acetaminophen (TYLENOL) tablet 1,000 mg  1,000 mg Oral Q6H    oxyCODONE (ROXICODONE) immediate release tablet 5 mg  5 mg Oral Q4H PRN    Or    oxyCODONE (ROXICODONE) immediate release tablet 10 mg  10 mg Oral Q4H PRN    HYDROmorphone HCl PF (DILAUDID) injection 1 mg  1 mg IntraVENous Q3H PRN    methocarbamol 
Spiritual care and pre-op prayer given to patient.    Vicki Victoria M.Div, [facility] / / Bereavement Coordinator  Spiritual Care Department   c: [phone]/ [phone] / [email or web address]     [address] [address]  [email or web address]      
TRANSFER - IN REPORT:    Verbal report received from Sheba Martinez RN on Vanessa Jolly  being received from PACU for routine post-op      Report consisted of patient's Situation, Background, Assessment and   Recommendations(SBAR).     Information from the following report(s) Nurse Handoff Report was reviewed with the receiving nurse.    Opportunity for questions and clarification was provided.      Assessment completed upon patient's arrival to unit and care assumed.   Oriented to room and bed controls. Iceman to knee. Yellow gripper socks to LES. Moving feet slightly, LES remain numb. Family member in room.  
Equipment: Shower chair  Home Equipment: Cane, Walker - Rolling    OBJECTIVE:     LINES / DRAINS / AIRWAY: None    RESTRICTIONS/PRECAUTIONS:       PAIN: VITALS / O2:   Pre Treatment:          Post Treatment: no complain of pain in recliner Vitals          Oxygen        GROSS EVALUATION: INTACT IMPAIRED   (See Comments)   UE AROM [x] []   UE PROM [x] []   Strength [x]       Posture / Balance []  Rw good    Sensation [x]     Coordination [x]       Tone [x]       Edema []    Activity Tolerance [x]       Hand Dominance R [] L []      COGNITION/  PERCEPTION: INTACT IMPAIRED   (See Comments)   Orientation [x]     Vision [x]     Hearing [x]     Cognition  [x]     Perception [x]       MOBILITY: I Mod I S SBA CGA Min Mod Max Total  NT x2 Comments:   Bed Mobility    Rolling [] [] [] [] [] [] [] [] [] [] []    Supine to Sit [] [] [] [x] [] [] [] [] [] [] []    Scooting [] [] [] [x] [] [] [] [] [] [] []    Sit to Supine [] [] [] [] [] [] [] [] [] [] []    Transfers    Sit to Stand [] [] [] [] [x] [] [] [] [] [] []    Bed to Chair [] [] [] [] [x] [] [] [] [] [] []    Stand to Sit [] [] [] [] [x] [] [] [] [] [] []    Tub/Shower [] [] [] [] [] [x] [] [] [] [] []       Toilet [] [] [] [] [] [x] [] [] [] [] []        [] [] [] [] [] [] [] [] [] [] []    I=Independent, Mod I=Modified Independent, S=Supervision/Setup, SBA=Standby Assistance, CGA=Contact Guard Assistance, Min=Minimal Assistance, Mod=Moderate Assistance, Max=Maximal Assistance, Total=Total Assistance, NT=Not Tested    ACTIVITIES OF DAILY LIVING: I Mod I S SBA CGA Min Mod Max Total NT Comments   BASIC ADLs:              Upper Body Bathing [] [] [] [x] [] [] [] [] [] []    Lower Body Bathing [] [] [] [] [x] [] [] [] [] []    Toileting [] [] [] [] [x] [] [] [] [] []    Upper Body Dressing [] [] [] [x] [] [] [] [] [] []    Lower Body Dressing [] [] [] [] [] [x] [] [] [] []    Feeding [] [] [] [] [] [] [] [] [] []    Grooming [] [] [] [x] [] [] [] [] [] []    Personal Device 
patient's response to treatment in order to develop a plan of care.     TREATMENT:   Therapeutic Activity (45 Minutes): Therapeutic activity included Rolling, Supine to Sit, Scooting, Transfer Training, Ambulation on level ground, Stair Training, Sitting balance , and Standing balance to improve functional Activity tolerance, Balance, Coordination, Mobility, Strength, and ROM.    TREATMENT GRID:  THERAPEUTIC  EXERCISES: DATE:  2/12 DATE:  2/13   DATE:      AM PM AM PM AM PM    [] [] [] [] [] []   Ankle Pumps  10 15      Quad Sets  10 15      Gluteal Sets  10 15      Hip Abd/ADduction  10 15      Straight Leg Raises  10 15      Knee Slides  10 15      Short Arc Quads  10 15      Chair Slides                           B = bilateral; AA = active assistive; A = active; P = passive      Educated patient and/or family/caregiver on the following: Assistive Device Indications/Utilization/Safety, Home Exercise Program, Home Safety, Fall Prevention Strategies, Precautions, and Weightbearing status    Interdisciplinary Patient Education   (Reference education tab)    AFTER TREATMENT PRECAUTIONS: Bed/Chair Locked, Call light within reach, Chair, Needs within reach, RN notified, Side rails x2, and Visitors at bedside    INTERDISCIPLINARY COLLABORATION:  RN/ PCT and PT/ PTA    COMPLIANCE WITH PROGRAM/EXERCISE: compliant all of the time.    RECOMMENDATIONS/INTENT FOR NEXT TREATMENT SESSION: Treatment next visit will focus on increasing Ms. Jolly's independence with bed mobility, transfers, gait training, strength/ROM exercises, modalities for pain, and patient education.     TIME IN/OUT:  Time In: 0710  Time Out: 0755  Minutes: 45    TEJAS ORELLANA PTA

## 2025-02-17 DIAGNOSIS — E03.9 ACQUIRED HYPOTHYROIDISM: ICD-10-CM

## 2025-02-17 RX ORDER — LEVOTHYROXINE SODIUM 100 UG/1
100 TABLET ORAL
Qty: 30 TABLET | Refills: 1 | Status: CANCELLED | OUTPATIENT
Start: 2025-02-17

## 2025-02-17 RX ORDER — LEVOTHYROXINE SODIUM 100 UG/1
100 TABLET ORAL
Qty: 90 TABLET | Refills: 1 | Status: SHIPPED | OUTPATIENT
Start: 2025-02-17

## 2025-02-20 DIAGNOSIS — M17.11 PRIMARY OSTEOARTHRITIS OF RIGHT KNEE: ICD-10-CM

## 2025-02-21 RX ORDER — TIZANIDINE 2 MG/1
2 TABLET ORAL 3 TIMES DAILY PRN
Qty: 30 TABLET | Refills: 0 | Status: SHIPPED | OUTPATIENT
Start: 2025-02-21

## 2025-03-04 DIAGNOSIS — Z96.651 HISTORY OF TOTAL KNEE ARTHROPLASTY, RIGHT: Primary | ICD-10-CM

## 2025-03-13 ENCOUNTER — HOSPITAL ENCOUNTER (OUTPATIENT)
Dept: MAMMOGRAPHY | Age: 75
Discharge: HOME | End: 2025-03-16
Payer: MEDICARE

## 2025-03-13 VITALS — BODY MASS INDEX: 38.25 KG/M2 | WEIGHT: 238 LBS | HEIGHT: 66 IN

## 2025-03-13 DIAGNOSIS — Z12.31 VISIT FOR SCREENING MAMMOGRAM: ICD-10-CM

## 2025-03-13 PROCEDURE — 77063 BREAST TOMOSYNTHESIS BI: CPT

## 2025-03-13 PROCEDURE — 77067 SCR MAMMO BI INCL CAD: CPT

## 2025-03-14 ENCOUNTER — HOSPITAL ENCOUNTER (OUTPATIENT)
Dept: PHYSICAL THERAPY | Age: 75
Setting detail: RECURRING SERIES
Discharge: HOME | End: 2025-03-16
Attending: ORTHOPAEDIC SURGERY
Payer: MEDICARE

## 2025-03-14 DIAGNOSIS — M25.561 RIGHT KNEE PAIN, UNSPECIFIED CHRONICITY: ICD-10-CM

## 2025-03-14 DIAGNOSIS — R26.89 OTHER ABNORMALITIES OF GAIT AND MOBILITY: Primary | ICD-10-CM

## 2025-03-14 PROCEDURE — 97162 PT EVAL MOD COMPLEX 30 MIN: CPT

## 2025-03-14 PROCEDURE — 97530 THERAPEUTIC ACTIVITIES: CPT

## 2025-03-14 NOTE — PROGRESS NOTES
Vanessa Jolly  : 1950  Primary: Medicare Part A And B (Medicare)  Secondary: BCBS [facility] [place] [address] [place]  [facility] [place] [address]  Phone: [phone]  Fax: [phone] Plan Frequency: 2x/wk x 90days    Plan of Care/Certification Expiration Date: 25        Plan of Care/Certification Expiration Date:  Plan of Care/Certification Expiration Date: 25    Frequency/Duration: Plan Frequency: 2x/wk x 90days      Time In/Out:   Time In: 1145  Time Out: 1225      PT Visit Info:    Plan Frequency: 2x/wk x 90days  Progress Note Counter: 1      Visit Count:  1    OUTPATIENT PHYSICAL THERAPY:   Treatment Note 3/14/2025       Episode  (Right TKA)               Treatment Diagnosis:    Other abnormalities of gait and mobility  Right knee pain, unspecified chronicity  Medical/Referring Diagnosis:    History of total knee arthroplasty, right [Z96.651]    Referring Physician:  Otoniel Law Jr., MD MD Orders:  PT Eval and Treat   Return MD Appt:    Date of Onset:  Onset Date: 25     Allergies:   Latex, Acyclovir, Fire ant, Seasonal, and Adhesive tape  Restrictions/Precautions:   Fall risk; OP  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 She has full extension. Flexion was 95 degrees today and likely limited due to swelling. Pt has the added complications of lipedema and CVI.     Functional limitations reported at initial Eval: pain/difficulty with walking, sit to stand, household chores, and using stairs. She wants to be mattie to return to water aerobics and walk for exercise.       Subjective Comments: see eval.    Progress Note Counter: 1     Initial Pain Level::      5/10   Post Session Pain Level:        4/10  Medications Last Reviewed:  3/14/2025  Updated Objective Findings:  See

## 2025-03-14 NOTE — THERAPY EVALUATION
will likely benefit from physical therapy to address their impairments and function limitations.    The functional deficits are as follows: pain/difficulty with walking, sit to stand, household chores, and using stairs. She wants to be mattie to return to water aerobics and walk for exercise.     Therapy Problem List: (Impacting functional limitations):   Decreased functional mobility ;Decreased ADL status;Decreased ROM;Decreased body mechanics;Decreased tolerance to work activity;Decreased strength;Decreased endurance;Decreased balance;Decreased coordination;Increased pain;Decreased posture;    Therapy Prognosis:   Therapy Prognosis: Good    Initial Assessment Complexity:   Decision Making: Medium Complexity    PLAN   Effective Dates: 3/14/2025 TO Plan of Care/Certification Expiration Date: 06/12/25     Frequency/Duration: Plan Frequency: 2x/wk x 90days      Interventions Planned (Treatment may consist of any combination of the following):   Current Treatment Recommendations: Strengthening; ROM; Balance training; Transfer training; Endurance training; Functional mobility training; Manual; Neuromuscular re-education; Gait training; Stair training; Pain management; Home exercise program; Safety education & training; Modalities; Therapeutic activities    GOALS: (Goals have been discussed and agreed upon with patient.)  Short-Term Functional Goals: Time Frame: 4 weeks  PT will be compliant with initial HEP.  Pt will decrease worst pain to 4/10 with functional activities.  LEFS score will increase to 55/80 or more to demonstrate improvement in overall self reported function.    Discharge Goals: Time Frame: 8 weeks  PT will be independent with final HEP.   Pt will decrease worst pain to 1/10 with all functional activities.  Pt will increase right knee ROM to (0 to 125deg) to improve functional use of RLE.  LEFS score will increase to 65/80 or more to demonstrate improvement in overall self reported function.  Pt will be

## 2025-03-18 ENCOUNTER — RESULTS FOLLOW-UP (OUTPATIENT)
Dept: MAMMOGRAPHY | Age: 75
End: 2025-03-18

## 2025-03-18 ENCOUNTER — APPOINTMENT (OUTPATIENT)
Dept: PHYSICAL THERAPY | Age: 75
End: 2025-03-18
Attending: ORTHOPAEDIC SURGERY
Payer: MEDICARE

## 2025-03-18 ENCOUNTER — HOSPITAL ENCOUNTER (OUTPATIENT)
Dept: PHYSICAL THERAPY | Age: 75
Setting detail: RECURRING SERIES
Discharge: HOME | End: 2025-03-20
Attending: ORTHOPAEDIC SURGERY
Payer: MEDICARE

## 2025-03-18 PROCEDURE — 97110 THERAPEUTIC EXERCISES: CPT

## 2025-03-18 PROCEDURE — 97530 THERAPEUTIC ACTIVITIES: CPT

## 2025-03-18 PROCEDURE — 97140 MANUAL THERAPY 1/> REGIONS: CPT

## 2025-03-18 NOTE — PROGRESS NOTES
review and update. Manual and verbal cues for muscle activation and correct form. Educated her on wearing compression, self MLD, and diaphragmatic breathing. Pt was educated on initial HEP safety, exercise frequency and duration, symptom control, mechanics, fall risk, and anatomy and physiology of present condition.     Treatment/Session Summary:    Treatment Assessment:   Pt tolerated session well including intervention and therapeutic exercises and activities to simulate functional limitations. Recommend pt have CDT for her lipedema. Plan to add functional exercises progression next session.     Communication/Consultation:  None today  Equipment provided today:  None  Recommendations/Intent for next treatment session: Next visit will focus on advancements to more challenging activities. Progress repetitions, weight, and complexity of functional movement per pt tolerance and as indicated.    >Total Treatment Billable Duration: 39 minutes   Time In: 1417  Time Out: 1456    Ther-Ex: (10 minutes)  Ther-Act: (16 minutes)  Manual Therapy: (0 minutes)  Neuro Re-ed: (0 minutes)  Modalities: (0 minutes)    Meme Prieto, PT, DPT, COMT         Charge Capture  [employer] Portal  Appt Desk   Attendance Report     Future Appointments   Date Time Provider Department Center   3/21/2025  1:00 PM Oralia Badillo, PTA [facility]   3/24/2025  8:45 AM [facility] PHYSICIAL THERAPIST [facility]   3/27/2025  3:00 PM Otoniel Law Jr., MD [facility]   3/28/2025  1:00 PM Oralia Badillo, PTA [facility]   4/2/2025 11:00 AM Oralia Badillo, PTA [facility]   4/4/2025 11:00 AM Meme Prieto, PT [facility]   4/8/2025  2:15 PM Meme Prieto, PT [facility]   4/9/2025 11:45 AM Meme Prieto, PT [facility]   4/16/2025  3:00 PM Meme Prieto, PT [facility]   4/18/2025  1:30 PM Meme Prieto, PT [facility]   7/30/2025  1:30 PM Lu Sandoval APRN - CNP [facility]

## 2025-03-19 ENCOUNTER — HOSPITAL ENCOUNTER (OUTPATIENT)
Dept: ULTRASOUND IMAGING | Age: 75
Discharge: HOME | End: 2025-03-22
Payer: MEDICARE

## 2025-03-19 ENCOUNTER — OFFICE VISIT (OUTPATIENT)
Dept: FAMILY MEDICINE CLINIC | Facility: CLINIC | Age: 75
End: 2025-03-19
Payer: MEDICARE

## 2025-03-19 VITALS
DIASTOLIC BLOOD PRESSURE: 86 MMHG | OXYGEN SATURATION: 99 % | HEART RATE: 70 BPM | HEIGHT: 66 IN | WEIGHT: 241 LBS | SYSTOLIC BLOOD PRESSURE: 142 MMHG | TEMPERATURE: 97.9 F | BODY MASS INDEX: 38.73 KG/M2

## 2025-03-19 DIAGNOSIS — R22.41 LOCALIZED SWELLING OF RIGHT LOWER EXTREMITY: ICD-10-CM

## 2025-03-19 DIAGNOSIS — G89.29 CHRONIC RLQ PAIN: ICD-10-CM

## 2025-03-19 DIAGNOSIS — R22.41 LOCALIZED SWELLING OF RIGHT LOWER EXTREMITY: Primary | ICD-10-CM

## 2025-03-19 DIAGNOSIS — R10.31 CHRONIC RLQ PAIN: ICD-10-CM

## 2025-03-19 DIAGNOSIS — I89.0 LYMPHEDEMA OF BOTH LOWER EXTREMITIES: ICD-10-CM

## 2025-03-19 PROCEDURE — 93971 EXTREMITY STUDY: CPT

## 2025-03-19 PROCEDURE — 1036F TOBACCO NON-USER: CPT

## 2025-03-19 PROCEDURE — 1123F ACP DISCUSS/DSCN MKR DOCD: CPT

## 2025-03-19 PROCEDURE — 1090F PRES/ABSN URINE INCON ASSESS: CPT

## 2025-03-19 PROCEDURE — 3079F DIAST BP 80-89 MM HG: CPT

## 2025-03-19 PROCEDURE — G8427 DOCREV CUR MEDS BY ELIG CLIN: HCPCS

## 2025-03-19 PROCEDURE — 1159F MED LIST DOCD IN RCRD: CPT

## 2025-03-19 PROCEDURE — 99214 OFFICE O/P EST MOD 30 MIN: CPT

## 2025-03-19 PROCEDURE — G8417 CALC BMI ABV UP PARAM F/U: HCPCS

## 2025-03-19 PROCEDURE — 3077F SYST BP >= 140 MM HG: CPT

## 2025-03-19 PROCEDURE — G8399 PT W/DXA RESULTS DOCUMENT: HCPCS

## 2025-03-19 PROCEDURE — 3017F COLORECTAL CA SCREEN DOC REV: CPT

## 2025-03-20 ENCOUNTER — RESULTS FOLLOW-UP (OUTPATIENT)
Dept: ULTRASOUND IMAGING | Age: 75
End: 2025-03-20

## 2025-03-21 ENCOUNTER — HOSPITAL ENCOUNTER (OUTPATIENT)
Dept: PHYSICAL THERAPY | Age: 75
Setting detail: RECURRING SERIES
Discharge: HOME | End: 2025-03-23
Attending: ORTHOPAEDIC SURGERY
Payer: MEDICARE

## 2025-03-21 ENCOUNTER — HOSPITAL ENCOUNTER (OUTPATIENT)
Dept: CT IMAGING | Age: 75
Discharge: HOME | End: 2025-03-24
Payer: MEDICARE

## 2025-03-21 DIAGNOSIS — R10.31 CHRONIC RLQ PAIN: ICD-10-CM

## 2025-03-21 DIAGNOSIS — G89.29 CHRONIC RLQ PAIN: ICD-10-CM

## 2025-03-21 LAB — CREAT BLD-MCNC: 0.74 MG/DL (ref 0.8–1.5)

## 2025-03-21 PROCEDURE — 74177 CT ABD & PELVIS W/CONTRAST: CPT

## 2025-03-21 PROCEDURE — 97110 THERAPEUTIC EXERCISES: CPT

## 2025-03-21 PROCEDURE — 6360000004 HC RX CONTRAST MEDICATION

## 2025-03-21 PROCEDURE — 82565 ASSAY OF CREATININE: CPT

## 2025-03-21 RX ORDER — DIATRIZOATE MEGLUMINE AND DIATRIZOATE SODIUM 660; 100 MG/ML; MG/ML
15 SOLUTION ORAL; RECTAL
Status: DISCONTINUED | OUTPATIENT
Start: 2025-03-21 | End: 2025-03-25 | Stop reason: HOSPADM

## 2025-03-21 RX ORDER — IOPAMIDOL 755 MG/ML
100 INJECTION, SOLUTION INTRAVASCULAR
Status: COMPLETED | OUTPATIENT
Start: 2025-03-21 | End: 2025-03-21

## 2025-03-21 RX ADMIN — IOPAMIDOL 100 ML: 755 INJECTION, SOLUTION INTRAVENOUS at 16:16

## 2025-03-21 RX ADMIN — DIATRIZOATE MEGLUMINE AND DIATRIZOATE SODIUM 15 ML: 660; 100 LIQUID ORAL; RECTAL at 16:16

## 2025-03-21 ASSESSMENT — PAIN SCALES - GENERAL: PAINLEVEL_OUTOF10: 4

## 2025-03-21 NOTE — PROGRESS NOTES
Vanessa Jolly  : 1950  Primary: Medicare Part A And B (Medicare)  Secondary: BCBS [facility] [place] [address]  [facility] [address]  Phone: [phone]  Fax: [phone] Plan Frequency: 2x/wk x 90days    Plan of Care/Certification Expiration Date: 25        Plan of Care/Certification Expiration Date:  Plan of Care/Certification Expiration Date: 25    Frequency/Duration: Plan Frequency: 2x/wk x 90days      Time In/Out:   Time In: 1300  Time Out: 1342  1300    PT Visit Info:    Plan Frequency: 2x/wk x 90days  Progress Note Counter: 3      Visit Count:  3    OUTPATIENT PHYSICAL THERAPY:   Treatment Note 3/21/2025       Episode  (Right TKA)               Treatment Diagnosis:    Other abnormalities of gait and mobility  Right knee pain, unspecified chronicity  Medical/Referring Diagnosis:    History of total knee arthroplasty, right [Z96.651]    Referring Physician:  Otoniel Law Jr., MD MD Orders:  PT Eval and Treat   Return MD Appt:    Date of Onset:  Onset Date: 25     Allergies:   Latex, Acyclovir, Fire ant, Seasonal, and Adhesive tape  Restrictions/Precautions:   Fall risk; OP  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 She has full extension. Flexion was 95 degrees today and likely limited due to swelling. Pt has the added complications of lipedema and CVI.     Functional limitations reported at initial Eval: pain/difficulty with walking, sit to stand, household chores, and using stairs. She wants to be mattie to return to water aerobics and walk for exercise.       Subjective Comments: Had US Wednesday evening to rule out clot.  Everything is good.  I do have an appointment at [facility] next week.  This evening I go for an Abdominal CT with contrast.       Progress Note

## 2025-03-24 ENCOUNTER — HOSPITAL ENCOUNTER (OUTPATIENT)
Dept: PHYSICAL THERAPY | Age: 75
Setting detail: RECURRING SERIES
Discharge: HOME | End: 2025-03-26
Attending: ORTHOPAEDIC SURGERY
Payer: MEDICARE

## 2025-03-24 PROCEDURE — 97110 THERAPEUTIC EXERCISES: CPT

## 2025-03-24 PROCEDURE — 97140 MANUAL THERAPY 1/> REGIONS: CPT

## 2025-03-24 PROCEDURE — 97112 NEUROMUSCULAR REEDUCATION: CPT

## 2025-03-24 NOTE — PROGRESS NOTES
Vanessa Jolly  : 1950  Primary: Medicare Part A And B (Medicare)  [facility] [place] [address] [place]  [facility] [address]  Phone: [phone]  Fax: [phone] Plan Frequency: 2x/wk x 90days    Plan of Care/Certification Expiration Date: 25        Plan of Care/Certification Expiration Date:  Plan of Care/Certification Expiration Date: 25    Frequency/Duration: Plan Frequency: 2x/wk x 90days      Time In/Out:   Time In: 0845  Time Out: 0930      PT Visit Info:    Plan Frequency: 2x/wk x 90days  Progress Note Counter: 3      Visit Count:  4    OUTPATIENT PHYSICAL THERAPY:   Treatment Note 3/24/2025       Episode  (Right TKA)               Treatment Diagnosis:    Other abnormalities of gait and mobility  Right knee pain, unspecified chronicity  Medical/Referring Diagnosis:    History of total knee arthroplasty, right [Z96.651]    Referring Physician:  Otoniel Law Jr., MD MD Orders:  PT Eval and Treat   Return MD Appt:    Date of Onset:  Onset Date: 25     Allergies:   Latex, Acyclovir, Fire ant, Seasonal, and Adhesive tape  Restrictions/Precautions:   Fall risk; OP  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 She has full extension. Flexion was 95 degrees today and likely limited due to swelling. Pt has the added complications of lipedema and CVI.     Functional limitations reported at initial Eval: pain/difficulty with walking, sit to stand, household chores, and using stairs. She wants to be able to return to water aerobics and walk for exercise.       Subjective Comments: Patient reports she is hurting a bit more today, she has a lot on her plate at home, and is still dealing with the swelling in the leg (ultrasound ruled out clot).   She notes she has pain

## 2025-03-25 ENCOUNTER — APPOINTMENT (OUTPATIENT)
Dept: PHYSICAL THERAPY | Age: 75
End: 2025-03-25
Attending: ORTHOPAEDIC SURGERY
Payer: MEDICARE

## 2025-03-26 ENCOUNTER — HOSPITAL ENCOUNTER (OUTPATIENT)
Dept: PHYSICAL THERAPY | Age: 75
Setting detail: RECURRING SERIES
Discharge: HOME | End: 2025-03-29
Payer: MEDICARE

## 2025-03-26 DIAGNOSIS — I89.0 LYMPHEDEMA, NOT ELSEWHERE CLASSIFIED: Primary | ICD-10-CM

## 2025-03-26 DIAGNOSIS — R60.9 LIPEDEMA: ICD-10-CM

## 2025-03-26 PROCEDURE — 97166 OT EVAL MOD COMPLEX 45 MIN: CPT

## 2025-03-26 PROCEDURE — 97535 SELF CARE MNGMENT TRAINING: CPT

## 2025-03-26 ASSESSMENT — PAIN DESCRIPTION - ORIENTATION: ORIENTATION: RIGHT

## 2025-03-26 ASSESSMENT — PAIN DESCRIPTION - LOCATION: LOCATION: LEG

## 2025-03-26 ASSESSMENT — PAIN DESCRIPTION - PAIN TYPE: TYPE: ACUTE PAIN

## 2025-03-26 ASSESSMENT — PAIN DESCRIPTION - DESCRIPTORS: DESCRIPTORS: ACHING;STABBING

## 2025-03-26 ASSESSMENT — PAIN SCALES - GENERAL: PAINLEVEL_OUTOF10: 3

## 2025-03-26 NOTE — PROGRESS NOTES
Vanessa Jolly  : 1950  Primary: Medicare Part A And B (Medicare)  Secondary: BCBS [facility] @ [address]  [place] [address]  Phone: [phone]  Fax: [phone]    Plan of Care/Certification Expiration Date:  Certification Period Expiration Date: 25      Frequency/Duration: Plan Frequency: 1x/week for 90 days      Time In/Out:   Time In: 0902  Time Out: 1010    OT Visit Info:  Plan Frequency: 1x/week for 90 days  Progress Note Due Date: 25  Total # of Visits to Date: 1  Progress Note Counter: 1       Visit Count: 1   OUTPATIENT OCCUPATIONAL THERAPY: Treatment Note 3/26/2025  Episode: (Lymphedema)         Treatment Diagnosis:    Lymphedema, not elsewhere classified  Lipedema  Medical/Referring Diagnosis:   Lymphedema of both lower extremities   Referring Physician:  Lu Sandoval APRN - CNP MD Orders:  OT Eval and Treat   Return MD Appt:  Kendall Law tomorrow (3/27/25)   Date of Onset:  Onset Date:  (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   >Initial Pain Level: Right Leg 3/10  >Post Session Pain Level: Right Leg 3/10  Medications Last Reviewed:  3/26/2025  Updated Objective Findings:  See Evaluation Note from today  Treatment   SELF CARE: (20 minutes):   Procedure(s) (per grid) utilized to improve and/or restore self-care/home management as related to  home management of lymphedema and lipedema . Required moderate

## 2025-03-26 NOTE — THERAPY EVALUATION
Vanessa Jolly  : 1950  Primary: Medicare Part A And B (Medicare)  Secondary: BCBS [facility] @ [address]  [place] [address]  Phone: [phone]  Fax: [phone] Plan Frequency: 1x/week for 90 days    Certification Period Expiration Date: 25        Plan of Care/Certification Expiration Date:  Certification Period Expiration Date: 25      Frequency/Duration: Plan Frequency: 1x/week for 90 days      Time In/Out:   Time In: 902  Time Out: 1010    OT Visit Info:  Plan Frequency: 1x/week for 90 days  Progress Note Due Date: 25  Total # of Visits to Date: 1  Progress Note Counter: 1       Visit Count: 1   OUTPATIENT OCCUPATIONAL THERAPY:Initial Assessment 3/26/2025  Episode: (Lymphedema)           Treatment Diagnosis:    Lymphedema, not elsewhere classified  Lipedema  Medical/Referring Diagnosis:    Lymphedema of both lower extremities   Referring Physician:  Lu Sandoval APRN - CNP MD Orders:  OT Eval and Treat   Return MD Appt:  unknown  Date of Onset:  Onset Date:  (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 Had CT scan re: soreness in right torso on Friday and has not heard results. She reports swelling in LE's when she is on her feet a lot. She reports the swelling has seemed worse in the right leg after the TKA than the left one was. She reports the swelling has finally improved and she can get her shoes on.     2024, patient had liposuction on both hips due to lipedema. She reports being very pleased with the results and she is now able to wear her button down shirts that now fit on the upper body and the hips. She

## 2025-03-27 ENCOUNTER — RESULTS FOLLOW-UP (OUTPATIENT)
Dept: FAMILY MEDICINE CLINIC | Facility: CLINIC | Age: 75
End: 2025-03-27

## 2025-03-27 ENCOUNTER — OFFICE VISIT (OUTPATIENT)
Dept: ORTHOPEDIC SURGERY | Age: 75
End: 2025-03-27

## 2025-03-27 DIAGNOSIS — Z96.651 STATUS POST RIGHT KNEE REPLACEMENT: Primary | ICD-10-CM

## 2025-03-27 NOTE — PROGRESS NOTES
Post-op TKA Visit      03/27/25     Allergies   Allergen Reactions    Latex Rash    Acyclovir Other (See Comments) and Rash     fever    Fire Ant Other (See Comments)    Seasonal     Adhesive Tape Rash     Current Outpatient Medications on File Prior to Visit   Medication Sig Dispense Refill    tiZANidine (ZANAFLEX) 2 MG tablet Take 1 tablet by mouth 3 times daily as needed (muscle spasm) 30 tablet 0    levothyroxine (SYNTHROID) 100 MCG tablet Take 1 tablet by mouth every morning (before breakfast) 90 tablet 1    acetaminophen (TYLENOL) 500 MG tablet Take 2 tablets by mouth every 6 hours as needed for Pain      aspirin (ASPIRIN 81) 81 MG EC tablet Take 1 tablet by mouth in the morning and at bedtime 70 tablet 0    rosuvastatin (CRESTOR) 5 MG tablet Take 1 tablet by mouth nightly 90 tablet 3    levocetirizine (XYZAL) 5 MG tablet Take 1 tablet by mouth nightly      montelukast (SINGULAIR) 10 MG tablet Take 1 tablet by mouth nightly      Iron-Vitamins (GERITOL COMPLETE) TABS Take 1 tablet by mouth daily (Patient not taking: Reported on 3/19/2025)      triamcinolone (NASACORT ALLERGY 24HR) 55 MCG/ACT nasal inhaler 1 spray by Each Nostril route 2 times daily      bisacodyl (DULCOLAX) 5 MG EC tablet Take 4 tablets by mouth daily as needed for Constipation      furosemide (LASIX) 20 MG tablet Take 1 tablet by mouth daily To use as needed for swelling, fluid retention 90 tablet 3    omeprazole (PRILOSEC) 20 MG delayed release capsule Take 1 capsule by mouth every morning (before breakfast) 90 capsule 3    clotrimazole-betamethasone (LOTRISONE) 1-0.05 % cream Apply topically 2 times daily. 45 g 1    hydrOXYzine HCl (ATARAX) 25 MG tablet Take 1 tablet by mouth 4 times daily as needed for Anxiety 120 tablet 3    Glycerin-Polysorbate 80 (REFRESH DRY EYE THERAPY OP) Apply 1 drop to eye in the morning and at bedtime      diphenhydrAMINE (BENADRYL) 25 MG tablet Take 1 tablet by mouth every 8 hours as needed for Itching, Allergies

## 2025-03-28 ENCOUNTER — HOSPITAL ENCOUNTER (OUTPATIENT)
Dept: PHYSICAL THERAPY | Age: 75
Setting detail: RECURRING SERIES
Discharge: HOME | End: 2025-03-30
Attending: ORTHOPAEDIC SURGERY
Payer: MEDICARE

## 2025-03-28 PROCEDURE — 97140 MANUAL THERAPY 1/> REGIONS: CPT

## 2025-03-28 PROCEDURE — 97112 NEUROMUSCULAR REEDUCATION: CPT

## 2025-03-28 PROCEDURE — 97110 THERAPEUTIC EXERCISES: CPT

## 2025-03-28 NOTE — PROGRESS NOTES
Vanessa Jolly  : 1950  Primary: Medicare Part A And B (Medicare)  [facility] [place] [address]  [facility] [address]  Phone: [phone]  Fax: [phone] Plan Frequency: 2x/wk x 90days    Plan of Care/Certification Expiration Date: 25        Plan of Care/Certification Expiration Date:  Plan of Care/Certification Expiration Date: 25    Frequency/Duration: Plan Frequency: 2x/wk x 90days      Time In/Out:   Time In: 1300  Time Out: 1345      PT Visit Info:    Plan Frequency: 2x/wk x 90days  Progress Note Counter: 5      Visit Count:  5    OUTPATIENT PHYSICAL THERAPY:   Treatment Note 3/28/2025       Episode  (Right TKA)               Treatment Diagnosis:    Other abnormalities of gait and mobility  Right knee pain, unspecified chronicity  Medical/Referring Diagnosis:    History of total knee arthroplasty, right [Z96.651]    Referring Physician:  Otoniel Law Jr., MD MD Orders:  PT Eval and Treat   Return MD Appt:    Date of Onset:  Onset Date: 25     Allergies:   Latex, Acyclovir, Fire ant, Seasonal, and Adhesive tape  Restrictions/Precautions:   Fall risk; OP  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 She has full extension. Flexion was 95 degrees today and likely limited due to swelling. Pt has the added complications of lipedema and CVI.     Functional limitations reported at initial Eval: pain/difficulty with walking, sit to stand, household chores, and using stairs. She wants to be able to return to water aerobics and walk for exercise.       Subjective Comments: Patient reports she had some pain in her anterior calf after her last session, and she continues to have lateral knee pain \"I had trouble with my other Itband for my other surgery too\".  She

## 2025-03-31 ENCOUNTER — APPOINTMENT (OUTPATIENT)
Dept: PHYSICAL THERAPY | Age: 75
End: 2025-03-31
Attending: ORTHOPAEDIC SURGERY
Payer: MEDICARE

## 2025-04-01 ENCOUNTER — HOSPITAL ENCOUNTER (OUTPATIENT)
Dept: PHYSICAL THERAPY | Age: 75
Setting detail: RECURRING SERIES
Discharge: HOME | End: 2025-04-04
Payer: MEDICARE

## 2025-04-01 PROCEDURE — 97140 MANUAL THERAPY 1/> REGIONS: CPT

## 2025-04-01 PROCEDURE — 97535 SELF CARE MNGMENT TRAINING: CPT

## 2025-04-01 ASSESSMENT — PAIN SCALES - GENERAL: PAINLEVEL_OUTOF10: 0

## 2025-04-01 NOTE — PROGRESS NOTES
Vanessa Jolly  : 1950  Primary: Medicare Part A And B (Medicare)  Secondary: BCBS [facility] @ [address]  [place] [address]  Phone: [phone]  Fax: [phone]    Plan of Care/Certification Expiration Date:  Certification Period Expiration Date: 25      Frequency/Duration: Plan Frequency: 1x/week for 90 days      Time In/Out:   Time In: 1520  Time Out: 1635    OT Visit Info:  Plan Frequency: 1x/week for 90 days  Progress Note Due Date: 25  Total # of Visits to Date: 2  Progress Note Counter: 2       Visit Count: 2   OUTPATIENT OCCUPATIONAL THERAPY: Treatment Note 2025  Episode: (Lymphedema)         Treatment Diagnosis:    Lymphedema, not elsewhere classified  Lipedema  Medical/Referring Diagnosis:   Lymphedema of both lower extremities   Referring Physician:  Lu Sandoval APRN - CNP MD Orders:  OT Eval and Treat   Return MD Appt:  Kendall Law tomorrow (3/27/25)   Date of Onset:  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   >Initial Pain Level:     0/10  >Post Session Pain Level:     0/10  Medications Last Reviewed:  2025  Updated Objective Findings:   See below:  Lymphedema:  Circumference Measurements (Lower Extremity):                  Date:  3/26 Date:    Date: Date: Date:   CM from base of heel Right Left Right Left Right Left Right Left Right Left   Foot  18 23 23.2 23.3   23.1               Ankle 10 24.3 24  25.3  24.5               Calf 30 43.6 46.9  41.8  46.4               Knee 40

## 2025-04-02 ENCOUNTER — HOSPITAL ENCOUNTER (OUTPATIENT)
Dept: PHYSICAL THERAPY | Age: 75
Setting detail: RECURRING SERIES
Discharge: HOME | End: 2025-04-04
Attending: ORTHOPAEDIC SURGERY
Payer: MEDICARE

## 2025-04-02 PROCEDURE — 97110 THERAPEUTIC EXERCISES: CPT

## 2025-04-02 ASSESSMENT — PAIN SCALES - GENERAL: PAINLEVEL_OUTOF10: 4

## 2025-04-02 NOTE — PROGRESS NOTES
Vanessa Jolly  : 1950  Primary: Medicare Part A And B (Medicare)  Secondary: BCBS [facility] [place] [address]  [facility] [address]  Phone: [phone]  Fax: [phone] Plan Frequency: 2x/wk x 90days    Plan of Care/Certification Expiration Date: 25        Plan of Care/Certification Expiration Date:  Plan of Care/Certification Expiration Date: 25    Frequency/Duration: Plan Frequency: 2x/wk x 90days      Time In/Out:   Time In: 1104  Time Out: 1147      PT Visit Info:    Plan Frequency: 2x/wk x 90days  Progress Note Counter: 6      Visit Count:  6    OUTPATIENT PHYSICAL THERAPY:   Treatment Note 2025       Episode  (Right TKA)               Treatment Diagnosis:    Other abnormalities of gait and mobility  Right knee pain, unspecified chronicity  Medical/Referring Diagnosis:    History of total knee arthroplasty, right [Z96.651]    Referring Physician:  Otoniel Law Jr., MD MD Orders:  PT Eval and Treat   Return MD Appt:    Date of Onset:  Onset Date: 25     Allergies:   Latex, Acyclovir, Fire ant, Seasonal, and Adhesive tape  Restrictions/Precautions:   Fall risk; OP  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 She has full extension. Flexion was 95 degrees today and likely limited due to swelling. Pt has the added complications of lipedema and CVI.     Functional limitations reported at initial Eval: pain/difficulty with walking, sit to stand, household chores, and using stairs. She wants to be able to return to water aerobics and walk for exercise.       Subjective Comments: Patient reports she went to the pool one day last week.  She stated she saw the OT Tuesday for the lymphedema.     Progress Note Counter: 6     Initial Pain Level::     4/10   Post Session

## 2025-04-04 ENCOUNTER — HOSPITAL ENCOUNTER (OUTPATIENT)
Dept: PHYSICAL THERAPY | Age: 75
Setting detail: RECURRING SERIES
Discharge: HOME | End: 2025-04-06
Attending: ORTHOPAEDIC SURGERY
Payer: MEDICARE

## 2025-04-04 PROCEDURE — 97110 THERAPEUTIC EXERCISES: CPT

## 2025-04-04 PROCEDURE — 97140 MANUAL THERAPY 1/> REGIONS: CPT

## 2025-04-04 PROCEDURE — 97530 THERAPEUTIC ACTIVITIES: CPT

## 2025-04-04 NOTE — PROGRESS NOTES
Vanessa Jolly  : 1950  Primary: Medicare Part A And B (Medicare)  Secondary: BCBS [facility] [place] [address]  [facility] [address]  Phone: [phone]  Fax: [phone] Plan Frequency: 2x/wk x 90days    Plan of Care/Certification Expiration Date: 25        Plan of Care/Certification Expiration Date:  Plan of Care/Certification Expiration Date: 25    Frequency/Duration: Plan Frequency: 2x/wk x 90days      Time In/Out:   Time In: 1101  Time Out: 1140      PT Visit Info:    Plan Frequency: 2x/wk x 90days  Progress Note Counter: 7      Visit Count:  7    OUTPATIENT PHYSICAL THERAPY:   Treatment Note 2025       Episode  (Right TKA)               Treatment Diagnosis:    Other abnormalities of gait and mobility  Right knee pain, unspecified chronicity  Medical/Referring Diagnosis:    History of total knee arthroplasty, right [Z96.651]    Referring Physician:  Otoniel Law Jr., MD MD Orders:  PT Eval and Treat   Return MD Appt:    Date of Onset:  Onset Date: 25     Allergies:   Latex, Acyclovir, Fire ant, Seasonal, and Adhesive tape  Restrictions/Precautions:   Fall risk; OP  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 She has full extension. Flexion was 95 degrees today and likely limited due to swelling. Pt has the added complications of lipedema and CVI.     Functional limitations reported at initial Eval: pain/difficulty with walking, sit to stand, household chores, and using stairs. She wants to be able to return to water aerobics and walk for exercise.       Subjective Comments: Pt reports her knee is improving. She is still seeing OT for the lymphedema, and says it is going great. She is also excited she is back to doing some pool exercises.     Progress Note

## 2025-04-07 ENCOUNTER — APPOINTMENT (OUTPATIENT)
Dept: PHYSICAL THERAPY | Age: 75
End: 2025-04-07
Attending: ORTHOPAEDIC SURGERY
Payer: MEDICARE

## 2025-04-08 ENCOUNTER — HOSPITAL ENCOUNTER (OUTPATIENT)
Dept: PHYSICAL THERAPY | Age: 75
Setting detail: RECURRING SERIES
Discharge: HOME | End: 2025-04-10
Attending: ORTHOPAEDIC SURGERY
Payer: MEDICARE

## 2025-04-08 PROCEDURE — 97140 MANUAL THERAPY 1/> REGIONS: CPT

## 2025-04-08 PROCEDURE — 97530 THERAPEUTIC ACTIVITIES: CPT

## 2025-04-08 PROCEDURE — 97110 THERAPEUTIC EXERCISES: CPT

## 2025-04-08 NOTE — PROGRESS NOTES
[facility]   4/24/2025  8:45 AM Meme Prieto, PT [facility]   7/30/2025  1:30 PM Lu Sandoval APRN - CNP [facility]   8/5/2025  3:15 PM Otoniel Law Jr., MD [facility]

## 2025-04-09 ENCOUNTER — HOSPITAL ENCOUNTER (OUTPATIENT)
Dept: PHYSICAL THERAPY | Age: 75
Setting detail: RECURRING SERIES
Discharge: HOME | End: 2025-04-11
Attending: ORTHOPAEDIC SURGERY
Payer: MEDICARE

## 2025-04-09 PROCEDURE — 97110 THERAPEUTIC EXERCISES: CPT

## 2025-04-09 PROCEDURE — 97530 THERAPEUTIC ACTIVITIES: CPT

## 2025-04-09 PROCEDURE — 97140 MANUAL THERAPY 1/> REGIONS: CPT

## 2025-04-09 ASSESSMENT — PAIN SCALES - GENERAL: PAINLEVEL_OUTOF10: 3

## 2025-04-09 NOTE — PROGRESS NOTES
Vanessa Jolly  : 1950  Primary: Medicare Part A And B (Medicare)  Secondary: BCBS [facility] @ [place] [address]  [facility] [address]  Phone: [phone]  Fax: [phone] Plan Frequency: 2x/wk x 90days    Plan of Care/Certification Expiration Date: 25        Plan of Care/Certification Expiration Date:  Plan of Care/Certification Expiration Date: 25    Frequency/Duration: Plan Frequency: 2x/wk x 90days      Time In/Out:   Time In: 1145  Time Out: 1230      PT Visit Info:    Plan Frequency: 2x/wk x 90days  Total # of Visits to Date: 9  Progress Note Counter: 9      Visit Count:  9    OUTPATIENT PHYSICAL THERAPY:   Treatment Note 2025       Episode  (Right TKA)               Treatment Diagnosis:    Other abnormalities of gait and mobility  Right knee pain, unspecified chronicity  Medical/Referring Diagnosis:    History of total knee arthroplasty, right [Z96.651]    Referring Physician:  Otoniel Law Jr., MD MD Orders:  PT Eval and Treat   Return MD Appt:    Date of Onset:  Onset Date: 25     Allergies:   Latex, Acyclovir, Fire ant, Seasonal, and Adhesive tape  Restrictions/Precautions:   Fall risk; OP  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 She has full extension. Flexion was 95 degrees today and likely limited due to swelling. Pt has the added complications of lipedema and CVI.     Functional limitations reported at initial Eval: pain/difficulty with walking, sit to stand, household chores, and using stairs. She wants to be able to return to water aerobics and walk for exercise.       Subjective Comments: Pain hits her more on the lateral side of knee, at the end of the day, When it flares up.     Progress Note Counter: 9     Initial Pain Level::     35/10

## 2025-04-10 ENCOUNTER — HOSPITAL ENCOUNTER (OUTPATIENT)
Dept: PHYSICAL THERAPY | Age: 75
Setting detail: RECURRING SERIES
Discharge: HOME | End: 2025-04-13
Payer: MEDICARE

## 2025-04-10 PROCEDURE — 97140 MANUAL THERAPY 1/> REGIONS: CPT

## 2025-04-10 PROCEDURE — 97535 SELF CARE MNGMENT TRAINING: CPT

## 2025-04-10 ASSESSMENT — PAIN SCALES - GENERAL: PAINLEVEL_OUTOF10: 0

## 2025-04-10 NOTE — PROGRESS NOTES
Vanessa Jolly  : 1950  Primary: Medicare Part A And B (Medicare)  Secondary: BCBS [facility] @ [address]  [place] [address]  Phone: [phone]  Fax: [phone]    Plan of Care/Certification Expiration Date:  Certification Period Expiration Date: 25      Frequency/Duration: Plan Frequency: 1x/week for 90 days      Time In/Out:   Time In: 1515  Time Out: 1610    OT Visit Info:  Plan Frequency: 1x/week for 90 days  Progress Note Due Date: 25  Total # of Visits to Date: 3  Progress Note Counter: 3       Visit Count: 3   OUTPATIENT OCCUPATIONAL THERAPY: Treatment Note 4/10/2025  Episode: (Lymphedema)         Treatment Diagnosis:    Lymphedema, not elsewhere classified  Lipedema  Medical/Referring Diagnosis:   Lymphedema of both lower extremities   Referring Physician:  Lu Sandoval APRN - CNP MD Orders:  OT Eval and Treat   Return MD Appt:  Seeparadise Law tomorrow (3/27/25)   Date of Onset:  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 She reports she is wearing her compression socks but not the liposuction compression garment because both are in the wash but she has been wearing them regularly. She reports she had her 3 month follow up after the liposuction this week.   >Initial Pain Level:     0/10  >Post Session Pain Level:     0/10  Medications Last Reviewed:  4/10/2025  Updated Objective Findings:   See below:  Lymphedema:  Circumference Measurements (Lower Extremity):                  Date:  3/26 Date:    Date:  4/10 Date: Date:

## 2025-04-14 ENCOUNTER — HOSPITAL ENCOUNTER (OUTPATIENT)
Dept: PHYSICAL THERAPY | Age: 75
Setting detail: RECURRING SERIES
Discharge: HOME | End: 2025-04-17
Payer: MEDICARE

## 2025-04-14 PROCEDURE — 97140 MANUAL THERAPY 1/> REGIONS: CPT

## 2025-04-14 PROCEDURE — 97535 SELF CARE MNGMENT TRAINING: CPT

## 2025-04-14 ASSESSMENT — PAIN SCALES - GENERAL: PAINLEVEL_OUTOF10: 0

## 2025-04-14 NOTE — PROGRESS NOTES
today  Equipment provided today:  None  Recommendations/Intent for next treatment session: Next visit will focus on education re: manual lymph drainage and fitting for and assessing compression garments.    >Total Treatment Billable Duration: 60 minutes  OT Individual Minutes  Time In: 1505  Time Out: 1605  Minutes: 60    SAM HENSLEY, QUIRINO     Charge Capture   Events  [employer] Portal  Appt Desk  Attendance Report     Future Appointments   Date Time Provider Department Center   4/18/2025  1:30 PM Meme Prieto, PT [facility]   4/21/2025  8:45 AM Meme Prieto, PT [facility]   4/24/2025  8:45 AM Meme Prieto, PT [facility]   7/30/2025  1:30 PM Lu Sandoval APRN - CNP [facility]   8/5/2025  3:15 PM Otoniel Law Jr., MD [facility]

## 2025-04-16 ENCOUNTER — APPOINTMENT (OUTPATIENT)
Dept: PHYSICAL THERAPY | Age: 75
End: 2025-04-16
Attending: ORTHOPAEDIC SURGERY
Payer: MEDICARE

## 2025-04-18 ENCOUNTER — HOSPITAL ENCOUNTER (OUTPATIENT)
Dept: PHYSICAL THERAPY | Age: 75
Setting detail: RECURRING SERIES
Discharge: HOME | End: 2025-04-20
Attending: ORTHOPAEDIC SURGERY
Payer: MEDICARE

## 2025-04-18 ENCOUNTER — TELEPHONE (OUTPATIENT)
Dept: FAMILY MEDICINE CLINIC | Facility: CLINIC | Age: 75
End: 2025-04-18

## 2025-04-18 PROCEDURE — 97140 MANUAL THERAPY 1/> REGIONS: CPT

## 2025-04-18 PROCEDURE — 97110 THERAPEUTIC EXERCISES: CPT

## 2025-04-18 PROCEDURE — 97530 THERAPEUTIC ACTIVITIES: CPT

## 2025-04-18 NOTE — THERAPY EVALUATION
Vanessa Jolly  : 1950  Primary: Medicare Part A And B (Medicare)  Secondary: BCBS [facility] @ [place] [address]   [facility] [address]  Phone: [phone]  Fax: [phone] Plan Frequency: 2x/wk x 90days  Plan of Care/Certification Expiration Date: 25        Plan of Care/Certification Expiration Date:  Plan of Care/Certification Expiration Date: 25    Frequency/Duration: Plan Frequency: 2x/wk x 90days      Time In/Out:   Time In: 1330  Time Out: 1410      PT Visit Info:    Plan Frequency: 2x/wk x 90days  Total # of Visits to Date: 9  Progress Note Counter: 10      Visit Count:  10                OUTPATIENT PHYSICAL THERAPY:             Progress Report 2025               Episode (Right TKA)         Treatment Diagnosis:     Other abnormalities of gait and mobility  Right knee pain, unspecified chronicity  Medical/Referring Diagnosis:    History of total knee arthroplasty, right [Z96.651]    Referring Physician:  Otoniel Law Jr., MD MD Orders:  PT Eval and Treat   Return MD Appt: 3/27/25  Date of Onset:  Onset Date: 25     Allergies:  Latex, Acyclovir, Fire ant, Seasonal, and Adhesive tape  Restrictions/Precautions:    Fall risk; OP  Hypersensitivity in BLE's due to lipedema      Medications Last Reviewed:  2025     Note: the information below is from initial eval unless noted otherwise.    SUBJECTIVE   History of Injury/Illness (Reason for Referral):  Pt is a 75 yo female referred to physical therapy s/p right TKA 25. She finished HHPT this Tuesday.  She says she has had a rough time with this knee. She is currently using a cane and hoping to get rid of it. She has lipedema and CVI. She had lipo in her hips in December and is trying to lose weight. Pt is not in     Pain/Symptom Location: generalized right knee            Aggravating Factors/ Functional limitations: pain/difficulty with walking, sit to stand,

## 2025-04-18 NOTE — TELEPHONE ENCOUNTER
----- Message from Lucia FRAIRE sent at 4/18/2025 10:11 AM EDT -----  Regarding: ECC Escalation To Practice  ECC Escalation To Practice      Type of Escalation: Red Flag Symptom  --------------------------------------------------------------------------------------------------------------------------    Information for Provider:  Patient is looking for appointment for: Symptom   Reasons for Message: Unable to reach practice / Patient disconnected    Additional Information : Patient experience fatigue.  --------------------------------------------------------------------------------------------------------------------------    Relationship to Patient: Self  Call Back Info: OK to leave message on voicemail  Preferred Call Back Number:  [phone]

## 2025-04-18 NOTE — PROGRESS NOTES
reps x 10 sec. OP from PTA     LAQ     SLR 2 x 10 reps    SAQ     STS  5x   Bridge  2 x 10 reps.     Hook lying - clams  2 x 10 reps with YTB    gait 30ft x 4 reps with cane, 30ft x 4 reps without cane and with mirror yes   TKE     Standing - hip abduction     Sidestepping at rail     Heel/toe rocks for balance at rail     Tandem and SLS  30s x 2 ea B   Step ups     minisquats     Ambulation      Sidelying SLR     Standing hip abd     Standing hip flxn     Standing hip ext     Gastroc/soleus stretches     HEP: Heel slides, LAQ, heel prop, diaphragmatic breathing, self MLD; added supine clams; added 3-way hip  Band colors given to pt: [x] Yellow          [] Red          [x] Green          [] Blue          [] Grey      Manual Therapy: PROM, patellar mobs, scar mobilization, STM quads/ITband; joint distraction with oscillation for pain relief;     Pt Education: Discussed plan of care and progress toward goals. Reviewed and updated HEP. Educated on exercise frequency and duration, symptom control, mechanics, fall risk, and anatomy and physiology of present condition.     Treatment/Session Summary:    Treatment Assessment:   Pt has had some improvement in ROM and strength and states function and pain intensity are improving also. Continued skilled intervention is required to address the listed impairments and functional limitations to meet the patient's set goals. The current plan is to continue at 1x/wk.     Communication/Consultation:  None today  Equipment provided today: None  Recommendations/Intent for next treatment session: Next visit will focus on advancements to more challenging activities. Progress repetitions, weight, and complexity of functional movement per pt tolerance and as indicated.    >Total Treatment Billable Duration: 38 minutes   Time In: 1330  Time Out: 1410    Ther-Ex: (16 minutes)  Ther-Act: (8 minutes)  Manual Therapy: (14 minutes)  Neuro Re-ed: (0 minutes)  Modalities: (0 minutes)    Meme

## 2025-04-21 ENCOUNTER — APPOINTMENT (OUTPATIENT)
Dept: PHYSICAL THERAPY | Age: 75
End: 2025-04-21
Attending: ORTHOPAEDIC SURGERY
Payer: MEDICARE

## 2025-04-22 ENCOUNTER — OFFICE VISIT (OUTPATIENT)
Dept: FAMILY MEDICINE CLINIC | Facility: CLINIC | Age: 75
End: 2025-04-22
Payer: MEDICARE

## 2025-04-22 VITALS
HEIGHT: 66 IN | OXYGEN SATURATION: 98 % | TEMPERATURE: 97.7 F | DIASTOLIC BLOOD PRESSURE: 72 MMHG | HEART RATE: 75 BPM | SYSTOLIC BLOOD PRESSURE: 114 MMHG | WEIGHT: 245.8 LBS | BODY MASS INDEX: 39.5 KG/M2

## 2025-04-22 DIAGNOSIS — E55.9 VITAMIN D DEFICIENCY: ICD-10-CM

## 2025-04-22 DIAGNOSIS — E03.9 ACQUIRED HYPOTHYROIDISM: ICD-10-CM

## 2025-04-22 DIAGNOSIS — R73.03 PREDIABETES: ICD-10-CM

## 2025-04-22 DIAGNOSIS — R53.82 CHRONIC FATIGUE: ICD-10-CM

## 2025-04-22 DIAGNOSIS — E78.2 MIXED HYPERLIPIDEMIA: ICD-10-CM

## 2025-04-22 DIAGNOSIS — D64.9 ANEMIA, UNSPECIFIED TYPE: ICD-10-CM

## 2025-04-22 DIAGNOSIS — M17.11 PRIMARY OSTEOARTHRITIS OF RIGHT KNEE: ICD-10-CM

## 2025-04-22 DIAGNOSIS — R53.82 CHRONIC FATIGUE: Primary | ICD-10-CM

## 2025-04-22 LAB
25(OH)D3 SERPL-MCNC: 32.4 NG/ML (ref 30–100)
ALBUMIN SERPL-MCNC: 3.7 G/DL (ref 3.2–4.6)
ALBUMIN/GLOB SERPL: 1 (ref 1–1.9)
ALP SERPL-CCNC: 253 U/L (ref 35–104)
ALT SERPL-CCNC: 41 U/L (ref 8–45)
ANION GAP SERPL CALC-SCNC: 13 MMOL/L (ref 7–16)
AST SERPL-CCNC: 40 U/L (ref 15–37)
BASOPHILS # BLD: 0.05 K/UL (ref 0–0.2)
BASOPHILS NFR BLD: 0.6 % (ref 0–2)
BILIRUB SERPL-MCNC: 0.3 MG/DL (ref 0–1.2)
BUN SERPL-MCNC: 28 MG/DL (ref 8–23)
CALCIUM SERPL-MCNC: 9.7 MG/DL (ref 8.8–10.2)
CHLORIDE SERPL-SCNC: 104 MMOL/L (ref 98–107)
CHOLEST SERPL-MCNC: 178 MG/DL (ref 0–200)
CO2 SERPL-SCNC: 26 MMOL/L (ref 20–29)
CREAT SERPL-MCNC: 0.67 MG/DL (ref 0.6–1.1)
DIFFERENTIAL METHOD BLD: NORMAL
EOSINOPHIL # BLD: 0.27 K/UL (ref 0–0.8)
EOSINOPHIL NFR BLD: 3.5 % (ref 0.5–7.8)
ERYTHROCYTE [DISTWIDTH] IN BLOOD BY AUTOMATED COUNT: 14.1 % (ref 11.9–14.6)
EST. AVERAGE GLUCOSE BLD GHB EST-MCNC: 121 MG/DL
FERRITIN SERPL-MCNC: 53 NG/ML (ref 8–388)
GLOBULIN SER CALC-MCNC: 3.6 G/DL (ref 2.3–3.5)
GLUCOSE SERPL-MCNC: 97 MG/DL (ref 70–99)
HBA1C MFR BLD: 5.8 % (ref 0–5.6)
HCT VFR BLD AUTO: 38.7 % (ref 35.8–46.3)
HDLC SERPL-MCNC: 68 MG/DL (ref 40–60)
HDLC SERPL: 2.6 (ref 0–5)
HGB BLD-MCNC: 12.3 G/DL (ref 11.7–15.4)
IMM GRANULOCYTES # BLD AUTO: 0.02 K/UL (ref 0–0.5)
IMM GRANULOCYTES NFR BLD AUTO: 0.3 % (ref 0–5)
IRON SERPL-MCNC: 52 UG/DL (ref 35–100)
LDLC SERPL CALC-MCNC: 92 MG/DL (ref 0–100)
LYMPHOCYTES # BLD: 1.59 K/UL (ref 0.5–4.6)
LYMPHOCYTES NFR BLD: 20.5 % (ref 13–44)
MCH RBC QN AUTO: 28.7 PG (ref 26.1–32.9)
MCHC RBC AUTO-ENTMCNC: 31.8 G/DL (ref 31.4–35)
MCV RBC AUTO: 90.2 FL (ref 82–102)
MONOCYTES # BLD: 0.52 K/UL (ref 0.1–1.3)
MONOCYTES NFR BLD: 6.7 % (ref 4–12)
NEUTS SEG # BLD: 5.31 K/UL (ref 1.7–8.2)
NEUTS SEG NFR BLD: 68.4 % (ref 43–78)
NRBC # BLD: 0 K/UL (ref 0–0.2)
PLATELET # BLD AUTO: 284 K/UL (ref 150–450)
PMV BLD AUTO: 11.3 FL (ref 9.4–12.3)
POTASSIUM SERPL-SCNC: 4.1 MMOL/L (ref 3.5–5.1)
PROT SERPL-MCNC: 7.3 G/DL (ref 6.3–8.2)
RBC # BLD AUTO: 4.29 M/UL (ref 4.05–5.2)
SODIUM SERPL-SCNC: 143 MMOL/L (ref 136–145)
T4 FREE SERPL-MCNC: 1.5 NG/DL (ref 0.9–1.7)
TRIGL SERPL-MCNC: 90 MG/DL (ref 0–150)
TSH W FREE THYROID IF ABNORMAL: 0.24 UIU/ML (ref 0.27–4.2)
VLDLC SERPL CALC-MCNC: 18 MG/DL (ref 6–23)
WBC # BLD AUTO: 7.8 K/UL (ref 4.3–11.1)

## 2025-04-22 PROCEDURE — 1090F PRES/ABSN URINE INCON ASSESS: CPT

## 2025-04-22 PROCEDURE — G8399 PT W/DXA RESULTS DOCUMENT: HCPCS

## 2025-04-22 PROCEDURE — 1159F MED LIST DOCD IN RCRD: CPT

## 2025-04-22 PROCEDURE — 1125F AMNT PAIN NOTED PAIN PRSNT: CPT

## 2025-04-22 PROCEDURE — 1123F ACP DISCUSS/DSCN MKR DOCD: CPT

## 2025-04-22 PROCEDURE — G2211 COMPLEX E/M VISIT ADD ON: HCPCS

## 2025-04-22 PROCEDURE — 1036F TOBACCO NON-USER: CPT

## 2025-04-22 PROCEDURE — 3078F DIAST BP <80 MM HG: CPT

## 2025-04-22 PROCEDURE — G8427 DOCREV CUR MEDS BY ELIG CLIN: HCPCS

## 2025-04-22 PROCEDURE — 99214 OFFICE O/P EST MOD 30 MIN: CPT

## 2025-04-22 PROCEDURE — 3017F COLORECTAL CA SCREEN DOC REV: CPT

## 2025-04-22 PROCEDURE — G8417 CALC BMI ABV UP PARAM F/U: HCPCS

## 2025-04-22 PROCEDURE — 3074F SYST BP LT 130 MM HG: CPT

## 2025-04-22 RX ORDER — OXYBUTYNIN CHLORIDE 5 MG/1
5 TABLET, EXTENDED RELEASE ORAL DAILY
COMMUNITY

## 2025-04-22 RX ORDER — TIZANIDINE 2 MG/1
2 TABLET ORAL 3 TIMES DAILY PRN
Qty: 90 TABLET | Refills: 0 | Status: SHIPPED | OUTPATIENT
Start: 2025-04-22

## 2025-04-22 NOTE — PROGRESS NOTES
Vanessa Jolly (: 1950) is a 74 y.o. female, established patient, here for evaluation of the following chief complaint(s):  Fatigue (Increased fatigue x 10 weeks; did have knee surgery in February )       ASSESSMENT/PLAN:  1. Chronic fatigue  -     CBC with Auto Differential; Future  -     Comprehensive Metabolic Panel; Future  -     TSH reflex to FT4; Future  -     Hemoglobin A1C; Future  -     Iron; Future  -     Ferritin; Future  2. Acquired hypothyroidism  -     TSH reflex to FT4; Future  3. Prediabetes  -     Hemoglobin A1C; Future  4. Mixed hyperlipidemia  -     Lipid Panel; Future  5. Anemia, unspecified type  -     CBC with Auto Differential; Future  -     Iron; Future  -     Ferritin; Future  6. Vitamin D deficiency  -     Vitamin D 25 Hydroxy; Future  7. Primary osteoarthritis of right knee  -     tiZANidine (ZANAFLEX) 2 MG tablet; Take 1 tablet by mouth 3 times daily as needed (muscle spasm), Disp-90 tablet, R-0Normal    Will obtain CBC, iron panel, CMP, A1C, TSH, lipids, and Vitamin D to assess fatigue.     Refilling Zanaflex per patient request.     SUBJECTIVE/OBJECTIVE:  HPI    Severe fatigue:     Endorses increased fatigue x10 weeks. She a knee replacement in February, notes reviewed, noted to have completed PT.     She feels this type of fatigue is more than what she has experienced in the past. She is currently still in PT/OT along with her spouse who is also in PT. She also relays she has to help her  daily with certain tasks, does not have home health present.     Denies overt SOB, dizziness, headaches. Denies black/tarry stools, vomiting, abdominal pain.     She is also requesting a refill on Zanaflex, endorses intermittent spasms to RLE.     Vitals:    25 1326   BP: 114/72   Pulse: 75   Temp: 97.7 °F (36.5 °C)   SpO2: 98%         Physical Exam  Constitutional:       Appearance: Normal appearance. She is not ill-appearing.   Cardiovascular:      Rate and Rhythm: Normal

## 2025-04-24 ENCOUNTER — RESULTS FOLLOW-UP (OUTPATIENT)
Dept: FAMILY MEDICINE CLINIC | Facility: CLINIC | Age: 75
End: 2025-04-24

## 2025-04-24 ENCOUNTER — HOSPITAL ENCOUNTER (OUTPATIENT)
Dept: PHYSICAL THERAPY | Age: 75
Setting detail: RECURRING SERIES
Discharge: HOME | End: 2025-04-26
Attending: ORTHOPAEDIC SURGERY
Payer: MEDICARE

## 2025-04-24 DIAGNOSIS — R74.8 ELEVATED ALKALINE PHOSPHATASE LEVEL: Primary | ICD-10-CM

## 2025-04-24 DIAGNOSIS — E03.9 ACQUIRED HYPOTHYROIDISM: ICD-10-CM

## 2025-04-24 DIAGNOSIS — E03.9 ACQUIRED HYPOTHYROIDISM: Primary | ICD-10-CM

## 2025-04-24 DIAGNOSIS — R74.8 ELEVATED ALKALINE PHOSPHATASE LEVEL: ICD-10-CM

## 2025-04-24 LAB — GGT SERPL-CCNC: 135 U/L (ref 5–36)

## 2025-04-24 PROCEDURE — 97110 THERAPEUTIC EXERCISES: CPT

## 2025-04-24 PROCEDURE — 97140 MANUAL THERAPY 1/> REGIONS: CPT

## 2025-04-24 PROCEDURE — 97530 THERAPEUTIC ACTIVITIES: CPT

## 2025-04-24 RX ORDER — LEVOTHYROXINE SODIUM 88 UG/1
88 TABLET ORAL
Qty: 90 TABLET | Refills: 0 | Status: SHIPPED | OUTPATIENT
Start: 2025-04-24

## 2025-04-24 NOTE — RESULT ENCOUNTER NOTE
Please call and let patient know that her GGT is elevated indicating a potential liver dysfunction occurring. She saw [facility] in the past, does she want to call and see them or would she like a referral to [facility]?

## 2025-04-24 NOTE — PROGRESS NOTES
Vanessa Jolly  : 1950  Primary: Medicare Part A And B (Medicare)  Secondary: BCBS [facility] [place] [address]  [facility] [address]  Phone: [phone]  Fax: [phone] Plan Frequency: 2x/wk x 90days    Plan of Care/Certification Expiration Date: 25        Plan of Care/Certification Expiration Date:  Plan of Care/Certification Expiration Date: 25    Frequency/Duration: Plan Frequency: 2x/wk x 90days      Time In/Out:   Time In: 849  Time Out: 927      PT Visit Info:    Plan Frequency: 2x/wk x 90days  Total # of Visits to Date: 9  Progress Note Counter: 11      Visit Count:  11    OUTPATIENT PHYSICAL THERAPY:   Treatment Note 2025       Episode  (Right TKA)               Treatment Diagnosis:    Other abnormalities of gait and mobility  Right knee pain, unspecified chronicity  Medical/Referring Diagnosis:    History of total knee arthroplasty, right [Z96.651]    Referring Physician:  Otoniel Law Jr., MD MD Orders:  PT Eval and Treat   Return MD Appt:    Date of Onset:  Onset Date: 25     Allergies:   Latex, Acyclovir, Fire ant, Seasonal, and Adhesive tape  Restrictions/Precautions:   Fall risk; OP  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 She has full extension. Flexion was 95 degrees today and likely limited due to swelling. Pt has the added complications of lipedema and CVI.     Functional limitations reported at initial Eval: pain/difficulty with walking, sit to stand, household chores, and using stairs. She wants to be able to return to water aerobics and walk for exercise.     Subjective Comments: Pt says her knee is doing well. She went to the [facility] [place] and did water aerobics twice this week already.     Progress Note Counter: 11     Initial Pain Level::

## 2025-04-24 NOTE — RESULT ENCOUNTER NOTE
Please call and let patient know that her thyroid level was slightly low and she may be overmedicated with the 100 mcg dosage. I'd like to decrease this to 88 mcg and recheck in 6 weeks. Please schedule a lab appt.     I'm also adding on another lab to further assess liver function as her alkaline phosphatase level continues to increase. This will be added on to her labs already.

## 2025-04-25 ENCOUNTER — HOSPITAL ENCOUNTER (OUTPATIENT)
Dept: PHYSICAL THERAPY | Age: 75
Setting detail: RECURRING SERIES
Discharge: HOME | End: 2025-04-28
Payer: MEDICARE

## 2025-04-25 PROCEDURE — 97140 MANUAL THERAPY 1/> REGIONS: CPT

## 2025-04-25 PROCEDURE — 97535 SELF CARE MNGMENT TRAINING: CPT

## 2025-04-25 ASSESSMENT — PAIN SCALES - GENERAL: PAINLEVEL_OUTOF10: 0

## 2025-04-25 NOTE — PROGRESS NOTES
Vanessa Jolly  : 1950  Primary: Medicare Part A And B (Medicare)  Secondary: BCBS [facility] @ [address]  [place] [address]  Phone: [phone]  Fax: [phone]    Plan of Care/Certification Expiration Date:  Certification Period Expiration Date: 25      Frequency/Duration: Plan Frequency: 1x/week for 90 days      Time In/Out:   Time In: 1400  Time Out: 1445    OT Visit Info:  Plan Frequency: 1x/week for 90 days  Progress Note Due Date: 25  Total # of Visits to Date: 5  Progress Note Counter: 5       Visit Count: 5   OUTPATIENT OCCUPATIONAL THERAPY: Treatment Note 2025  Episode: (Lymphedema)         Treatment Diagnosis:    Lymphedema, not elsewhere classified  Lipedema  Medical/Referring Diagnosis:   Lymphedema of both lower extremities   Referring Physician:  Lu Sandoval APRN - CNP MD Orders:  OT Eval and Treat   Return MD Appt:  Seeparadise Law tomorrow (3/27/25)   Date of Onset:  Onset Date:  (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  >Initial Pain Level:     0/10  >Post Session Pain Level:     0/10  Medications Last Reviewed:  2025  Updated Objective Findings:   See below:  Lymphedema:  Circumference Measurements (Lower Extremity):                  Date:  3/26 Date:    Date:  4/10 Date:   Date:     CM from base of heel Right Left Right Left Right Left Right Left Right Left   Foot  18 23 23.2 23.3   23.1  23 22.5   22.5 22.9  22.5  22.9    Ankle 10 24.3 24  25.3  24.5  25  24.5  24.5  24.4  24.2  24.8

## 2025-05-01 ENCOUNTER — APPOINTMENT (OUTPATIENT)
Dept: PHYSICAL THERAPY | Age: 75
End: 2025-05-01
Payer: MEDICARE

## 2025-05-02 ENCOUNTER — HOSPITAL ENCOUNTER (OUTPATIENT)
Dept: PHYSICAL THERAPY | Age: 75
Setting detail: RECURRING SERIES
Discharge: HOME | End: 2025-05-04
Attending: ORTHOPAEDIC SURGERY
Payer: MEDICARE

## 2025-05-02 PROCEDURE — 97110 THERAPEUTIC EXERCISES: CPT

## 2025-05-02 PROCEDURE — 97140 MANUAL THERAPY 1/> REGIONS: CPT

## 2025-05-02 ASSESSMENT — PAIN SCALES - GENERAL: PAINLEVEL_OUTOF10: 3

## 2025-05-02 NOTE — PROGRESS NOTES
Vanessa Jolly  : 1950  Primary: Medicare Part A And B (Medicare)  Secondary: BCBS [facility] [place] [address]  [facility] [address]  Phone: [phone]  Fax: [phone] Plan Frequency: 2x/wk x 90days    Plan of Care/Certification Expiration Date: 25        Plan of Care/Certification Expiration Date:  Plan of Care/Certification Expiration Date: 25    Frequency/Duration: Plan Frequency: 2x/wk x 90days      Time In/Out:   Time In: 1430  Time Out: 1514      PT Visit Info:    Plan Frequency: 2x/wk x 90days  Total # of Visits to Date: 12  Progress Note Counter: 2      Visit Count:  12    OUTPATIENT PHYSICAL THERAPY:   Treatment Note 2025       Episode  (Right TKA)               Treatment Diagnosis:    Other abnormalities of gait and mobility  Right knee pain, unspecified chronicity  Medical/Referring Diagnosis:    History of total knee arthroplasty, right [Z96.651]    Referring Physician:  Otoniel Law Jr., MD MD Orders:  PT Eval and Treat   Return MD Appt:    Date of Onset:  Onset Date: 25     Allergies:   Latex, Acyclovir, Fire ant, Seasonal, and Adhesive tape  Restrictions/Precautions:   Fall risk; OP  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 She has full extension. Flexion was 95 degrees today and likely limited due to swelling. Pt has the added complications of lipedema and CVI.     Functional limitations reported at initial Eval: pain/difficulty with walking, sit to stand, household chores, and using stairs. She wants to be able to return to water aerobics and walk for exercise.     Subjective Comments: Patient has complaints of back of R hip and down into her foot aching.  She states it interferes with her walking.     Progress Note Counter: 2     Initial Pain

## 2025-05-05 ENCOUNTER — HOSPITAL ENCOUNTER (OUTPATIENT)
Dept: PHYSICAL THERAPY | Age: 75
Setting detail: RECURRING SERIES
Discharge: HOME | End: 2025-05-08
Payer: MEDICARE

## 2025-05-05 PROCEDURE — 97140 MANUAL THERAPY 1/> REGIONS: CPT

## 2025-05-05 PROCEDURE — 97535 SELF CARE MNGMENT TRAINING: CPT

## 2025-05-05 ASSESSMENT — PAIN SCALES - GENERAL: PAINLEVEL_OUTOF10: 0

## 2025-05-05 NOTE — PROGRESS NOTES
Vanessa Jolly  : 1950  Primary: Medicare Part A And B (Medicare)  Secondary: BCBS [facility] @ [address]  [place] [address]  Phone: [phone]  Fax: [phone]    Plan of Care/Certification Expiration Date:  Certification Period Expiration Date: 25      Frequency/Duration: Plan Frequency: 1x/week for 90 days      Time In/Out:   Time In: 1030  Time Out: 1110    OT Visit Info:  Plan Frequency: 1x/week for 90 days  Progress Note Due Date: 25  Total # of Visits to Date: 6  Progress Note Counter: 6       Visit Count: 6   OUTPATIENT OCCUPATIONAL THERAPY: Treatment Note 2025  Episode: (Lymphedema)         Treatment Diagnosis:    Lymphedema, not elsewhere classified  Lipedema  Medical/Referring Diagnosis:   Lymphedema of both lower extremities   Referring Physician:  Lu Sandoval APRN - CNP MD Orders:  OT Eval and Treat   Return MD Appt:  Kendall Law tomorrow (3/27/25)   Date of Onset:  Onset Date:  (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  >Initial Pain Level:     0/10  >Post Session Pain Level:     0/10  Medications Last Reviewed:  2025  Updated Objective Findings:   See below:  Lymphedema:  Circumference Measurements (Lower Extremity):   Date:   Date:   Date: Date: Date:   CM from base of heel Right Left Right Left Right Left Right Left Right Left   Foot  18 23 22.7           Ankle 10 23.5 23.9           Calf 30 41.3 453.           Knee 40 46.1 50           Thigh 55 65.9 67.9

## 2025-05-08 ENCOUNTER — OFFICE VISIT (OUTPATIENT)
Dept: ORTHOPEDIC SURGERY | Age: 75
End: 2025-05-08
Payer: MEDICARE

## 2025-05-08 ENCOUNTER — HOSPITAL ENCOUNTER (OUTPATIENT)
Dept: PHYSICAL THERAPY | Age: 75
Setting detail: RECURRING SERIES
Discharge: HOME | End: 2025-05-10
Attending: ORTHOPAEDIC SURGERY
Payer: MEDICARE

## 2025-05-08 DIAGNOSIS — M79.18 BUTTOCK PAIN: ICD-10-CM

## 2025-05-08 DIAGNOSIS — M25.551 RIGHT HIP PAIN: Primary | ICD-10-CM

## 2025-05-08 PROCEDURE — G8399 PT W/DXA RESULTS DOCUMENT: HCPCS | Performed by: ORTHOPAEDIC SURGERY

## 2025-05-08 PROCEDURE — 1159F MED LIST DOCD IN RCRD: CPT | Performed by: ORTHOPAEDIC SURGERY

## 2025-05-08 PROCEDURE — 1160F RVW MEDS BY RX/DR IN RCRD: CPT | Performed by: ORTHOPAEDIC SURGERY

## 2025-05-08 PROCEDURE — 1090F PRES/ABSN URINE INCON ASSESS: CPT | Performed by: ORTHOPAEDIC SURGERY

## 2025-05-08 PROCEDURE — 97110 THERAPEUTIC EXERCISES: CPT

## 2025-05-08 PROCEDURE — 97530 THERAPEUTIC ACTIVITIES: CPT

## 2025-05-08 PROCEDURE — 1123F ACP DISCUSS/DSCN MKR DOCD: CPT | Performed by: ORTHOPAEDIC SURGERY

## 2025-05-08 PROCEDURE — G8417 CALC BMI ABV UP PARAM F/U: HCPCS | Performed by: ORTHOPAEDIC SURGERY

## 2025-05-08 PROCEDURE — 1036F TOBACCO NON-USER: CPT | Performed by: ORTHOPAEDIC SURGERY

## 2025-05-08 PROCEDURE — G8427 DOCREV CUR MEDS BY ELIG CLIN: HCPCS | Performed by: ORTHOPAEDIC SURGERY

## 2025-05-08 PROCEDURE — 97140 MANUAL THERAPY 1/> REGIONS: CPT

## 2025-05-08 PROCEDURE — 3017F COLORECTAL CA SCREEN DOC REV: CPT | Performed by: ORTHOPAEDIC SURGERY

## 2025-05-08 PROCEDURE — 99214 OFFICE O/P EST MOD 30 MIN: CPT | Performed by: ORTHOPAEDIC SURGERY

## 2025-05-08 RX ORDER — GABAPENTIN 100 MG/1
100-300 CAPSULE ORAL NIGHTLY
Qty: 90 CAPSULE | Refills: 0 | Status: SHIPPED | OUTPATIENT
Start: 2025-05-08 | End: 2025-06-07

## 2025-05-08 RX ORDER — PREDNISONE 10 MG/1
TABLET ORAL
Qty: 1 EACH | Refills: 0 | Status: SHIPPED | OUTPATIENT
Start: 2025-05-08

## 2025-05-08 NOTE — PROGRESS NOTES
Vanessa Jolly  : 1950  Primary: Medicare Part A And B (Medicare)  Secondary: BCBS [facility] [place] [address] [facility] [address]  Phone: [phone]  Fax: [phone] Plan Frequency: 2x/wk x 90days    Plan of Care/Certification Expiration Date: 25        Plan of Care/Certification Expiration Date:  Plan of Care/Certification Expiration Date: 25    Frequency/Duration: Plan Frequency: 2x/wk x 90days      Time In/Out:   Time In: 1330  Time Out: 1412      PT Visit Info:    Plan Frequency: 2x/wk x 90days  Total # of Visits to Date: 12  Progress Note Counter: 3      Visit Count:  13    OUTPATIENT PHYSICAL THERAPY:   Treatment Note 2025       Episode  (Right TKA)               Treatment Diagnosis:    Other abnormalities of gait and mobility  Right knee pain, unspecified chronicity  Medical/Referring Diagnosis:    History of total knee arthroplasty, right [Z96.651]    Referring Physician:  Otoniel Law Jr., MD MD Orders:  PT Eval and Treat   Return MD Appt:    Date of Onset:  Onset Date: 25     Allergies:   Latex, Acyclovir, Fire ant, Seasonal, and Adhesive tape  Restrictions/Precautions:   Fall risk; OP  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 She has full extension. Flexion was 95 degrees today and likely limited due to swelling. Pt has the added complications of lipedema and CVI.     Functional limitations reported at initial Eval: pain/difficulty with walking, sit to stand, household chores, and using stairs. She wants to be able to return to water aerobics and walk for exercise.     Subjective Comments: Pt saw Dr. Law. She plans to have an MRI regarding her recent back pain that goes to her RLE.     Progress Note Counter: 3     Initial Pain Level::      4/10

## 2025-05-08 NOTE — PROGRESS NOTES
Name: Vanessa Jolly  YOB: 1950  Gender: female  MRN: [id number]    CC:   Chief Complaint   Patient presents with    Hip Pain     Right buttocks          HPI: Vanessa Jolly is a 74 y.o. female with a  has a past medical history of Age related osteoporosis, Allergic rhinitis, Arrhythmia, Arthritis, ASD (atrial septal defect), Breast cyst, Chronic venous insufficiency, Dry eyes, bilateral, GERD (gastroesophageal reflux disease), History of echocardiogram, Hyperlipidemia, Hypertension, Hypothyroidism, Lipedema, Lipedema, Major depressive disorder, recurrent, mild, Obesity (BMI 30-39.9), Osteoarthritis, Situational anxiety, Stress incontinence of urine, and Thyroid disease.here for evaluation of right hip pain  There was not an acute injury  Regarding the hip pain, it has been present for months and is becoming worse. The pain is primarily posterior buttock area and lower back  she describes the pain as throbbing, a constant ache that is intermittently sharp with activity, and spasm  The pain is worse with rising after sitting, walking, at night, often waking them from sleep, and laying down  The pain does radiate down the leg.  she denies numbness and tingling down the leg.   Treatment so far has been activity modification, heat, and NSAIDs  with little relief.        Review of Systems  As per HPI.  Pertinent positives and negatives are addressed with the patient, particularly those related to musculoskeletal concerns.  Non-orthopaedic concerns were referred back to the primary care physician.      Allergies   Allergen Reactions    Latex Rash    Acyclovir Other (See Comments) and Rash     fever    Fire Ant Other (See Comments)    Seasonal     Adhesive Tape Rash                    PHYSICAL EXAMINATION:   The patient is alert and oriented, in no distress.  There were no vitals filed for this visit.       The gait is noted to be with a slight trendelenburg and antalgia  There is moderate tenderness  Report given to Forest Rob RN to assume care. All questions and concerns answered.       Denis Mills RN  07/31/23 1709

## 2025-05-16 ENCOUNTER — HOSPITAL ENCOUNTER (OUTPATIENT)
Dept: PHYSICAL THERAPY | Age: 75
Setting detail: RECURRING SERIES
Discharge: HOME | End: 2025-05-18
Attending: ORTHOPAEDIC SURGERY
Payer: MEDICARE

## 2025-05-16 ENCOUNTER — HOSPITAL ENCOUNTER (OUTPATIENT)
Dept: PHYSICAL THERAPY | Age: 75
Setting detail: RECURRING SERIES
Discharge: HOME | End: 2025-05-19
Payer: MEDICARE

## 2025-05-16 PROCEDURE — 97110 THERAPEUTIC EXERCISES: CPT

## 2025-05-16 PROCEDURE — 97140 MANUAL THERAPY 1/> REGIONS: CPT

## 2025-05-16 PROCEDURE — 97530 THERAPEUTIC ACTIVITIES: CPT

## 2025-05-16 PROCEDURE — 97535 SELF CARE MNGMENT TRAINING: CPT

## 2025-05-16 ASSESSMENT — PAIN SCALES - GENERAL: PAINLEVEL_OUTOF10: 0

## 2025-05-16 NOTE — PROGRESS NOTES
Vanessa Jolly  : 1950  Primary: Medicare Part A And B (Medicare)  Secondary: BCBS [facility] [place] [address]  [facility] [address]  Phone: [phone]  Fax: [phone] Plan Frequency: 2x/wk x 90days    Plan of Care/Certification Expiration Date: 25        Plan of Care/Certification Expiration Date:  Plan of Care/Certification Expiration Date: 25    Frequency/Duration: Plan Frequency: 2x/wk x 90days      Time In/Out:   Time In: 931  Time Out: 1010      PT Visit Info:    Plan Frequency: 2x/wk x 90days  Total # of Visits to Date: 12  Progress Note Counter: 4      Visit Count:  14    OUTPATIENT PHYSICAL THERAPY:   Treatment Note 2025       Episode  (Right TKA)               Treatment Diagnosis:    Other abnormalities of gait and mobility  Right knee pain, unspecified chronicity  Medical/Referring Diagnosis:    History of total knee arthroplasty, right [Z96.651]    Referring Physician:  Otoniel Law Jr., MD MD Orders:  PT Eval and Treat   Return MD Appt:    Date of Onset:  Onset Date: 25     Allergies:   Latex, Acyclovir, Environmental/seasonal, Fire ant, and Adhesive tape  Restrictions/Precautions:   Fall risk; OP  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 She has full extension. Flexion was 95 degrees today and likely limited due to swelling. Pt has the added complications of lipedema and CVI.     Functional limitations reported at initial Eval: pain/difficulty with walking, sit to stand, household chores, and using stairs. She wants to be able to return to water aerobics and walk for exercise.     Subjective Comments: Pt says she has a lumbar MRI later today. She is compliant with her HEP.     Progress Note Counter: 4     Initial Pain Level::      4/10   Post

## 2025-05-16 NOTE — PROGRESS NOTES
Patient is demonstrating independence donning/doffing compression garments. Continue with plan of care.    Communication/Consultation:  None today  Equipment provided today:  None  Recommendations/Intent for next treatment session: Next visit will focus on education re: manual lymph drainage and fitting for and assessing compression garments.    >Total Treatment Billable Duration: 45 minutes  OT Individual Minutes  Time In: 1125  Time Out: 1213  Minutes: 48    SAM HENSLEY OT     Charge Capture   Events  [employer] Portal  Appt Desk  Attendance Report     Future Appointments   Date Time Provider Department Center   5/16/2025  3:55 PM POA INT MRI INTMXR AMB RAD [facility]   5/20/2025  3:00 PM Meme Prieto, PT SFDORPT SFD   5/22/2025  1:45 PM Sam Hensley, OT SFEORPT SFE   6/6/2025  9:00 AM PVF LAB PVF [facility] DEP   6/6/2025  1:50 PM Mario Estevez PA POAI [facility] AMB   7/30/2025  1:30 PM Lu Sandoval APRN - CNP PVF [facility] DEP   8/5/2025  3:15 PM Otoniel Law Jr., MD POAI [facility] AMB

## 2025-05-20 ENCOUNTER — APPOINTMENT (OUTPATIENT)
Dept: PHYSICAL THERAPY | Age: 75
End: 2025-05-20
Attending: ORTHOPAEDIC SURGERY
Payer: MEDICARE

## 2025-05-22 ENCOUNTER — HOSPITAL ENCOUNTER (OUTPATIENT)
Dept: PHYSICAL THERAPY | Age: 75
Setting detail: RECURRING SERIES
Discharge: HOME | End: 2025-05-25
Payer: MEDICARE

## 2025-05-22 PROCEDURE — 97535 SELF CARE MNGMENT TRAINING: CPT

## 2025-05-22 PROCEDURE — 97140 MANUAL THERAPY 1/> REGIONS: CPT

## 2025-05-22 ASSESSMENT — PAIN SCALES - GENERAL: PAINLEVEL_OUTOF10: 0

## 2025-05-22 NOTE — PROGRESS NOTES
Vanessa Jolly  : 1950  Primary: Medicare Part A And B (Medicare)  Secondary: BCBS [facility] @ [address]  [place] [address]  Phone: [phone]  Fax: [phone]    Plan of Care/Certification Expiration Date:  Certification Period Expiration Date: 25      Frequency/Duration: Plan Frequency: 1x/week for 90 days      Time In/Out:   Time In: 1350  Time Out: 1430    OT Visit Info:  Plan Frequency: 1x/week for 90 days  Progress Note Due Date: 25  Total # of Visits to Date: 8  Progress Note Counter: 8       Visit Count: 8   OUTPATIENT OCCUPATIONAL THERAPY: Treatment Note 2025  Episode: (Lymphedema)         Treatment Diagnosis:    Lymphedema, not elsewhere classified  Lipedema  Medical/Referring Diagnosis:   Lymphedema of both lower extremities   Referring Physician:  Lu Sandoval APRN - CNP MD Orders:  OT Eval and Treat   Return MD Appt:  Seeparadise Law tomorrow (3/27/25)   Date of Onset:  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   >Initial Pain Level:     0/10  >Post Session Pain Level:     0/10  Medications Last Reviewed:  2025  Updated Objective Findings:   See below:  Lymphedema:  Circumference Measurements (Lower Extremity):   Date:   Date:   Date:   Date: Date:   CM from base of heel Right Left Right Left Right Left Right Left Right Left   Foot  18 23 22.7

## 2025-06-05 ENCOUNTER — HOSPITAL ENCOUNTER (OUTPATIENT)
Dept: PHYSICAL THERAPY | Age: 75
Setting detail: RECURRING SERIES
Discharge: HOME | End: 2025-06-07
Attending: ORTHOPAEDIC SURGERY
Payer: MEDICARE

## 2025-06-05 PROCEDURE — 97530 THERAPEUTIC ACTIVITIES: CPT

## 2025-06-05 PROCEDURE — 97110 THERAPEUTIC EXERCISES: CPT

## 2025-06-05 PROCEDURE — 97140 MANUAL THERAPY 1/> REGIONS: CPT

## 2025-06-05 NOTE — PROGRESS NOTES
Vanessa Jolly  : 1950  Primary: Medicare Part A And B (Medicare)  Secondary: BCBS [facility] [place] [address]  [facility] [address]  Phone: [phone]  Fax: [phone] Plan Frequency: 2x/wk x 90days    Plan of Care/Certification Expiration Date: 25        Plan of Care/Certification Expiration Date:  Plan of Care/Certification Expiration Date: 25    Frequency/Duration: Plan Frequency: 2x/wk x 90days      Time In/Out:          PT Visit Info:    Plan Frequency: 2x/wk x 90days  Total # of Visits to Date: 12  Progress Note Counter: 5      Visit Count:  15    OUTPATIENT PHYSICAL THERAPY:   Treatment Note 2025       Episode  (Right TKA)               Treatment Diagnosis:    Other abnormalities of gait and mobility  Right knee pain, unspecified chronicity  Medical/Referring Diagnosis:    History of total knee arthroplasty, right [Z96.651]    Referring Physician:  Otoniel Law Jr., MD MD Orders:  PT Eval and Treat   Return MD Appt:    Date of Onset:  Onset Date: 25     Allergies:   Latex, Acyclovir, Environmental/seasonal, Fire ant, and Adhesive tape  Restrictions/Precautions:   Fall risk; OP  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 She has full extension. Flexion was 95 degrees today and likely limited due to swelling. Pt has the added complications of lipedema and CVI.     Functional limitations reported at initial Eval: pain/difficulty with walking, sit to stand, household chores, and using stairs. She wants to be able to return to water aerobics and walk for exercise.     Subjective Comments: Pt states she feels she has improved about 90% since beginning physical therapy. Most functional activities have gotten easier. Pt feels they will be able to continue

## 2025-06-05 NOTE — THERAPY DISCHARGE
Time Frame: 8 weeks  PT will be independent with final HEP. (4/18/25: continue) (6/5/2025: met)  Pt will decrease worst pain to 1/10 with all functional activities.(4/18/25: progressing) (6/5/2025: progressed)  Pt will increase right knee ROM to (0 to 125deg) to improve functional use of RLE.(4/18/25: progressing) (6/5/2025: progressed)  LEFS score will increase to 65/80 or more to demonstrate improvement in overall self reported function. (6/5/2025: progressed)  Pt will be able to walk without limitation or significant deviation.(4/18/25: progressing)(6/5/2025: progressed)  Pt will be able to shower, dress, and perform household chores without limitation.(4/18/25: progressing)(6/5/2025: progressed)         Tool Used: Knee injury and Osteoarthritis Outcome Score for Joint Replacement (KOOS, JR)  Score:  Initial: 9 (Interval: 63.776) 3/14/2025 Most Recent: 2 (Interval: 84.6) Date: 6/5/25   Interpretation of Score:  The KOOS, JR contains 7 items from the original KOOS survey. Items are coded from 0 to 4, none to extreme respectively.   KOOS, JR is scored by summing the raw response (range 0-28) and then converting it to an interval score using the table provided below. The interval score ranges from 0 to 100 where 0 represents total knee disability and 100 represents perfect knee health.      Tool Used: Lower Extremity Functional Scale (LEFS)  Score:  Initial: 29/80 6/5/2025: 59/80    Interpretation of Score: 20 questions each scored on a 5 point scale with 0 representing \"extreme difficulty or unable to perform\" and 4 representing \"no difficulty\".  The lower the score, the greater the functional disability. 80/80 represents no disability.  Minimal detectable change is 9 points.    Thank you for this referral,  Meme Prieto, PT, DPT, COMT     Referring Physician Signature: Otoniel Law Jr., * No Signature is Required for this note.        Charge Capture  Appt Desk

## 2025-06-06 ENCOUNTER — OFFICE VISIT (OUTPATIENT)
Dept: ORTHOPEDIC SURGERY | Age: 75
End: 2025-06-06

## 2025-06-06 ENCOUNTER — LAB (OUTPATIENT)
Dept: FAMILY MEDICINE CLINIC | Facility: CLINIC | Age: 75
End: 2025-06-06

## 2025-06-06 VITALS — BODY MASS INDEX: 39.37 KG/M2 | HEIGHT: 66 IN | WEIGHT: 245 LBS

## 2025-06-06 DIAGNOSIS — M47.816 LUMBAR SPONDYLOSIS: ICD-10-CM

## 2025-06-06 DIAGNOSIS — E03.9 ACQUIRED HYPOTHYROIDISM: ICD-10-CM

## 2025-06-06 DIAGNOSIS — M48.062 SPINAL STENOSIS OF LUMBAR REGION WITH NEUROGENIC CLAUDICATION: Primary | ICD-10-CM

## 2025-06-06 LAB — TSH W FREE THYROID IF ABNORMAL: 0.88 UIU/ML (ref 0.27–4.2)

## 2025-06-06 NOTE — PROGRESS NOTES
Name: Vanessa Jolly  YOB: 1950  Gender: female  MRN: [id number]    CC:   Chief Complaint   Patient presents with    New Patient     Lumbar spine pain          HPI:       History of Present Illness  The patient is a 74-year-old female who presents for evaluation of lower back pain and related complaints. She was referred by Dr. Law following a knee replacement surgery in 02/2025, which was complicated by severe hip pain. An x-ray revealed no abnormalities in the hip, but rather in the back. The pain, described as originating from the buttock area, radiates down to her foot. She reports no numbness extending to her foot, although she does experience some numbness post-knee surgery.    The onset of this pain was approximately a few months ago. She has a history of back issues and has previously received treatment at [facility], including radiation therapy for her knee and a back injection a few years ago. She was discharged from knee therapy yesterday and is currently using a cane for mobility. She also reports an IT band problem post-surgery.    PAST SURGICAL HISTORY:  Knee replacement surgery in 02/2025  Back injection a few years ago    SOCIAL HISTORY  Marital Status:             Past Medical History Includes:   Past Medical History:   Diagnosis Date    Age related osteoporosis     Allergic rhinitis     Arrhythmia 2005    SVT in past    Arthritis     osteo    ASD (atrial septal defect)     Breast cyst 2/6/23    Chronic venous insufficiency     Dry eyes, bilateral     GERD (gastroesophageal reflux disease)     History of echocardiogram 11/15/2023    Normal left ventricular systolic function. EF-53%. Left ventricle size is normal. Increased wall thickness. Normal wall motion. Normal diastolic function. Mitral Valve: Mild regurgitation. Tricuspid Valve: Mildly elevated RVSP. The estimated RVSP is 38 mmHg. Aorta: Normal sized aortic root. Ao root diameter is 2.9 cm. IVC/SVC:

## 2025-06-09 ENCOUNTER — RESULTS FOLLOW-UP (OUTPATIENT)
Dept: FAMILY MEDICINE CLINIC | Facility: CLINIC | Age: 75
End: 2025-06-09

## 2025-06-22 NOTE — PROGRESS NOTES
[facility] CARDIOLOGY Follow Up                 Reason for Visit: Hypertension    Subjective:     Patient is a 74 y.o. female with a PMH of ill-defined condition (ASD), ill-defined condition (SVT), hypertension, hyperlipidemia, and chronic venous insufficiency who presents for follow-up.  The patient was last seen in May 2024.  She had a TTE in November 2023 and was noted to demonstrate a low normal EF and mild MR. Right-sided chambers were noted to be normal size.  Her ascending aorta was measured at 3.9 cm.  The patient denies angina and dyspnea.    Past Medical History:   Diagnosis Date    Age related osteoporosis     Allergic rhinitis     Arrhythmia 2005    SVT in past    Arthritis     osteo    ASD (atrial septal defect)     Breast cyst 2/6/23    Chronic venous insufficiency     Dry eyes, bilateral     GERD (gastroesophageal reflux disease)     History of echocardiogram 11/15/2023    Normal left ventricular systolic function. EF-53%. Left ventricle size is normal. Increased wall thickness. Normal wall motion. Normal diastolic function. Mitral Valve: Mild regurgitation. Tricuspid Valve: Mildly elevated RVSP. The estimated RVSP is 38 mmHg. Aorta: Normal sized aortic root. Ao root diameter is 2.9 cm. IVC/SVC: IVC diameter is greater  than 50% during inspiration    Hyperlipidemia     Hypertension     Hypothyroidism     Lipedema     Lipedema     right lower leg    Major depressive disorder, recurrent, mild     Obesity (BMI 30-39.9) 11/14/2011    BMI-39.93    Osteoarthritis     Situational anxiety     Stress incontinence of urine     Thyroid disease 20 years    on medication      Past Surgical History:   Procedure Laterality Date    CATARACT EXTRACTION Bilateral     COLONOSCOPY  05/20/2024    EYE SURGERY      Cataracts    HYSTERECTOMY, TOTAL ABDOMINAL (CERVIX REMOVED)  2007    HYSTERECTOMY, VAGINAL      JOINT REPLACEMENT  12/12/2022    KNEE ARTHROSCOPY Bilateral 2011    LIPOSUCTION  12/30/2024    NASAL SEPTUM SURGERY

## 2025-06-23 ENCOUNTER — OFFICE VISIT (OUTPATIENT)
Dept: ORTHOPEDIC SURGERY | Age: 75
End: 2025-06-23
Payer: MEDICARE

## 2025-06-23 ENCOUNTER — OFFICE VISIT (OUTPATIENT)
Age: 75
End: 2025-06-23
Payer: MEDICARE

## 2025-06-23 VITALS
DIASTOLIC BLOOD PRESSURE: 80 MMHG | HEART RATE: 74 BPM | BODY MASS INDEX: 39.21 KG/M2 | HEIGHT: 66 IN | SYSTOLIC BLOOD PRESSURE: 132 MMHG | WEIGHT: 244 LBS

## 2025-06-23 DIAGNOSIS — M48.062 SPINAL STENOSIS OF LUMBAR REGION WITH NEUROGENIC CLAUDICATION: ICD-10-CM

## 2025-06-23 DIAGNOSIS — I71.20 THORACIC AORTIC ANEURYSM WITHOUT RUPTURE, UNSPECIFIED PART: ICD-10-CM

## 2025-06-23 DIAGNOSIS — E78.5 HYPERLIPIDEMIA, UNSPECIFIED HYPERLIPIDEMIA TYPE: ICD-10-CM

## 2025-06-23 DIAGNOSIS — I34.0 MILD MITRAL REGURGITATION BY PRIOR ECHOCARDIOGRAM: ICD-10-CM

## 2025-06-23 DIAGNOSIS — R69 ILL-DEFINED CONDITION: Primary | ICD-10-CM

## 2025-06-23 DIAGNOSIS — I87.2 CHRONIC VENOUS INSUFFICIENCY: ICD-10-CM

## 2025-06-23 DIAGNOSIS — M47.816 LUMBAR SPONDYLOSIS: Primary | ICD-10-CM

## 2025-06-23 PROCEDURE — 3017F COLORECTAL CA SCREEN DOC REV: CPT | Performed by: INTERNAL MEDICINE

## 2025-06-23 PROCEDURE — 1159F MED LIST DOCD IN RCRD: CPT | Performed by: INTERNAL MEDICINE

## 2025-06-23 PROCEDURE — 1125F AMNT PAIN NOTED PAIN PRSNT: CPT | Performed by: INTERNAL MEDICINE

## 2025-06-23 PROCEDURE — 99214 OFFICE O/P EST MOD 30 MIN: CPT | Performed by: INTERNAL MEDICINE

## 2025-06-23 PROCEDURE — 1090F PRES/ABSN URINE INCON ASSESS: CPT | Performed by: INTERNAL MEDICINE

## 2025-06-23 PROCEDURE — 1123F ACP DISCUSS/DSCN MKR DOCD: CPT | Performed by: INTERNAL MEDICINE

## 2025-06-23 PROCEDURE — G8417 CALC BMI ABV UP PARAM F/U: HCPCS | Performed by: INTERNAL MEDICINE

## 2025-06-23 PROCEDURE — 1160F RVW MEDS BY RX/DR IN RCRD: CPT | Performed by: INTERNAL MEDICINE

## 2025-06-23 PROCEDURE — G8427 DOCREV CUR MEDS BY ELIG CLIN: HCPCS | Performed by: INTERNAL MEDICINE

## 2025-06-23 PROCEDURE — 3079F DIAST BP 80-89 MM HG: CPT | Performed by: INTERNAL MEDICINE

## 2025-06-23 PROCEDURE — 1036F TOBACCO NON-USER: CPT | Performed by: INTERNAL MEDICINE

## 2025-06-23 PROCEDURE — G8399 PT W/DXA RESULTS DOCUMENT: HCPCS | Performed by: INTERNAL MEDICINE

## 2025-06-23 PROCEDURE — 64484 NJX AA&/STRD TFRM EPI L/S EA: CPT | Performed by: PHYSICAL MEDICINE & REHABILITATION

## 2025-06-23 PROCEDURE — 64483 NJX AA&/STRD TFRM EPI L/S 1: CPT | Performed by: PHYSICAL MEDICINE & REHABILITATION

## 2025-06-23 PROCEDURE — 3075F SYST BP GE 130 - 139MM HG: CPT | Performed by: INTERNAL MEDICINE

## 2025-06-23 RX ORDER — TRIAMCINOLONE ACETONIDE 40 MG/ML
40 INJECTION, SUSPENSION INTRA-ARTICULAR; INTRAMUSCULAR ONCE
Status: COMPLETED | OUTPATIENT
Start: 2025-06-23 | End: 2025-06-23

## 2025-06-23 RX ADMIN — TRIAMCINOLONE ACETONIDE 40 MG: 40 INJECTION, SUSPENSION INTRA-ARTICULAR; INTRAMUSCULAR at 08:41

## 2025-06-23 NOTE — PROGRESS NOTES
Name: Vanessa Jolly  YOB: 1950  Gender: female  MRN: [id number]        Transforaminal MILDRED Procedure Note    Procedure: Right  L4-L5 and L5-S1 transforaminal epidural steroid injections     Precautions: Vanessa Jolly denies prior sensitivity to steroid, local anesthetic, iodine, or shellfish.       Consent:  Consent was obtained prior to the procedure. The procedure was discussed at length with Vanessa Jolly. She was given the opportunity to ask questions regarding the procedure and its associated risks.  In addition to the potential risks associated with the procedure itself, the patient was informed both verbally and in writing of potential side effects of the use glucocorticoids.  The patient appeared to comprehend the informed consent and desired to have the procedure performed, and informed consent was signed.     She was placed in a prone position on the fluoroscopy table and the skin was prepped and draped in a routine sterile fashion. The areas to be injected were each anesthetized with 1 ml of 1% Lidocaine. A 22 gauge 5 inch spinal needle was carefully advanced under fluoroscopic guidance to the right L5-S1 transforaminal space  0.5 ml of 70% of Omnipaque was injected to confirm proper needle placement and absence of subdural or vascular flow Once proper placement was confirmed, 0.5ml of sterile water and 40 mg kenalog were injected through the spinal needle.       The above procedure was then repeated at the Right  L4-L5 transforaminal space.    Fluoroscopic guidance was used intermittently over a 10-minute period to insure proper needle placement and her safety. A hard copy of the fluoroscopic image has been placed in her chart and is saved on the C-arm hard drive. She was monitored after the procedure and discharged home in a stable fashion with a routine follow up.    Procedural Diagnosis:     ICD-10-CM    1. Lumbar spondylosis  M47.816 FL NERVE BLOCK LUMBOSACRAL

## 2025-07-01 ENCOUNTER — RESULTS FOLLOW-UP (OUTPATIENT)
Age: 75
End: 2025-07-01

## 2025-07-01 ENCOUNTER — TELEPHONE (OUTPATIENT)
Age: 75
End: 2025-07-01

## 2025-07-01 ENCOUNTER — HOSPITAL ENCOUNTER (OUTPATIENT)
Dept: CT IMAGING | Age: 75
Discharge: HOME | End: 2025-07-03
Attending: INTERNAL MEDICINE
Payer: MEDICARE

## 2025-07-01 DIAGNOSIS — I71.20 THORACIC AORTIC ANEURYSM WITHOUT RUPTURE, UNSPECIFIED PART: ICD-10-CM

## 2025-07-01 LAB — CREAT BLD-MCNC: 0.63 MG/DL (ref 0.8–1.5)

## 2025-07-01 PROCEDURE — 71275 CT ANGIOGRAPHY CHEST: CPT

## 2025-07-01 PROCEDURE — 6360000004 HC RX CONTRAST MEDICATION: Performed by: INTERNAL MEDICINE

## 2025-07-01 PROCEDURE — 82565 ASSAY OF CREATININE: CPT

## 2025-07-01 RX ORDER — IOPAMIDOL 755 MG/ML
75 INJECTION, SOLUTION INTRAVASCULAR
Status: COMPLETED | OUTPATIENT
Start: 2025-07-01 | End: 2025-07-01

## 2025-07-01 RX ADMIN — IOPAMIDOL 75 ML: 755 INJECTION, SOLUTION INTRAVENOUS at 15:35

## 2025-07-01 NOTE — TELEPHONE ENCOUNTER
Roland Rodrigez MD to [facility] Cardiology Triage (Selected Message)      7/1/25  4:28 PM  Result Note  Please let the patient know there is no evidence of TAA.    CTA CHEST W WO CONTRAST

## 2025-08-04 ENCOUNTER — TELEPHONE (OUTPATIENT)
Dept: FAMILY MEDICINE CLINIC | Facility: CLINIC | Age: 75
End: 2025-08-04

## 2025-08-12 ENCOUNTER — OFFICE VISIT (OUTPATIENT)
Dept: ORTHOPEDIC SURGERY | Age: 75
End: 2025-08-12

## 2025-08-12 DIAGNOSIS — Z96.651 STATUS POST RIGHT KNEE REPLACEMENT: Primary | ICD-10-CM

## 2025-09-04 DIAGNOSIS — E03.9 ACQUIRED HYPOTHYROIDISM: ICD-10-CM

## 2025-09-04 RX ORDER — LEVOTHYROXINE SODIUM 88 UG/1
88 TABLET ORAL
Qty: 100 TABLET | Refills: 3 | Status: SHIPPED | OUTPATIENT
Start: 2025-09-04

## (undated) DEVICE — BIPOLAR SEALER 23-112-1 AQM 6.0: Brand: AQUAMANTYS ®

## (undated) DEVICE — GLOVE SURG SZ 8 L12IN FNGR THK79MIL GRN LTX FREE

## (undated) DEVICE — SOLUTION WND IRRIGATION 450 ML 0.5 PVP-I 0.9 NACL

## (undated) DEVICE — CURETTE SURG FOR BNE CEM REM QUIK USE

## (undated) DEVICE — SUTURE ETHBND EXCEL SZ 2 L30IN NONABSORBABLE GRN L75MM LR X496T

## (undated) DEVICE — GLOVE SURG SZ 85 L12IN FNGR THK79MIL GRN LTX FREE

## (undated) DEVICE — DRAPE,TOP,102X53,STERILE: Brand: MEDLINE

## (undated) DEVICE — STERILE PRESSURE PROTECTOR PAD® FOR DE MAYO UNIVERSAL DISTRACTOR® (10/CASE): Brand: DE MAYO UNIVERSAL DISTRACTOR®

## (undated) DEVICE — GLOVE SURG SZ 7 CRM LTX FREE POLYISOPRENE POLYMER BEAD ANTI

## (undated) DEVICE — BLADE RMR L46MM PAT PILOT H

## (undated) DEVICE — SYRINGE MED 50ML LUERLOCK TIP

## (undated) DEVICE — POWDER SURG CELLERATE RX 1 GM HYDROL COLLEGEN

## (undated) DEVICE — HOOD: Brand: T7PLUS

## (undated) DEVICE — KIT INT FIX FEM TIB CKPT MAKOPLASTY

## (undated) DEVICE — SOLUTION IV 250ML 0.9% SOD CHL PH 5 INJ USP VIAFLX PLAS

## (undated) DEVICE — SUTURE VICRYL SZ 1 L36IN ABSRB UD L36MM CT-1 1/2 CIR J947H

## (undated) DEVICE — TOTAL KNEE: Brand: MEDLINE INDUSTRIES, INC.

## (undated) DEVICE — SOLUTION IRRIG 1000ML 0.9% SOD CHL USP POUR PLAS BTL

## (undated) DEVICE — GLOVE ORANGE PI 8   MSG9080

## (undated) DEVICE — BLADE SAW W12.5XL70MM THK0.8MM CUT THK1.12MM S STL RECIP

## (undated) DEVICE — TOTAL KNEE DR JENNINGS: Brand: MEDLINE INDUSTRIES, INC.

## (undated) DEVICE — SUTURE ABSRB X-1 REV CUT 1/2 CIR 22MM UD BRAID 27IN SZ 3-0 J458H

## (undated) DEVICE — DRESSING HYDROFIBER AQUACEL AG ADVANTAGE 3.5X12 IN

## (undated) DEVICE — SUTURE ABS ANTIBACT 1-0 CTX 24IN STRATAFIX PDS+ SXPP1A445

## (undated) DEVICE — SOLUTION IRRIG 3000ML 0.9% SOD CHL USP UROMATIC PLAS CONT

## (undated) DEVICE — YANKAUER,FLEXIBLE HANDLE,REGLR CAPACITY: Brand: MEDLINE INDUSTRIES, INC.

## (undated) DEVICE — STERILE PVP: Brand: MEDLINE INDUSTRIES, INC.

## (undated) DEVICE — GLOVE SURG SZ 7 L11.33IN FNGR THK9.8MIL STRW LTX POLYMER

## (undated) DEVICE — PIN BNE FIX TEMP L140MM DIA4MM MAKO

## (undated) DEVICE — KIT TRK KNEE PROC VIZADISC

## (undated) DEVICE — GLOVE ORANGE PI 8 1/2   MSG9085

## (undated) DEVICE — GLOVE SURG SZ 65 THK91MIL LTX FREE SYN POLYISOPRENE

## (undated) DEVICE — PIN BNE FIX TEMP L110MM DIA4MM MAKO

## (undated) DEVICE — SUTURE MONOCRYL STRATAFIX SPRL + SZ 2-0 L18IN ABSRB UD CT-1 SXMP1B413

## (undated) DEVICE — GLOVE SURG SZ 75 L12IN FNGR THK79MIL GRN LTX FREE

## (undated) DEVICE — KIT DRP FOR RIO ROBOTIC ARM ASST SYS

## (undated) DEVICE — KIT FEM TIBIAL CHECKPOINT ST

## (undated) DEVICE — SOLUTION IRRIG 1000ML STRL H2O USP PLAS POUR BTL

## (undated) DEVICE — SYRINGE MED 30ML STD CLR PLAS LUERLOCK TIP N CTRL DISP

## (undated) DEVICE — SUTURE VCRL SZ 2-0 L18IN ABSRB UD CT-1 L36MM 1/2 CIR J839D

## (undated) DEVICE — 450 ML BOTTLE OF 0.05% CHLORHEXIDINE GLUCONATE IN 99.95% STERILE WATER FOR IRRIGATION, USP AND APPLICATOR.: Brand: IRRISEPT ANTIMICROBIAL WOUND LAVAGE

## (undated) DEVICE — PIN BNE FIX L140MM DIA4MM MAKO

## (undated) DEVICE — SUTURE ETHIBOND EXCEL SZ 5 L30IN NONABSORBABLE GRN L40MM V-37 MB66G

## (undated) DEVICE — SPONGE LAPAROTOMY W18XL18IN WHITE STRUNG RADIOPAQUE STERILE

## (undated) DEVICE — X-LARGE COTTON GLOVE: Brand: DEROYAL

## (undated) DEVICE — PIN BNE FIX L110MM DIA4MM

## (undated) DEVICE — KIT VIZADISC KNEE TRACKING

## (undated) DEVICE — KIT RIO DRAPE ONE PIECE W/ POCKETS